# Patient Record
Sex: MALE | Race: WHITE | NOT HISPANIC OR LATINO | Employment: OTHER | URBAN - METROPOLITAN AREA
[De-identification: names, ages, dates, MRNs, and addresses within clinical notes are randomized per-mention and may not be internally consistent; named-entity substitution may affect disease eponyms.]

---

## 2019-07-02 ENCOUNTER — HOSPITAL ENCOUNTER (EMERGENCY)
Facility: CLINIC | Age: 79
Discharge: HOME | End: 2019-07-02
Attending: EMERGENCY MEDICINE | Admitting: EMERGENCY MEDICINE
Payer: COMMERCIAL

## 2019-07-02 ENCOUNTER — APPOINTMENT (OUTPATIENT)
Dept: ULTRASOUND IMAGING | Facility: CLINIC | Age: 79
End: 2019-07-02
Attending: EMERGENCY MEDICINE
Payer: COMMERCIAL

## 2019-07-02 VITALS
DIASTOLIC BLOOD PRESSURE: 116 MMHG | OXYGEN SATURATION: 93 % | TEMPERATURE: 98.1 F | SYSTOLIC BLOOD PRESSURE: 156 MMHG | WEIGHT: 223.11 LBS | RESPIRATION RATE: 16 BRPM | HEART RATE: 91 BPM

## 2019-07-02 DIAGNOSIS — L03.115 CELLULITIS OF RIGHT LOWER EXTREMITY: ICD-10-CM

## 2019-07-02 LAB
ANION GAP SERPL CALCULATED.3IONS-SCNC: 5 MMOL/L (ref 3–14)
BASOPHILS # BLD AUTO: 0 10E9/L (ref 0–0.2)
BASOPHILS NFR BLD AUTO: 0 %
BUN SERPL-MCNC: 21 MG/DL (ref 7–30)
CALCIUM SERPL-MCNC: 9 MG/DL (ref 8.5–10.1)
CHLORIDE SERPL-SCNC: 109 MMOL/L (ref 94–109)
CO2 SERPL-SCNC: 27 MMOL/L (ref 20–32)
CREAT SERPL-MCNC: 1.02 MG/DL (ref 0.66–1.25)
DIFFERENTIAL METHOD BLD: ABNORMAL
EOSINOPHIL # BLD AUTO: 0.4 10E9/L (ref 0–0.7)
EOSINOPHIL NFR BLD AUTO: 3 %
ERYTHROCYTE [DISTWIDTH] IN BLOOD BY AUTOMATED COUNT: 13.7 % (ref 10–15)
GFR SERPL CREATININE-BSD FRML MDRD: 70 ML/MIN/{1.73_M2}
GLUCOSE SERPL-MCNC: 83 MG/DL (ref 70–99)
HCT VFR BLD AUTO: 49.3 % (ref 40–53)
HGB BLD-MCNC: 15.9 G/DL (ref 13.3–17.7)
LYMPHOCYTES # BLD AUTO: 8.4 10E9/L (ref 0.8–5.3)
LYMPHOCYTES NFR BLD AUTO: 57 %
MCH RBC QN AUTO: 32.5 PG (ref 26.5–33)
MCHC RBC AUTO-ENTMCNC: 32.3 G/DL (ref 31.5–36.5)
MCV RBC AUTO: 101 FL (ref 78–100)
MONOCYTES # BLD AUTO: 0.7 10E9/L (ref 0–1.3)
MONOCYTES NFR BLD AUTO: 5 %
NEUTROPHILS # BLD AUTO: 5.1 10E9/L (ref 1.6–8.3)
NEUTROPHILS NFR BLD AUTO: 35 %
PLATELET # BLD AUTO: 183 10E9/L (ref 150–450)
PLATELET # BLD EST: ABNORMAL 10*3/UL
POTASSIUM SERPL-SCNC: 4.1 MMOL/L (ref 3.4–5.3)
RBC # BLD AUTO: 4.89 10E12/L (ref 4.4–5.9)
RBC MORPH BLD: ABNORMAL
SODIUM SERPL-SCNC: 141 MMOL/L (ref 133–144)
VARIANT LYMPHS BLD QL SMEAR: PRESENT
WBC # BLD AUTO: 14.7 10E9/L (ref 4–11)

## 2019-07-02 PROCEDURE — 80048 BASIC METABOLIC PNL TOTAL CA: CPT | Performed by: EMERGENCY MEDICINE

## 2019-07-02 PROCEDURE — 99284 EMERGENCY DEPT VISIT MOD MDM: CPT | Mod: 25

## 2019-07-02 PROCEDURE — 85025 COMPLETE CBC W/AUTO DIFF WBC: CPT | Performed by: EMERGENCY MEDICINE

## 2019-07-02 PROCEDURE — 25000132 ZZH RX MED GY IP 250 OP 250 PS 637: Performed by: EMERGENCY MEDICINE

## 2019-07-02 PROCEDURE — 93971 EXTREMITY STUDY: CPT | Mod: RT

## 2019-07-02 RX ORDER — CEPHALEXIN 500 MG/1
500 CAPSULE ORAL ONCE
Status: COMPLETED | OUTPATIENT
Start: 2019-07-02 | End: 2019-07-02

## 2019-07-02 RX ORDER — CEPHALEXIN 500 MG/1
500 CAPSULE ORAL 4 TIMES DAILY
Qty: 28 CAPSULE | Refills: 0 | Status: SHIPPED | OUTPATIENT
Start: 2019-07-02 | End: 2019-07-12

## 2019-07-02 RX ADMIN — CEPHALEXIN 500 MG: 500 CAPSULE ORAL at 13:36

## 2019-07-02 ASSESSMENT — ENCOUNTER SYMPTOMS
CHILLS: 0
WOUND: 1
FEVER: 0
COLOR CHANGE: 1
SHORTNESS OF BREATH: 0

## 2019-07-02 NOTE — ED PROVIDER NOTES
"  History     Chief Complaint:  Leg Swelling      HPI   Jaswant Sawyer is a 79 year old male with a history of hypertension, enlarged prostate, and tobacco use, who presents with right lower leg swelling for the past 2 weeks, with noticed redness of the area today, prompting him to visit the ED. The patient reports visiting Dr. Means, Dermatology, on 5/31/19, for a tender nodule on his right lower leg, which was found to be a 3 cm erythematous abscess with central purulent drainage. He then describes that about 2 weeks ago, he noticed swelling in his right lower leg that has continued to migrate up his leg and noticed redness and weeping around the area today. He endorses occasional difficulty walking on his right leg. He reports recent flying and boat travel to the [facility] region. He notes previous history of \"itchy\" spots on his skin in the past. He denies fever, chills, pain in the leg, previous leg swelling, chest pain, shortness of breath, or recently working outside with brush.     Allergies:  NDKA     Medications:    Bactrim     Past Medical History:    Enlarged prostate  Smokers cough  HTN  Bladder caliculi     Past Surgical History:    Appendectomy   Cataract removal     Family History:    No family history on file.    Social History:  Smoking status: yes  Alcohol use: yes  Drug use: not on file   PCP: Martínez Mathur     Review of Systems   Constitutional: Negative for chills and fever.   Respiratory: Negative for shortness of breath.    Cardiovascular: Positive for leg swelling. Negative for chest pain.   Skin: Positive for color change and wound.   All other systems reviewed and are negative.      Physical Exam     Patient Vitals for the past 24 hrs:   BP Temp Temp src Pulse Heart Rate Resp SpO2 Weight   07/02/19 1136 (!) 156/116 98.1  F (36.7  C) Temporal 91 91 16 93 % 101.2 kg (223 lb 1.7 oz)       Physical Exam  General: Patient is alert and interactive when I enter the room  Head:  The scalp, face, " and head appear normal  Eyes:  The pupils are equal, round, and reactive to light    Conjunctivae and sclerae are normal  ENT:    External acoustic canals are normal    The oropharynx is normal without erythema.     Uvula is in the midline  Neck:  Normal range of motion  CV:  Regular rate. S1/S2. No murmurs. Normal pulses.   Resp:  Lungs are clear without wheezes or rales. No distress  GI:  Abdomen is soft, no rigidity, guarding, or rebound    No distension. No tenderness to palpation in any quadrant.     MS:  Normal tone. Joints grossly normal without effusions.     No calf or thigh tenderness.  Right leg swelling and redness. Redness extends to the toes up through the mid tibia.     Normal motor assessment of all extremities.  Skin:  No rash or lesions noted. Normal capillary refill on right lower extremity. No abscess identified. Right leg warm to touch.   Neuro: Speech is normal and fluent. Face is symmetric.     Moving all extremities well.   Psych:  Awake. Alert.  Normal affect.  Appropriate interactions.  Lymph: No anterior cervical lymphadenopathy noted    Emergency Department Course     Imaging:  Radiographic findings were communicated with the patient who voiced understanding of the findings.    US Lower Extremity Venous Duplex Right  No evidence of lower extremity deep venous thrombosis.  As read by Radiology.    Laboratory:  BMP: WNL (Creatinine: 1.02)  CBC: WBC 14.7 (H) o/w WNL (HGB 15.9, )    Interventions:  1336: Keflex 500 mg PO    Emergency Department Course:  1217: Nursing notes and vitals reviewed. I performed an exam of the patient as documented above.     IV inserted. Medicine administered as documented above. Blood drawn. This was sent to the lab for further testing, results above.    The patient was sent for a lower extremity ultrasound while in the emergency department, findings above.     1330: I rechecked the patient and discussed the results of his workup thus far.     Findings and  plan explained to the Patient. Patient discharged home with instructions regarding supportive care, medications, and reasons to return. The importance of close follow-up was reviewed. The patient was prescribed Keflex.     I personally reviewed the laboratory results with the Patient and answered all related questions prior to discharge.     Impression & Plan      Medical Decision Making:  Jaswant Sawyer is a 79 year old male who presents for evaluation of skin redness.  The history, physical exam and supporting data are consistent with cellulitis.  There do not appear at this time to be any complication of cellulitis including necrotizing fascitis, lymphangitis, lymphadenitis, abscess, osteomyelitis, sepsis, or shock.  The patient is not immunosuppressed.  Supportive outpatient management is indicated with antibiotics.  Close follow-up of primary care physician to ensure no progression and rapid resolution.  Area of cellulitis was outlined.  Cellulitis precautions for home.    Would start with keflex as no abscess, pustule nor hx of MRSA. Recheck by doctor in 1-2 days.      Critical Care time:  none    Diagnosis:    ICD-10-CM    1. Cellulitis of right lower extremity L03.115 CBC with platelets differential       Disposition:  discharged to home    Discharge Medications:     Medication List      Started    cephALEXin 500 MG capsule  Commonly known as:  KEFLEX  500 mg, Oral, 4 TIMES DAILY          IBianka, am serving as a scribe at 12:17 PM on 7/2/2019 to document services personally performed by Kyrie Ness MD based on my observations and the provider's statements to me.       Bianka Riojas  7/2/2019   [facility] EMERGENCY DEPARTMENT       Kyrie Ness MD  07/02/19 7401

## 2019-07-02 NOTE — ED TRIAGE NOTES
"Patient reports right lower leg swelling, redness and itching for about 2 weeks, getting worse and \"creeping up the leg\". Denies fevers and chills.   "

## 2019-07-02 NOTE — ED AVS SNAPSHOT
[facility]  [address]  [facility] [address]  Phone:  [phone]  Fax:  [phone]                                    Jaswant KEAGAN Sawyer   MRN: [id number]    Department:  [facility]   Date of Visit:  7/2/2019           After Visit Summary Signature Page    I have received my discharge instructions, and my questions have been answered. I have discussed any challenges I see with this plan with the nurse or doctor.    ..........................................................................................................................................  Patient/Patient Representative Signature      ..........................................................................................................................................  Patient Representative Print Name and Relationship to Patient    ..................................................               ................................................  Date                                   Time    ..........................................................................................................................................  Reviewed by Signature/Title    ...................................................              ..............................................  Date                                               Time          22EPIC Rev 08/18

## 2019-07-12 ENCOUNTER — HOSPITAL ENCOUNTER (OUTPATIENT)
Facility: CLINIC | Age: 79
Setting detail: OBSERVATION
Discharge: HOME | End: 2019-07-14
Attending: EMERGENCY MEDICINE | Admitting: INTERNAL MEDICINE
Payer: COMMERCIAL

## 2019-07-12 DIAGNOSIS — I10 ESSENTIAL HYPERTENSION: Primary | ICD-10-CM

## 2019-07-12 DIAGNOSIS — J44.9 CHRONIC OBSTRUCTIVE PULMONARY DISEASE, UNSPECIFIED COPD TYPE (H): ICD-10-CM

## 2019-07-12 DIAGNOSIS — L03.90 CELLULITIS, UNSPECIFIED CELLULITIS SITE: ICD-10-CM

## 2019-07-12 LAB
ANION GAP SERPL CALCULATED.3IONS-SCNC: 7 MMOL/L (ref 3–14)
BUN SERPL-MCNC: 19 MG/DL (ref 7–30)
CALCIUM SERPL-MCNC: 8.7 MG/DL (ref 8.5–10.1)
CHLORIDE SERPL-SCNC: 107 MMOL/L (ref 94–109)
CO2 SERPL-SCNC: 26 MMOL/L (ref 20–32)
CREAT SERPL-MCNC: 0.95 MG/DL (ref 0.66–1.25)
CRP SERPL-MCNC: <2.9 MG/L (ref 0–8)
ERYTHROCYTE [SEDIMENTATION RATE] IN BLOOD BY WESTERGREN METHOD: 5 MM/H (ref 0–20)
GFR SERPL CREATININE-BSD FRML MDRD: 77 ML/MIN/{1.73_M2}
GLUCOSE SERPL-MCNC: 140 MG/DL (ref 70–99)
POTASSIUM SERPL-SCNC: 4 MMOL/L (ref 3.4–5.3)
PROCALCITONIN SERPL-MCNC: <0.05 NG/ML
SODIUM SERPL-SCNC: 140 MMOL/L (ref 133–144)

## 2019-07-12 PROCEDURE — 85025 COMPLETE CBC W/AUTO DIFF WBC: CPT | Performed by: EMERGENCY MEDICINE

## 2019-07-12 PROCEDURE — 84145 PROCALCITONIN (PCT): CPT | Performed by: EMERGENCY MEDICINE

## 2019-07-12 PROCEDURE — 80048 BASIC METABOLIC PNL TOTAL CA: CPT | Performed by: EMERGENCY MEDICINE

## 2019-07-12 PROCEDURE — 85652 RBC SED RATE AUTOMATED: CPT | Performed by: EMERGENCY MEDICINE

## 2019-07-12 PROCEDURE — G0378 HOSPITAL OBSERVATION PER HR: HCPCS

## 2019-07-12 PROCEDURE — 87040 BLOOD CULTURE FOR BACTERIA: CPT | Performed by: EMERGENCY MEDICINE

## 2019-07-12 PROCEDURE — 99285 EMERGENCY DEPT VISIT HI MDM: CPT | Mod: 25

## 2019-07-12 PROCEDURE — 86140 C-REACTIVE PROTEIN: CPT | Performed by: EMERGENCY MEDICINE

## 2019-07-12 PROCEDURE — 96365 THER/PROPH/DIAG IV INF INIT: CPT

## 2019-07-12 PROCEDURE — 99220 ZZC INITIAL OBSERVATION CARE,LEVL III: CPT | Performed by: INTERNAL MEDICINE

## 2019-07-12 PROCEDURE — 36415 COLL VENOUS BLD VENIPUNCTURE: CPT | Performed by: EMERGENCY MEDICINE

## 2019-07-12 PROCEDURE — 25000125 ZZHC RX 250: Performed by: EMERGENCY MEDICINE

## 2019-07-12 PROCEDURE — 25000132 ZZH RX MED GY IP 250 OP 250 PS 637: Performed by: INTERNAL MEDICINE

## 2019-07-12 RX ORDER — LANOLIN ALCOHOL/MO/W.PET/CERES
3 CREAM (GRAM) TOPICAL
Status: DISCONTINUED | OUTPATIENT
Start: 2019-07-12 | End: 2019-07-14 | Stop reason: HOSPADM

## 2019-07-12 RX ORDER — ACETAMINOPHEN 650 MG/1
650 SUPPOSITORY RECTAL EVERY 4 HOURS PRN
Status: DISCONTINUED | OUTPATIENT
Start: 2019-07-12 | End: 2019-07-14 | Stop reason: HOSPADM

## 2019-07-12 RX ORDER — DOXYCYCLINE 100 MG/1
100 CAPSULE ORAL 2 TIMES DAILY
Status: ON HOLD | COMMUNITY
Start: 2019-07-09 | End: 2019-07-14

## 2019-07-12 RX ORDER — NALOXONE HYDROCHLORIDE 0.4 MG/ML
.1-.4 INJECTION, SOLUTION INTRAMUSCULAR; INTRAVENOUS; SUBCUTANEOUS
Status: DISCONTINUED | OUTPATIENT
Start: 2019-07-12 | End: 2019-07-14 | Stop reason: HOSPADM

## 2019-07-12 RX ORDER — CLINDAMYCIN PHOSPHATE 900 MG/50ML
900 INJECTION, SOLUTION INTRAVENOUS ONCE
Status: COMPLETED | OUTPATIENT
Start: 2019-07-12 | End: 2019-07-12

## 2019-07-12 RX ORDER — BISACODYL 5 MG
10 TABLET, DELAYED RELEASE (ENTERIC COATED) ORAL DAILY PRN
Status: DISCONTINUED | OUTPATIENT
Start: 2019-07-12 | End: 2019-07-14 | Stop reason: HOSPADM

## 2019-07-12 RX ORDER — AMOXICILLIN 250 MG
1 CAPSULE ORAL 2 TIMES DAILY
Status: DISCONTINUED | OUTPATIENT
Start: 2019-07-12 | End: 2019-07-14 | Stop reason: HOSPADM

## 2019-07-12 RX ORDER — BISACODYL 10 MG
10 SUPPOSITORY, RECTAL RECTAL DAILY PRN
Status: DISCONTINUED | OUTPATIENT
Start: 2019-07-12 | End: 2019-07-14 | Stop reason: HOSPADM

## 2019-07-12 RX ORDER — BISACODYL 5 MG
5 TABLET, DELAYED RELEASE (ENTERIC COATED) ORAL DAILY PRN
Status: DISCONTINUED | OUTPATIENT
Start: 2019-07-12 | End: 2019-07-14 | Stop reason: HOSPADM

## 2019-07-12 RX ORDER — ONDANSETRON 4 MG/1
4 TABLET, ORALLY DISINTEGRATING ORAL EVERY 6 HOURS PRN
Status: DISCONTINUED | OUTPATIENT
Start: 2019-07-12 | End: 2019-07-14 | Stop reason: HOSPADM

## 2019-07-12 RX ORDER — CLINDAMYCIN PHOSPHATE 900 MG/50ML
900 INJECTION, SOLUTION INTRAVENOUS EVERY 8 HOURS
Status: DISCONTINUED | OUTPATIENT
Start: 2019-07-13 | End: 2019-07-13

## 2019-07-12 RX ORDER — BISACODYL 5 MG
15 TABLET, DELAYED RELEASE (ENTERIC COATED) ORAL DAILY PRN
Status: DISCONTINUED | OUTPATIENT
Start: 2019-07-12 | End: 2019-07-14 | Stop reason: HOSPADM

## 2019-07-12 RX ORDER — ALBUTEROL SULFATE 90 UG/1
2 AEROSOL, METERED RESPIRATORY (INHALATION) EVERY 6 HOURS PRN
Status: DISCONTINUED | OUTPATIENT
Start: 2019-07-12 | End: 2019-07-14 | Stop reason: HOSPADM

## 2019-07-12 RX ORDER — HYDROCHLOROTHIAZIDE 12.5 MG/1
25 CAPSULE ORAL DAILY
Status: DISCONTINUED | OUTPATIENT
Start: 2019-07-12 | End: 2019-07-14 | Stop reason: HOSPADM

## 2019-07-12 RX ORDER — ONDANSETRON 2 MG/ML
4 INJECTION INTRAMUSCULAR; INTRAVENOUS EVERY 6 HOURS PRN
Status: DISCONTINUED | OUTPATIENT
Start: 2019-07-12 | End: 2019-07-14 | Stop reason: HOSPADM

## 2019-07-12 RX ORDER — AMOXICILLIN 250 MG
2 CAPSULE ORAL 2 TIMES DAILY
Status: DISCONTINUED | OUTPATIENT
Start: 2019-07-12 | End: 2019-07-14 | Stop reason: HOSPADM

## 2019-07-12 RX ORDER — ACETAMINOPHEN 325 MG/1
650 TABLET ORAL EVERY 4 HOURS PRN
Status: DISCONTINUED | OUTPATIENT
Start: 2019-07-12 | End: 2019-07-14 | Stop reason: HOSPADM

## 2019-07-12 RX ORDER — NICOTINE 21 MG/24HR
1 PATCH, TRANSDERMAL 24 HOURS TRANSDERMAL DAILY
Status: DISCONTINUED | OUTPATIENT
Start: 2019-07-12 | End: 2019-07-14 | Stop reason: HOSPADM

## 2019-07-12 RX ADMIN — CLINDAMYCIN PHOSPHATE 900 MG: 900 INJECTION, SOLUTION INTRAVENOUS at 17:50

## 2019-07-12 RX ADMIN — SENNOSIDES AND DOCUSATE SODIUM 1 TABLET: 8.6; 5 TABLET ORAL at 19:45

## 2019-07-12 RX ADMIN — HYDROCHLOROTHIAZIDE 25 MG: 12.5 CAPSULE ORAL at 19:44

## 2019-07-12 RX ADMIN — NICOTINE 1 PATCH: 21 PATCH, EXTENDED RELEASE TRANSDERMAL at 19:45

## 2019-07-12 ASSESSMENT — ENCOUNTER SYMPTOMS
SHORTNESS OF BREATH: 0
FEVER: 0
NAUSEA: 0
VOMITING: 0
MYALGIAS: 1
CHILLS: 0

## 2019-07-12 ASSESSMENT — MIFFLIN-ST. JEOR
SCORE: 1799.75
SCORE: 1782.22

## 2019-07-12 NOTE — PHARMACY-ADMISSION MEDICATION HISTORY
Admission medication history interview status for this patient is complete. See [facility] admission navigator for allergy information, prior to admission medications and immunization status.     Medication history interview source(s):Patient  Medication history resources (including written lists, pill bottles, clinic record):care-everywhere    Changes made to PTA medication list:  Added: doxycycline  Deleted: none  Changed: none    Actions taken by pharmacist (provider contacted, etc):None     Additional medication history information:None    Medication reconciliation/reorder completed by provider prior to medication history? No    For patients on insulin therapy: no (Yes/No)   Lantus/levemir/NPH/Mix 70/30 dose: ___ in AM/PM or twice daily   Sliding scale Novolog Y/N   If Yes, do you have a baseline novolog pre-meal dose: ______units with meals   Patients eat three meals a day: Y/N ---  How many episodes of hypoglycemia (low blood glucose) do you have weekly: ---   How many missed doses do you have a week: ---  How many times do you check your blood glucose per day: ---  Any Barriers to therapy: cost of medications/comfortable with giving injections (if applicable)/ comfortable and confident with current diabetes regimen ---      Prior to Admission medications    Medication Sig Last Dose Taking? Auth Provider   doxycycline monohydrate (MONODOX) 100 MG capsule Take 100 mg by mouth 2 times daily For 10 days, started on 7-9-19 7/12/2019 at am Yes Reported, Patient

## 2019-07-12 NOTE — ED PROVIDER NOTES
History     Chief Complaint:  Leg Pain    HPI   Jaswant Sawyer is a 79 year old male, with a history of MRSA, who presents to the ED for evaluation of right lower leg pain. On review of patient s record, he was seen by dermatology, Dr. Means, on 5/31 for an I&D of erythematous abscess behind his right knee. He was then seen at [facility] ED on 7/2 for the initial leg pain/swelling. He was administered a dose of 500mg Keflex PO in the ED and prescribed Keflex 500mg. Upon evaluation, the patient reports he visited his PCP (on 7/8) after the ED visit and was switched to another antibiotic (Doxycycline 100mg). He has been taking the new antibiotic for the past 4-5 days. His symptoms have mildly improved. He was seen by his PCP again today and advised to visit the ED. The patient denies any fever, chills, shortness of breath, chest pain, nausea, or vomiting.       US Lower Extremity Venous Duplex Right, 7/2/2019  No evidence of lower extremity deep venous thrombosis. As read by Radiology.     Laboratory, 7/2/2019  BMP: o/w WNL (Creatinine 1.02)  CBC: WBC 14.7(H), o/w WNL (HGB 15.9, )    Laboratory, 7/8/2019  CBC: WBC 15.0(H), o/w WNL (HGB 16.9, )    Allergies:  No known drug allergies    Medications:    Doxycycline    Past Medical History:    Bladder stones  Arthritis  Asthma   Enlarged prostate  HTN  MRSA    Past Surgical History:    Appendectomy   Cataract removal    Family History:    History reviewed. No pertinent family history.     Social History:  Smoking status: Current every day smoker, 1.00ppd  Alcohol use: Yes  Presents to ED alone    Marital Status:  Single [1]     Review of Systems   Constitutional: Negative for chills and fever.   Respiratory: Negative for shortness of breath.    Cardiovascular: Positive for leg swelling. Negative for chest pain.   Gastrointestinal: Negative for nausea and vomiting.   Musculoskeletal: Positive for myalgias.   All other systems reviewed and are  "negative.    Physical Exam     Patient Vitals for the past 24 hrs:   BP Temp Temp src Pulse Heart Rate Resp SpO2 Height Weight   07/12/19 2249 -- -- -- -- -- 18 -- -- --   07/12/19 1943 152/88 97.6  F (36.4  C) Oral -- 74 18 93 % -- --   07/12/19 1922 -- -- -- -- -- -- -- -- 99.2 kg (218 lb 12.8 oz)   07/12/19 1846 (!) 170/104 96.4  F (35.8  C) Oral -- 79 18 96 % -- --   07/12/19 1830 -- -- -- -- -- -- 93 % -- --   07/12/19 1815 (!) 153/96 -- -- -- -- -- 96 % -- --   07/12/19 1800 (!) 171/108 -- -- 83 -- -- 100 % -- --   07/12/19 1745 (!) 160/94 -- -- -- -- -- -- -- --   07/12/19 1555 (!) 187/106 98.8  F (37.1  C) Temporal -- 92 18 96 % 1.88 m (6' 2\") 101 kg (222 lb 10.6 oz)     Physical Exam  General: Patient is alert and interactive when I enter the room  Head:  The scalp, face, and head appear normal  Eyes:  Conjunctivae are normal  ENT:    The nose is normal    Pinnae are normal    External acoustic canals are normal  Neck:  Trachea midline  CV:  Pulses are normal    Resp:  No respiratory distress   Abdomen:      Soft, non-tender, non-distended  Musc:  Normal muscular tone    No major joint effusions    Right-sided erythema of ankle extending up to knee, 1+ nonpitting edema of right lower leg, induration extending to popliteal fossa, no fluctuance, scar from previous drainage behind right knee  Skin:  No rash or lesions noted  Neuro:  Speech is normal and fluent. Face is symmetric.     Moving all extremities well.   Psych: Awake. Alert.  Normal affect.  Appropriate interactions.    Emergency Department Course     Laboratory:  CRP inflammation: <2.9  Erythrocyte sed rate auto: 5  Procalcitonin: <0.05    CBC: WBC 16.7(H), o/w WNL (HGB 16.0, )  BMP: Glucose 140(H), o/w WNL (Creatinine 0.95)     Blood cultures x2: In process     Interventions:  1750: Clindamycin 900mg IV    Emergency Department Course:  Past medical records, nursing notes, and vitals reviewed.  1642: I performed an exam of the patient and " obtained history, as documented above.    IV inserted and blood drawn.    1714: I rechecked the patient. Explained findings to patient.    1740: I spoke to Dr. Higgins of the hospitalist service who accepts the patient for admission.     1823: I spoke with Dr. Higgins after her evaluation of the patient in the ED.    Findings and plan explained to the Patient who consents to admission. Discussed the patient with Dr. Higgins, who will admit the patient to a med bed for further monitoring, evaluation, and treatment.     Impression & Plan      Medical Decision Making:  Jaswant Sawyer is a 79 year old male with history of MRSA who presents with left lower leg pain. He reports this has been going on for quite some time but was recently started on antibiotics and then started on a different antibiotic yesterday. He does have history of MRSA, so previously, he was not on appropriate antibiotics for MRSA. He certainly has pretty significant erythema and swelling of his right leg. He does have leukocytosis of 16, but his CRP and ESR are negative. He's afebrile. He already had an ultrasound of his leg a few weeks ago that revealed no DVT. I did do a beside ultrasound. There was certainly no abscess. He has significant amount of swelling and cobblestoning in his leg. I think at this point, we should start him on IV Clindamycin and have him be admitted to the hospital. It's unclear if this is infectious or another process. Dr. Higgins evaluated the patient and with his lymphocytosis, it's certainly possible there's another disease process such as malignancy going on. Therefore, patient will be admitted to obs.     Diagnosis:    ICD-10-CM   1. Cellulitis, unspecified cellulitis site L03.90     Disposition: Patient admitted to a med bed by Dr. Gisela Carson  7/12/2019   [facility] EMERGENCY DEPARTMENT    Scribe Disclosure:  I, Jewell Carson, am serving as a scribe at 4:42 PM on 7/12/2019 to document services personally  performed by Rebeca Fermin MD based on my observations and the provider's statements to me.        Rebeca Fermin MD  07/13/19 0055

## 2019-07-12 NOTE — ED NOTES
"[facility]  ED Nurse Handoff Report    Jaswant KEAGAN Sawyer is a 79 year old male   ED Chief complaint: Leg Pain  . ED Diagnosis:   Final diagnoses:   Cellulitis, unspecified cellulitis site     Allergies: No Known Allergies    Code Status: Full Code  Activity level - Baseline/Home:  Independent. Activity Level - Current:   Independent. Lift room needed: No. Bariatric: No   Needed: No   Isolation: No. Infection: Not Applicable.     Vital Signs:   Vitals:    07/12/19 1555 07/12/19 1800   BP: (!) 187/106 (!) 171/108   Pulse:  83   Resp: 18    Temp: 98.8  F (37.1  C)    TempSrc: Temporal    SpO2: 96% 100%   Weight: 101 kg (222 lb 10.6 oz)    Height: 1.88 m (6' 2\")        Cardiac Rhythm:  ,      Pain level: 0-10 Pain Scale: 1  Patient confused: No. Patient Falls Risk: Yes.   Elimination Status: Has voided   Patient Report - Initial Complaint:   Pt presents with R leg swelling and pain since May. Was here last week, prescribed Keflex, saw his MD who prescribed different abx. Seen MD today and was told to come here for worsening cellulitis. ABCs intact.         . Focused Assessment: erythema, warm to touch. Cms intact  Tests Performed: labs. Abnormal Results:   Labs Ordered and Resulted from Time of ED Arrival Up to the Time of Departure from the ED   BASIC METABOLIC PANEL - Abnormal; Notable for the following components:       Result Value    Glucose 140 (*)     All other components within normal limits   CBC WITH PLATELETS DIFFERENTIAL - Abnormal; Notable for the following components:    WBC 16.7 (*)     Absolute Lymphocytes 9.5 (*)     All other components within normal limits   CRP INFLAMMATION   BLOOD CULTURE   BLOOD CULTURE     .   Treatments provided: IV antibiotics  Family Comments: n/a  OBS brochure/video discussed/provided to patient:  N/A  ED Medications:   Medications   clindamycin (CLEOCIN) infusion 900 mg (900 mg Intravenous New Bag 7/12/19 1750)     Drips infusing:  Yes  For the majority of " the shift, the patient's behavior Green. Interventions performed were n/a.     Severe Sepsis OR Septic Shock Diagnosis Present: No      ED Nurse Name/Phone Number: Monroe Martin,   6:03 PM    RECEIVING UNIT ED HANDOFF REVIEW    Above ED Nurse Handoff Report was reviewed: Yes  Reviewed by: Sally Sutton on July 12, 2019 at 6:16 PM

## 2019-07-12 NOTE — ED TRIAGE NOTES
Pt presents with R leg swelling and pain since May. Was here last week, prescribed Keflex, saw his MD who prescribed different abx. Seen MD today and was told to come here for worsening cellulitis. ABCs intact.

## 2019-07-12 NOTE — PLAN OF CARE
Patient arrived on floor at 1845 from ED. Alert and oriented. HTN. Ambulated to bathroom with SBA. Voiding in adequate amounts. Denies pain. Oriented to room and call system, will await further MD orders.

## 2019-07-13 LAB
ERYTHROCYTE [DISTWIDTH] IN BLOOD BY AUTOMATED COUNT: 13.6 % (ref 10–15)
HCT VFR BLD AUTO: 47 % (ref 40–53)
HGB BLD-MCNC: 15.4 G/DL (ref 13.3–17.7)
MCH RBC QN AUTO: 32.6 PG (ref 26.5–33)
MCHC RBC AUTO-ENTMCNC: 32.8 G/DL (ref 31.5–36.5)
MCV RBC AUTO: 99 FL (ref 78–100)
PLATELET # BLD AUTO: 156 10E9/L (ref 150–450)
RBC # BLD AUTO: 4.73 10E12/L (ref 4.4–5.9)
WBC # BLD AUTO: 15.6 10E9/L (ref 4–11)

## 2019-07-13 PROCEDURE — G0378 HOSPITAL OBSERVATION PER HR: HCPCS

## 2019-07-13 PROCEDURE — 25000125 ZZHC RX 250: Performed by: INTERNAL MEDICINE

## 2019-07-13 PROCEDURE — 25000132 ZZH RX MED GY IP 250 OP 250 PS 637: Performed by: INTERNAL MEDICINE

## 2019-07-13 PROCEDURE — 99207 ZZC CDG-CUT & PASTE-POTENTIAL IMPACT ON LEVEL: CPT | Performed by: INTERNAL MEDICINE

## 2019-07-13 PROCEDURE — 36415 COLL VENOUS BLD VENIPUNCTURE: CPT | Performed by: INTERNAL MEDICINE

## 2019-07-13 PROCEDURE — 85027 COMPLETE CBC AUTOMATED: CPT | Performed by: INTERNAL MEDICINE

## 2019-07-13 PROCEDURE — 99225 ZZC SUBSEQUENT OBSERVATION CARE,LEVEL II: CPT | Performed by: INTERNAL MEDICINE

## 2019-07-13 PROCEDURE — 99207 ZZC MOONLIGHTING INDICATOR: CPT | Performed by: INTERNAL MEDICINE

## 2019-07-13 PROCEDURE — 25000128 H RX IP 250 OP 636: Performed by: INTERNAL MEDICINE

## 2019-07-13 PROCEDURE — 25800030 ZZH RX IP 258 OP 636: Performed by: INTERNAL MEDICINE

## 2019-07-13 PROCEDURE — 96375 TX/PRO/DX INJ NEW DRUG ADDON: CPT

## 2019-07-13 PROCEDURE — 96376 TX/PRO/DX INJ SAME DRUG ADON: CPT

## 2019-07-13 RX ADMIN — HYDROCHLOROTHIAZIDE 25 MG: 12.5 CAPSULE ORAL at 08:06

## 2019-07-13 RX ADMIN — SENNOSIDES AND DOCUSATE SODIUM 1 TABLET: 8.6; 5 TABLET ORAL at 08:06

## 2019-07-13 RX ADMIN — NICOTINE 1 PATCH: 21 PATCH, EXTENDED RELEASE TRANSDERMAL at 08:06

## 2019-07-13 RX ADMIN — SENNOSIDES AND DOCUSATE SODIUM 2 TABLET: 8.6; 5 TABLET ORAL at 19:31

## 2019-07-13 RX ADMIN — VANCOMYCIN HYDROCHLORIDE 1500 MG: 10 INJECTION, POWDER, LYOPHILIZED, FOR SOLUTION INTRAVENOUS at 17:25

## 2019-07-13 RX ADMIN — CLINDAMYCIN PHOSPHATE 900 MG: 900 INJECTION, SOLUTION INTRAVENOUS at 09:18

## 2019-07-13 RX ADMIN — CLINDAMYCIN PHOSPHATE 900 MG: 900 INJECTION, SOLUTION INTRAVENOUS at 01:02

## 2019-07-13 NOTE — PLAN OF CARE
PRIMARY DIAGNOSIS: SOFT TISSUE INFECTIONS  OUTPATIENT/OBSERVATION GOALS TO BE MET BEFORE DISCHARGE:  Vitals sign stable or return to baseline: Yes    Tolerating oral antibiotics or has home infusion set up if applicable:No. On IV antibiotic.     Pain status: Denies pain.    Return to near baseline physical activity: Yes. Independent.     Discharge Planner Nurse   Safe discharge environment identified: No  Barriers to discharge: Yes       Entered by: Angela White 07/13/2019 1:20 AM     Please review provider order for any additional goals.     Nurse to notify provider when observation goals have been met and patient is ready for discharge.

## 2019-07-13 NOTE — PLAN OF CARE
PRIMARY DIAGNOSIS: SOFT TISSUE INFECTIONS  OUTPATIENT/OBSERVATION GOALS TO BE MET BEFORE DISCHARGE:  1. Vitals sign stable or return to baseline: Yes     2. Tolerating oral antibiotics or has home infusion set up if applicable: No. On IV antibiotic.     3. Pain status: Denied pain.     4. Return to near baseline physical activity: Yes     Discharge Planner Nurse   Safe discharge environment identified: No  Barriers to discharge: Yes         A&Ox4, VSS, /90, Denies pain.  Up independently in room.  Tolerating regular diet.  Continues on IV Cleocin as ordered  WBC 15.6.  RLE CMS intact except some numbness, reddened area lightening per patient.  Edema improving, +1-2 RLE.  Will continue to monitor.  Please review provider order for any additional goals.      Nurse to notify provider when observation goals have been met and patient is ready for discharge.

## 2019-07-13 NOTE — PLAN OF CARE
PRIMARY DIAGNOSIS: SOFT TISSUE INFECTIONS  OUTPATIENT/OBSERVATION GOALS TO BE MET BEFORE DISCHARGE:  Vitals sign stable or return to baseline: Yes    Tolerating oral antibiotics or has home infusion set up if applicable: No. On IV antibiotic.    Pain status: Denied pain.    Return to near baseline physical activity: Yes    Discharge Planner Nurse   Safe discharge environment identified: No  Barriers to discharge: Yes       Entered by: Angela White 07/13/2019 4:54 AM     Please review provider order for any additional goals.     Nurse to notify provider when observation goals have been met and patient is ready for discharge.

## 2019-07-13 NOTE — H&P
History and Physical     Jaswant Sawyer MRN# [id number]   YOB: 1940 Age: 79 year old      Date of Admission:  7/12/2019    Primary care provider: Martínez Mathur          Assessment and Plan:   Summary of Stay: Jaswant Sawyer is a 79 year old male with a history of untreated hypertension admitted on 7/12/2019 with ongoing right lower extremity erythema and swelling refractory to outpatient antibiotic therapy.    Began few days after lancing of a growth behind his right knee that subsequently grew out MRSA on 6/1/2019-sensitivities are available in care everywhere and it looks pansensitive..  This was treated with topical therapy he did not seek treatment until 7/2/2019 at which time ultrasound was completed negative for DVT, and he was placed on cephalexin.  Symptoms failed to improve so he was seen on 7/8/2019 and placed on doxycycline.  Reevaluated 7/12/2019 still no improvement and so sent to the emergency room for further evaluation.  He has no systemic symptoms.    Problem List:   1. Right lower extremity swelling and erythema: Cellulitis versus other.  CRP is undetectable, ultrasound negative for DVT on 7/2/2019, interestingly has a lymphocytosis not a neutrophilia.  His wound swab from 6/1/2019 did show MRSA-pansensitive, copy to the bottom of this note.  Failed cephalexin, and then doxycycline therapy.  He was given clindamycin in the emergency room which I will continue.  I have asked for infectious disease consultation to help guide therapy specifically duration.  2. Hypertension: Untreated, given degree of hypertension he is high risk for stroke, MI, kidney injury.  He is reluctantly agreed to let me try him on some medication.  Is moderately wheezy on exam so avoid a beta-blocker for now, his heavy smoking history would make an ACE inhibitor potentially dangerous and increases risk of lung cancer, therefore will treat with hydrochlorothiazide starting at 25 mg a day.  Will require a BMP and  recheck of BP in 2 weeks  3. COPD by exam- have ordered an albuterol inhaler, his chronic cough is likely related to increase in the hospital while he is not smoking.  4. Tobacco use: Long-standing, high risk for complications including cardiovascular disease, diffuse types of cancers.  Discussed with patient he understands the risk of smoking and would really like to quit.  He is interested in nicotine patch which I have ordered  DVT Prophylaxis: Enoxaparin (Lovenox) SQ  Code Status: Full Code  Disposition: There is no evidence of sepsis, patient would like to leave the hospital as soon as possible.  We will give IV clindamycin for coverage for 24 hours, ask for ID input.  Will admit as observation as expected he will have a less than 2 midnight stay              Chief Complaint:   Ongoing right lower extremity erythema and edema       History of Present Illness:   Mr. Sawyer is a 79-year-old gentleman who has a history of untreated hypertension who comes in with 5 weeks of ongoing lower extremity erythema and swelling    He had lancing of a growth behind his right knee that subsequently grew out MRSA on 6/1/2019-sensitivities are available in care everywhere and it looks pansensitive.  This was treated with topical therapy-it looks like this was treated with topical therapy.     A few days later he noticed some erythema of his foot that he had a trip planned to [place] that was nonrefundable.  This was a fishing trip.  He noticed that his leg was starting to swell and become red and quite painful to the point he could not really put any weight on it.  He went on his fishing vacation anyway and returned around 31 June.  Called his doctor's office and spoke to a nurse who recommended that he come to the emergency room for evaluation.    Came into our ER on 7/2/2019 at which time ultrasound was completed negative for DVT, and he was placed on cephalexin for right lower extremity cellulitis.  Symptoms failed to improve  so he was seen on 7/8/2019 and placed on doxycycline.  Reevaluated 7/12/2019 still no improvement and so sent to the emergency room for further evaluation.    He notes that his symptoms are actually better than when it began, there is some improvement in swelling and erythema and pain.  He has had no systemic symptoms specifically no nausea vomiting fatigue malaise.    In the ER his vital signs are notable for hypertension with blood pressures in the 150s to 170s over 90s to 100s, he is afebrile at 98.8.  BMP is within normal limits, glucose is elevated at 140 on a nonfasting specimen, CBC shows an elevated white count of 16.7 with a lymphocytosis.  And CRP is undetectable.  He has been placed on IV clindamycin and I am asked to admit him for further cares      The history is obtained in discussion with the ER provider Dr. Rebeca Fermin, and the patient with excellent reliability        Past Medical History:     Past Medical History:   Diagnosis Date     Essential hypertension     untreated     Tobacco abuse              Past Surgical History:     Past Surgical History:   Procedure Laterality Date     APPENDECTOMY       BLADDER SURGERY      bladder stone removal      TURP               Social History:     Social History     Tobacco Use     Smoking status: Current Every Day Smoker     Packs/day: 1.00   Substance Use Topics     Alcohol use: Not Currently     Lives alone, independent in all activities.  CODE STATUS full       Family History:   Family history reviewed and not relevant to this hospitalization         Allergies:   No Known Allergies          Medications:     Prior to Admission medications    Medication Sig Last Dose Taking? Auth Provider   doxycycline monohydrate (MONODOX) 100 MG capsule Take 100 mg by mouth 2 times daily For 10 days, started on 7-9-19 7/12/2019 at am Yes Reported, Patient                 Review of Systems:   A Comprehensive greater than 10 system review of systems was carried out.   "Pertinent positives and negatives are noted above.  Otherwise negative for contributory information.           Physical Exam:   Blood pressure (!) 170/104, pulse 83, temperature 96.4  F (35.8  C), temperature source Oral, resp. rate 18, height 1.88 m (6' 2\"), weight 101 kg (222 lb 10.6 oz), SpO2 96 %.  Exam:    General:  Pleasant nad looks stated age  HEENT:  Head nc/at sclera clear PERRL O/P:  Moist mucus membranes no posterior pharyngeal erythema or exudate.  Neck is supple  Lungs: Diffuse rhonchorous changes with occasional wheeze, moderate air movement  CV:  RRR no m/r/g trace left le edema  Abd:  S/nt/nd no r/g  Neuro:  Cn 2-12 grossly intact and strength is intact in b ue and le  Alert and oriented affect appropriate   Skin:  W/d no c/c  Right lower extremity: Tensely swollen and only partially pitting edema at about 1+ with xerosis of the skin mild warmth mild tenderness.  He has remnants of that abscess that was drained on the back of that right knee               Data:          Lab Results   Component Value Date     07/12/2019    Lab Results   Component Value Date    CHLORIDE 107 07/12/2019    Lab Results   Component Value Date    BUN 19 07/12/2019      Lab Results   Component Value Date    POTASSIUM 4.0 07/12/2019    Lab Results   Component Value Date    CO2 26 07/12/2019    Lab Results   Component Value Date    CR 0.95 07/12/2019        Lab Results   Component Value Date    WBC 16.7 (H) 07/12/2019    HGB 16.0 07/12/2019    HCT 49.6 07/12/2019     07/12/2019     07/12/2019   CRP less than 2.9         Imaging:   Right lower extremity ultrasound negative for DVT on 7/2/2019        Aerobic Culture (05/31/2019 10:44 AM CDT)  Aerobic Culture (05/31/2019 10:44 AM CDT)   Component Value Ref Range Performed At Pathologist Signature   Aerobic Culture Growth (A)   [facility]     Aerobic Culture METHICILLIN RESISTANT STAPHYLOCOCCUS AUREUSComment: This is an edited result. Previous organism " was Staphylococcus aureus on 6/1/2019 at 7:09 PM CDT.   [facility]     Gram Smear Few PMN's Present   [facility]     Gram Smear No Organisms Seen   [facility]       Aerobic Culture (05/31/2019 10:44 AM CDT)   Specimen   Swab (Source Required) - Leg, right     Aerobic Culture (05/31/2019 10:44 AM CDT)   Organism Antibiotic Method Susceptibility   Methicillin Resistant Staphylococcus aureus (MRSA) Clindamycin SHOAIB SENSITIVITY 0.25 mcg/mL: SUSCEPTIBLE       Comment: No evidence of inducible clindamycin resistance.   Methicillin Resistant Staphylococcus aureus (MRSA) Erythromycin SHOAIB SENSITIVITY >=8 mcg/mL: Resistant     Methicillin Resistant Staphylococcus aureus (MRSA) Oxacillin SHOAIB SENSITIVITY >=4 mcg/mL: Resistant       Comment: Oxacillin-susceptible Staphylococci can be considered susceptible to Beta-lactam/Beta-lactamase inhibitor combinations, oral cephems, parenteral cephems including cephalosporins I-IV, and carbapenems.   Methicillin Resistant Staphylococcus aureus (MRSA) Trimethoprim/Sulfamethoxazole SHOAIB SENSITIVITY <=10 mcg/mL: SUSCEPTIBLE     Methicillin Resistant Staphylococcus aureus (MRSA) Tetracycline SHOAIB SENSITIVITY <=1 mcg/mL: SUSCEPTIBLE       Comment: Predicts susceptibility of doxycycline and minocycline   Methicillin Resistant Staphylococcus aureus (MRSA) Vancomycin SHOAIB SENSITIVITY 1 mcg/mL: SUSCEPTIBLE       Aerobic Culture (05/31/2019 10:44 AM CDT)   Performing Organization Address City/State/Zipcode Phone Number   [facility]   [address] [place] [address] [phone]

## 2019-07-13 NOTE — PROGRESS NOTES
[facility]    Hospitalist Progress Note    Assessment & Plan   Jaswant KEAGAN Sawyer is a 79 year old male with a history of untreated hypertension admitted on 7/12/2019 with ongoing right lower extremity erythema and swelling refractory to outpatient antibiotic therapy.     Began few days after lancing of a growth behind his right knee that subsequently grew out MRSA on 6/1/2019-sensitivities are available in care everywhere and it looks pansensitive..  This was treated with topical therapy he did not seek treatment until 7/2/2019 at which time ultrasound was completed negative for DVT, and he was placed on cephalexin.  Symptoms failed to improve so he was seen on 7/8/2019 and placed on doxycycline.  Reevaluated 7/12/2019 still no improvement and so sent to the emergency room for further evaluation.  He has no systemic symptoms.     Problem List:   1. Right lower extremity swelling and erythema: Cellulitis versus other.  CRP is undetectable, ultrasound negative for DVT on 7/2/2019, interestingly has a lymphocytosis not a neutrophilia.  His wound swab from 6/1/2019 did show MRSA-pansensitive, copy to the bottom of this note.  Failed cephalexin, and then doxycycline therapy.  He was given clindamycin in the emergency room which I will continue.  I have asked for infectious disease consultation to help guide therapy specifically duration.  2. Hypertension: Untreated, given degree of hypertension he is high risk for stroke, MI, kidney injury.  He is reluctantly agreed to let me try him on some medication.  Is moderately wheezy on exam so avoid a beta-blocker for now, his heavy smoking history would make an ACE inhibitor potentially dangerous and increases risk of lung cancer, therefore will treat with hydrochlorothiazide starting at 25 mg a day.  Will require a BMP and recheck of BP in 2 weeks  3. COPD by exam- have ordered an albuterol inhaler, his chronic cough is likely related to increase in the hospital while  he is not smoking.  4. Tobacco use: Long-standing, high risk for complications including cardiovascular disease, diffuse types of cancers.  Discussed with patient he understands the risk of smoking and would really like to quit.  He is interested in nicotine patch which I have ordered    DVT Prophylaxis: Enoxaparin (Lovenox) SQ  Code Status: Full Code  Disposition: 1 day likely    Ryan Solitario MD   Text Page (7am to 6pm)    Interval History   Improving, ID consult pending.     -Data reviewed today: I reviewed all new labs and imaging results over the last 24 hours. I personally reviewed no images or EKG's today.    Physical Exam   Temp: 96.2  F (35.7  C) Temp src: Oral BP: 136/90 Pulse: 83 Heart Rate: 69 Resp: 18 SpO2: 92 % O2 Device: None (Room air)    Vitals:    07/12/19 1555 07/12/19 1922   Weight: 101 kg (222 lb 10.6 oz) 99.2 kg (218 lb 12.8 oz)     Vital Signs with Ranges  Temp:  [96.1  F (35.6  C)-98.8  F (37.1  C)] 96.2  F (35.7  C)  Pulse:  [83] 83  Heart Rate:  [60-92] 69  Resp:  [16-18] 18  BP: (136-187)/() 136/90  SpO2:  [92 %-100 %] 92 %  No intake/output data recorded.    GEN: NAD, pleasant  HEENT: no icterus  CV: RRR, normal s1/s2, no murmurs/rubs/s3/s4, no heave. JVP normal.  CHEST: CTAB  ABD: soft, NT/ND, NABS  : no flank/suprapubic tenderness  NEURO: AA&Ox3, fluent/appropriate, motor grossly nonfocal  PSYCH: cooperative, affect appropriate    Medications       clindamycin  900 mg Intravenous Q8H     hydrochlorothiazide  25 mg Oral Daily     nicotine  1 patch Transdermal Daily     nicotine   Transdermal Q8H     nicotine   Transdermal Daily     senna-docusate  1 tablet Oral BID    Or     senna-docusate  2 tablet Oral BID       Data   Recent Labs   Lab 07/13/19  0648 07/12/19  1602   WBC 15.6* 16.7*   HGB 15.4 16.0   MCV 99 100    165   NA  --  140   POTASSIUM  --  4.0   CHLORIDE  --  107   CO2  --  26   BUN  --  19   CR  --  0.95   ANIONGAP  --  7   REMI  --  8.7   GLC  --  140*        Imaging:   No results found for this or any previous visit (from the past 24 hour(s)).

## 2019-07-13 NOTE — PLAN OF CARE
PRIMARY DIAGNOSIS: SOFT TISSUE INFECTIONS  OUTPATIENT/OBSERVATION GOALS TO BE MET BEFORE DISCHARGE:  Vitals sign stable or return to baseline: Yes     Tolerating oral antibiotics or has home infusion set up if applicable: No. On IV antibiotic.     Pain status: Denied pain.     Return to near baseline physical activity: Yes     Discharge Planner Nurse   Safe discharge environment identified: No  Barriers to discharge: Yes     A&Ox4, VSS, Denies pain.  Up independently in room.  Tolerating regular diet.  Continues on IV Cleocin as ordered  WBC 15.6.  RLE CMS intact except some numbness, reddened area lightening per patient.  Edema improving, +1-2 RLE.  Will continue to monitor.  Please review provider order for any additional goals.      Nurse to notify provider when observation goals have been met and patient is ready for discharge.

## 2019-07-13 NOTE — PLAN OF CARE
PRIMARY DIAGNOSIS: SOFT TISSUE INFECTIONS (Cellulitis)   OUTPATIENT/OBSERVATION GOALS TO BE MET BEFORE DISCHARGE:  Vitals sign stable or return to baseline: Yes    Tolerating oral antibiotics or has home infusion set up if applicable: No, pt initiated on IV vancomycin    Pain status: Pain free.    Return to near baseline physical activity: Yes    Discharge Planner Nurse   Safe discharge environment identified: Yes  Barriers to discharge: Yes - currently on IV antibiotics       Entered by: Esthela Bledsoe 07/13/2019 6:35 PM     Please review provider order for any additional goals.     Nurse to notify provider when observation goals have been met and patient is ready for discharge.    IV vancomycin initiated at 1725. Pt tolerating well.

## 2019-07-13 NOTE — CONSULTS
Infectious Diseases Consultation  July 13, 2019  Jaswant KEAGAN Sawyer MRN# [id number]   Age: 79 year old YOB: 1940   Date of Admission: 7/12/2019     Reason for consult: I was asked to see this patient for evaluation of R leg cellulitis.           Requesting physician Gisela              Past Medical History:  Past Medical History:   Diagnosis Date     Essential hypertension     untreated     Tobacco abuse        Past Surgical History:  Past Surgical History:   Procedure Laterality Date     APPENDECTOMY       BLADDER SURGERY      bladder stone removal      TURP         History of Present Illness:  79-year-old gentleman who has a history of untreated hypertension who comes in with 5 weeks of ongoing lower extremity erythema and swelling     He had lancing of an abscess behind his right knee that subsequently grew out MRSA on 2 occasions in May 2019. on 6/1/2019;   This was treated with topical therapy.      A few days later he noticed some erythema of his foot that he had a trip planned to [place] that was nonrefundable.  This was a fishing trip.  He noticed that his leg was starting to swell and become red and quite painful to the point he could not really put any weight on it.  He went on his fishing vacation anyway and returned around 31 June.  Called his doctor's office and spoke to a nurse who recommended that he come to the emergency room for evaluation.     Came into our ER on 7/2/2019 at which time ultrasound was completed negative for DVT, and he was placed on cephalexin for right lower extremity cellulitis.  Symptoms failed to improve so he was seen on 7/8/2019 and placed on doxycycline.  Reevaluated 7/12/2019 still no improvement and so sent to the emergency room for further evaluation.     He notes that his symptoms are actually better than when it began, there is some improvement in swelling and erythema and pain.  He has had no systemic symptoms specifically no nausea vomiting fatigue  "malaise.     In the ER his vital signs are notable for hypertension with blood pressures in the 150s to 170s over 90s to 100s, he is afebrile at 98.8.  BMP is within normal limits, glucose is elevated at 140 on a nonfasting specimen, CBC shows an elevated white count of 16.7 with a lymphocytosis.  And CRP is undetectable.    Has had cellulitis since early June.  Was on cephalexin x 4.5 days PTA and not any better.  Now already much better.   Had R popliteal fossa abscess-MRSA ini May 2019 and he thinks it may be related  Has erythema that spread proximal medial R calf into shin and ankle  No fever or chills  No diarrhea    Smokes 1 PPD    Antibiotics:  Clindamycin 900 mg iv q 8    afebrile    Review of Systems: as per HPI    Social History: single, lives in [place] alone, smokes 1 PPD  Social History     Tobacco Use     Smoking status: Current Every Day Smoker     Packs/day: 1.00   Substance Use Topics     Alcohol use: Not Currently        Family History:  No family history on file.     Allergies:  This patient is allergic to has No Known Allergies.     Physical Exam:  Vitals were reviewed  Blood pressure 136/90, pulse 83, temperature 96.2  F (35.7  C), temperature source Oral, resp. rate 18, height 1.88 m (6' 2\"), weight 99.2 kg (218 lb 12.8 oz), SpO2 92 %.  GEN: alert, comfortable, O x 3  HEENT: no stigmata IE  LN: no neck LA  LUNG: CTA  COR: RRR  ABDOMEN: soft, NT, ND  R popliteal fossa-area focal swelling/? Escar, NT, no gross infection  RLE-very mild erythema ankle, shin/calf with 2+ edema, some desquamation R foot/ankle, shin skin slightly tented, indentation mid shin  No rash    Data:  CBC  Recent Labs   Lab 07/13/19  0648 07/12/19  1602   WBC 15.6* 16.7*   HGB 15.4 16.0    165       BMP  Recent Labs   Lab 07/12/19  1602      POTASSIUM 4.0   CHLORIDE 107   REMI 8.7   CO2 26   BUN 19   CR 0.95   *       CULTURES  Recent Labs   Lab 07/12/19  1743 07/12/19  1739   CULT No growth after 9 " hours No growth after 9 hours   BC 7/12 x 2 NG  R knee- 5/1 MRSA  R knee 5/31 MRSA S clinda, R erythromycin, S septra, TCN     Attestation:  I have reviewed today's vital signs, notes, medications, labs and imaging.    ASSESSMENT:  1. CELLULITIS RLE-appearance potentially c/w Strep etiology but more likely related to MRSA abscess R popliteal fossa noted early and late May 2019. Cx with MRSA was R to erythromycin so usually like to avoid clindamycin as resistance to erythromycin suggests at risk inducible resistance to clindamycin; improving in part due to elevation, favor vancomycin then septra or doxycycline; he actually was Rx septra x 7 days on 5/4.  Not improving on cephalexin on admit.    2. ABSCESS R POPLITEAL FOSSA, ? INFECTED BAKERS CYST-MRSA, s/p I and D, suspect source of R lower leg cellulitis    3. SMOKING DEPENDENCE    REC  1. Stop clindamycin  2. Vancomycin until discharge then septra or doxy, favor septra 1 DS bid x 10 days with f/u PMD before stop  3. Elevate RLE, d/w RN  4. Potentially discharge in 1-2 days  Thanks, will f/u tomorrow if here;      JOHN JIANG M.D.  O:[phone]   B:[phone]

## 2019-07-14 VITALS
WEIGHT: 218.8 LBS | TEMPERATURE: 97.3 F | SYSTOLIC BLOOD PRESSURE: 133 MMHG | HEART RATE: 77 BPM | BODY MASS INDEX: 28.08 KG/M2 | OXYGEN SATURATION: 92 % | DIASTOLIC BLOOD PRESSURE: 89 MMHG | RESPIRATION RATE: 20 BRPM | HEIGHT: 74 IN

## 2019-07-14 LAB
ALBUMIN SERPL-MCNC: 3.6 G/DL (ref 3.4–5)
ALP SERPL-CCNC: 106 U/L (ref 40–150)
ALT SERPL W P-5'-P-CCNC: 19 U/L (ref 0–70)
ANION GAP SERPL CALCULATED.3IONS-SCNC: 6 MMOL/L (ref 3–14)
AST SERPL W P-5'-P-CCNC: 16 U/L (ref 0–45)
BILIRUB SERPL-MCNC: 0.9 MG/DL (ref 0.2–1.3)
BUN SERPL-MCNC: 16 MG/DL (ref 7–30)
CALCIUM SERPL-MCNC: 8.8 MG/DL (ref 8.5–10.1)
CHLORIDE SERPL-SCNC: 107 MMOL/L (ref 94–109)
CO2 SERPL-SCNC: 25 MMOL/L (ref 20–32)
CREAT SERPL-MCNC: 0.99 MG/DL (ref 0.66–1.25)
GFR SERPL CREATININE-BSD FRML MDRD: 72 ML/MIN/{1.73_M2}
GLUCOSE SERPL-MCNC: 109 MG/DL (ref 70–99)
POTASSIUM SERPL-SCNC: 4 MMOL/L (ref 3.4–5.3)
PROT SERPL-MCNC: 6.8 G/DL (ref 6.8–8.8)
SODIUM SERPL-SCNC: 138 MMOL/L (ref 133–144)

## 2019-07-14 PROCEDURE — 80053 COMPREHEN METABOLIC PANEL: CPT | Performed by: INTERNAL MEDICINE

## 2019-07-14 PROCEDURE — 99217 ZZC OBSERVATION CARE DISCHARGE: CPT | Performed by: INTERNAL MEDICINE

## 2019-07-14 PROCEDURE — 25800030 ZZH RX IP 258 OP 636: Performed by: INTERNAL MEDICINE

## 2019-07-14 PROCEDURE — 25000132 ZZH RX MED GY IP 250 OP 250 PS 637: Performed by: INTERNAL MEDICINE

## 2019-07-14 PROCEDURE — 96376 TX/PRO/DX INJ SAME DRUG ADON: CPT

## 2019-07-14 PROCEDURE — 36415 COLL VENOUS BLD VENIPUNCTURE: CPT | Performed by: INTERNAL MEDICINE

## 2019-07-14 PROCEDURE — 25000128 H RX IP 250 OP 636: Performed by: INTERNAL MEDICINE

## 2019-07-14 PROCEDURE — G0378 HOSPITAL OBSERVATION PER HR: HCPCS

## 2019-07-14 RX ORDER — ALBUTEROL SULFATE 90 UG/1
2 AEROSOL, METERED RESPIRATORY (INHALATION) EVERY 6 HOURS PRN
Qty: 1 INHALER | Refills: 0 | Status: SHIPPED | OUTPATIENT
Start: 2019-07-14 | End: 2020-02-23

## 2019-07-14 RX ORDER — SULFAMETHOXAZOLE/TRIMETHOPRIM 800-160 MG
1 TABLET ORAL 2 TIMES DAILY
Qty: 14 TABLET | Refills: 0 | Status: SHIPPED | OUTPATIENT
Start: 2019-07-14 | End: 2019-07-21

## 2019-07-14 RX ORDER — HYDROCHLOROTHIAZIDE 12.5 MG/1
25 CAPSULE ORAL DAILY
Qty: 30 CAPSULE | Refills: 0 | Status: SHIPPED | OUTPATIENT
Start: 2019-07-15 | End: 2020-02-23

## 2019-07-14 RX ADMIN — NICOTINE 1 PATCH: 21 PATCH, EXTENDED RELEASE TRANSDERMAL at 08:15

## 2019-07-14 RX ADMIN — HYDROCHLOROTHIAZIDE 25 MG: 12.5 CAPSULE ORAL at 08:15

## 2019-07-14 RX ADMIN — VANCOMYCIN HYDROCHLORIDE 1500 MG: 10 INJECTION, POWDER, LYOPHILIZED, FOR SOLUTION INTRAVENOUS at 05:24

## 2019-07-14 NOTE — DISCHARGE SUMMARY
[facility]  Hospitalist Discharge Summary       Date of Admission:  7/12/2019  Date of Discharge:  7/14/2019  Discharging Provider: Gretchen Del Toro MD      Discharge Diagnoses     1. RLE cellulitis.    2. COPD without acute exacerbation.    3. HTN.    Follow-ups Needed After Discharge   Follow-up Appointments     Follow-up and recommended labs and tests       Follow up with primary care provider, Martínez Mtahur, within 7 days for   hospital follow- up.  No follow up labs or test are needed.             Unresulted Labs Ordered in the Past 30 Days of this Admission     Date and Time Order Name Status Description    7/12/2019 1733 Blood culture Preliminary     7/12/2019 1731 Blood culture Preliminary       These results will be followed up by PCP.    Discharge Disposition   Discharged to home  Condition at discharge: Stable    Hospital Course      Jaswant Sawyer is a 79 year old male with a history of untreated hypertension admitted on 7/12/2019 with ongoing right lower extremity erythema and swelling refractory to outpatient antibiotic therapy.     Began few days after lancing of a growth behind his right knee that subsequently grew out MRSA on 6/1/2019-sensitivities are available in care everywhere and it looks pansensitive..  This was treated with topical therapy he did not seek treatment until 7/2/2019 at which time ultrasound was completed negative for DVT, and he was placed on cephalexin.  Symptoms failed to improve so he was seen on 7/8/2019 and placed on doxycycline.  Reevaluated 7/12/2019 still no improvement and so sent to the emergency room for further evaluation.  He has no systemic symptoms.     Problem List:   1. Right lower extremity swelling and erythema: Cellulitis versus other.  CRP is undetectable, ultrasound negative for DVT on 7/2/2019, interestingly has a lymphocytosis not a neutrophilia.  His wound swab from 6/1/2019 did show MRSA-pansensitive, copy to the bottom of this note.   Failed cephalexin, and then doxycycline therapy.   I have asked for infectious disease consultation to help guide therapy specifically duration.  2. Hypertension: Untreated, given degree of hypertension he is high risk for stroke, MI, kidney injury.  He is reluctantly agreed to let me try him on some medication. will treat with hydrochlorothiazide starting at 25 mg a day.  Will require a BMP and recheck of BP in 2 weeks  3. COPD by exam- have ordered an albuterol inhaler, his chronic cough is likely related to increase in the hospital while he is not smoking.  4. Tobacco use: Long-standing, high risk for complications including cardiovascular disease, diffuse types of cancers.  Discussed with patient he understands the risk of smoking.        Consultations This Hospital Stay   INFECTIOUS DISEASES IP CONSULT  INFECTIOUS DISEASES IP CONSULT  PHARMACY TO DOSE VANCO    Code Status   Full Code    Time Spent on this Encounter   I, Gretchen Del Toro, personally saw the patient today and spent less than or equal to 30 minutes discharging this patient.       Gretchen Del Toro MD  [facility]  ______________________________________________________________________    Physical Exam   Vital Signs: Temp: 97.3  F (36.3  C) Temp src: Oral BP: 133/89 Pulse: 77 Heart Rate: 66 Resp: 20 SpO2: 92 % O2 Device: None (Room air)    Weight: 218 lbs 12.8 oz    Gen - AAO x 3 in NAD.  Lungs - diminished BS.  Heart - RR,S1+S2 nml, no m/g/r.  Abd - soft, NT, ND, + BS.  Ext - + swelling and redness RLE which are receding. No ttp.       Primary Care Physician   Martínez Mathur    Discharge Orders      Follow-up and recommended labs and tests     Follow up with primary care provider, Martínez Mathur, within 7 days for hospital follow- up.  No follow up labs or test are needed.     Activity    Your activity upon discharge: activity as tolerated     Full Code     Diet    Follow this diet upon discharge: Orders Placed This Encounter       Regular Diet Adult       Significant Results and Procedures   Results for orders placed or performed during the hospital encounter of 07/02/19   US Lower Extremity Venous Duplex Right    Narrative    ULTRASOUND LOWER EXTREMITY VENOUS DUPLEX RIGHT  7/2/2019 1:45 PM    HISTORY:  Swelling, redness, evaluate for DVT.    TECHNIQUE:  Venous Doppler US.? Color flow and spectral Doppler with  waveform analysis performed.      Impression    IMPRESSION:  No evidence of lower extremity deep venous thrombosis.    JAYNA GARCIA MD       Discharge Medications   Current Discharge Medication List      START taking these medications    Details   albuterol (PROAIR HFA/PROVENTIL HFA/VENTOLIN HFA) 108 (90 Base) MCG/ACT inhaler Inhale 2 puffs into the lungs every 6 hours as needed for wheezing  Qty: 1 Inhaler, Refills: 0    Associated Diagnoses: Chronic obstructive pulmonary disease, unspecified COPD type (H)      hydrochlorothiazide (MICROZIDE) 12.5 MG capsule Take 2 capsules (25 mg) by mouth daily  Qty: 30 capsule, Refills: 0    Associated Diagnoses: Essential hypertension      sulfamethoxazole-trimethoprim (BACTRIM DS/SEPTRA DS) 800-160 MG tablet Take 1 tablet by mouth 2 times daily for 7 days  Qty: 14 tablet, Refills: 0    Associated Diagnoses: Cellulitis, unspecified cellulitis site         STOP taking these medications       doxycycline monohydrate (MONODOX) 100 MG capsule Comments:   Reason for Stopping:             Allergies   No Known Allergies

## 2019-07-14 NOTE — PLAN OF CARE
PRIMARY DIAGNOSIS: SOFT TISSUE INFECTIONS(Cellulitis)   OUTPATIENT/OBSERVATION GOALS TO BE MET BEFORE DISCHARGE:  Vitals sign stable or return to baseline: Yes    Tolerating oral antibiotics or has home infusion set up if applicable: No, IV vanco    Pain status: Pain free.    Return to near baseline physical activity: Yes    Discharge Planner Nurse   Safe discharge environment identified: Yes  Barriers to discharge: Yes (current IV antibiotic use)       Entered by: Esthela Bledsoe 07/13/2019 10:37 PM     Please review provider order for any additional goals.     Nurse to notify provider when observation goals have been met and patient is ready for discharge.    Pt A&O during shift although forgetful at times. VSS. Denies pain. CMS intact. RLE red w/ mild edema and elevated on pillows. Pt independent in room. Voiding adequate amounts. Tolerating regular diet. Plan is to go home on PO antibiotics.

## 2019-07-14 NOTE — PROGRESS NOTES
PRIMARY DIAGNOSIS: SOFT TISSUE INFECTIONS (Cellulitis)   OUTPATIENT/OBSERVATION GOALS TO BE MET BEFORE DISCHARGE:  1. Vitals sign stable or return to baseline: Yes     2. Tolerating oral antibiotics or has home infusion set up if applicable: No, pt  continues on abx     3. Pain status: Denies pain.     4. Return to near baseline physical activity: Yes     Discharge Planner Nurse   Safe discharge environment identified: Yes  Barriers to discharge: Yes - currently on IV antibiotics       Entered by: Vin Musa RN  Please review provider order for any additional goals.      Nurse to notify provider when observation goals have been met and patient is ready for discharge

## 2019-07-14 NOTE — PHARMACY-VANCOMYCIN DOSING SERVICE
Pharmacy Vancomycin Initial Note  Date of Service 2019  Patient's  1940  79 year old, male    Indication: Skin and Soft Tissue Infection    Current estimated CrCl = Estimated Creatinine Clearance: 80.7 mL/min (based on SCr of 0.95 mg/dL).    Creatinine for last 3 days  2019:  4:02 PM Creatinine 0.95 mg/dL    Recent Vancomycin Level(s) for last 3 days  No results found for requested labs within last 72 hours.      Vancomycin IV Administrations (past 72 hours)                   vancomycin 1500 mg in 0.9% NaCl 250 ml intermittent infusion 1,500 mg (mg) 1,500 mg Given 19 1725                Nephrotoxins and other renal medications (From now, onward)    Start     Dose/Rate Route Frequency Ordered Stop    19 1700  vancomycin 1500 mg in 0.9% NaCl 250 ml intermittent infusion 1,500 mg      1,500 mg  over 90 Minutes Intravenous EVERY 12 HOURS 19 1648            Contrast Orders - past 72 hours (72h ago, onward)    None                Plan:  1.  Start vancomycin  1500 mg IV q12h.   2.  Goal Trough Level: 10-14 per Dr. Marcum   3.  Pharmacy will check trough levels as appropriate in 1-3 Days.    4. Serum creatinine levels will be ordered a minimum of twice weekly.    5. [place] method utilized to dose vancomycin therapy: Method 1    Misael Wheeler

## 2019-07-14 NOTE — PLAN OF CARE
Pt verbalizes understanding of discharge plan, medication regimen and follow up care.     PRIMARY DIAGNOSIS: SOFT TISSUE INFECTIONS (Cellulitis)   OUTPATIENT/OBSERVATION GOALS TO BE MET BEFORE DISCHARGE:  Vitals sign stable or return to baseline: Yes     Tolerating oral antibiotics or has home infusion set up if applicable: Pt will discharge with oral antibiotics.      Pain status: Denies.     Return to near baseline physical activity: Yes     Discharge Planner Nurse   Safe discharge environment identified: Yes  Barriers to discharge:discharging today.        Entered by: Dia Riggins RN  Please review provider order for any additional goals.      Nurse to notify provider when observation goals have been met and patient is ready for discharge

## 2019-07-14 NOTE — PROGRESS NOTES
PRIMARY DIAGNOSIS: SOFT TISSUE INFECTIONS (Cellulitis)   OUTPATIENT/OBSERVATION GOALS TO BE MET BEFORE DISCHARGE:  1. Vitals sign stable or return to baseline: Yes     2. Tolerating oral antibiotics or has home infusion set up if applicable: No, pt continues  on IV vancomycin     3. Pain status: Denies.     4. Return to near baseline physical activity: Yes     Discharge Planner Nurse   Safe discharge environment identified: Yes  Barriers to discharge: Yes - currently on IV antibiotics       Entered by: Vin Musa RN  Please review provider order for any additional goals.      Nurse to notify provider when observation goals have been met and patient is ready for discharge

## 2019-07-15 LAB
BASOPHILS # BLD AUTO: 0.2 10E9/L (ref 0–0.2)
BASOPHILS NFR BLD AUTO: 1 %
DIFFERENTIAL METHOD BLD: ABNORMAL
EOSINOPHIL # BLD AUTO: 0.7 10E9/L (ref 0–0.7)
EOSINOPHIL NFR BLD AUTO: 4 %
ERYTHROCYTE [DISTWIDTH] IN BLOOD BY AUTOMATED COUNT: 13.6 % (ref 10–15)
HCT VFR BLD AUTO: 49.6 % (ref 40–53)
HGB BLD-MCNC: 16 G/DL (ref 13.3–17.7)
LYMPHOCYTES # BLD AUTO: 9.5 10E9/L (ref 0.8–5.3)
LYMPHOCYTES NFR BLD AUTO: 57 %
MCH RBC QN AUTO: 32.3 PG (ref 26.5–33)
MCHC RBC AUTO-ENTMCNC: 32.3 G/DL (ref 31.5–36.5)
MCV RBC AUTO: 100 FL (ref 78–100)
MONOCYTES # BLD AUTO: 1 10E9/L (ref 0–1.3)
MONOCYTES NFR BLD AUTO: 6 %
NEUTROPHILS # BLD AUTO: 5.3 10E9/L (ref 1.6–8.3)
NEUTROPHILS NFR BLD AUTO: 32 %
PLATELET # BLD AUTO: 165 10E9/L (ref 150–450)
PLATELET # BLD EST: ABNORMAL 10*3/UL
RBC # BLD AUTO: 4.96 10E12/L (ref 4.4–5.9)
RBC MORPH BLD: ABNORMAL
SMUDGE CELLS BLD QL SMEAR: PRESENT
WBC # BLD AUTO: 16.7 10E9/L (ref 4–11)

## 2019-07-18 LAB
BACTERIA SPEC CULT: NO GROWTH
BACTERIA SPEC CULT: NO GROWTH
Lab: NORMAL
Lab: NORMAL
SPECIMEN SOURCE: NORMAL
SPECIMEN SOURCE: NORMAL

## 2020-02-23 ENCOUNTER — APPOINTMENT (OUTPATIENT)
Dept: GENERAL RADIOLOGY | Facility: CLINIC | Age: 80
DRG: 190 | End: 2020-02-23
Attending: EMERGENCY MEDICINE
Payer: COMMERCIAL

## 2020-02-23 ENCOUNTER — HOSPITAL ENCOUNTER (INPATIENT)
Facility: CLINIC | Age: 80
LOS: 2 days | Discharge: HOME | DRG: 190 | End: 2020-02-25
Attending: EMERGENCY MEDICINE | Admitting: INTERNAL MEDICINE
Payer: COMMERCIAL

## 2020-02-23 ENCOUNTER — APPOINTMENT (OUTPATIENT)
Dept: CT IMAGING | Facility: CLINIC | Age: 80
DRG: 190 | End: 2020-02-23
Attending: EMERGENCY MEDICINE
Payer: COMMERCIAL

## 2020-02-23 DIAGNOSIS — J44.1 COPD EXACERBATION (H): Primary | ICD-10-CM

## 2020-02-23 DIAGNOSIS — J18.9 PNEUMONIA OF LEFT LOWER LOBE DUE TO INFECTIOUS ORGANISM: ICD-10-CM

## 2020-02-23 DIAGNOSIS — R06.03 RESPIRATORY DISTRESS: ICD-10-CM

## 2020-02-23 DIAGNOSIS — R10.9 LEFT FLANK PAIN: ICD-10-CM

## 2020-02-23 DIAGNOSIS — R09.02 HYPOXIA: ICD-10-CM

## 2020-02-23 DIAGNOSIS — R31.9 HEMATURIA, UNSPECIFIED TYPE: ICD-10-CM

## 2020-02-23 LAB
ALBUMIN UR-MCNC: 30 MG/DL
ANION GAP SERPL CALCULATED.3IONS-SCNC: 9 MMOL/L (ref 3–14)
APPEARANCE UR: CLEAR
BASE EXCESS BLDV CALC-SCNC: 1.5 MMOL/L
BASOPHILS # BLD AUTO: 0 10E9/L (ref 0–0.2)
BASOPHILS NFR BLD AUTO: 0 %
BILIRUB UR QL STRIP: NEGATIVE
BUN SERPL-MCNC: 22 MG/DL (ref 7–30)
CALCIUM SERPL-MCNC: 8.9 MG/DL (ref 8.5–10.1)
CHLORIDE SERPL-SCNC: 104 MMOL/L (ref 94–109)
CO2 SERPL-SCNC: 26 MMOL/L (ref 20–32)
COLOR UR AUTO: YELLOW
CREAT SERPL-MCNC: 1.02 MG/DL (ref 0.66–1.25)
D DIMER PPP FEU-MCNC: 0.5 UG/ML FEU (ref 0–0.5)
DIFFERENTIAL METHOD BLD: ABNORMAL
EOSINOPHIL # BLD AUTO: 0.2 10E9/L (ref 0–0.7)
EOSINOPHIL NFR BLD AUTO: 1 %
ERYTHROCYTE [DISTWIDTH] IN BLOOD BY AUTOMATED COUNT: 13.2 % (ref 10–15)
FLUAV+FLUBV AG SPEC QL: NEGATIVE
FLUAV+FLUBV AG SPEC QL: NEGATIVE
GFR SERPL CREATININE-BSD FRML MDRD: 70 ML/MIN/{1.73_M2}
GLUCOSE SERPL-MCNC: 151 MG/DL (ref 70–99)
GLUCOSE UR STRIP-MCNC: NEGATIVE MG/DL
HCO3 BLDV-SCNC: 26 MMOL/L (ref 21–28)
HCT VFR BLD AUTO: 49.2 % (ref 40–53)
HGB BLD-MCNC: 16.3 G/DL (ref 13.3–17.7)
HGB UR QL STRIP: ABNORMAL
KETONES UR STRIP-MCNC: NEGATIVE MG/DL
LACTATE BLD-SCNC: 1.8 MMOL/L (ref 0.7–2)
LACTATE BLD-SCNC: 3.1 MMOL/L (ref 0.7–2)
LEUKOCYTE ESTERASE UR QL STRIP: NEGATIVE
LYMPHOCYTES # BLD AUTO: 9 10E9/L (ref 0.8–5.3)
LYMPHOCYTES NFR BLD AUTO: 45 %
MCH RBC QN AUTO: 32.3 PG (ref 26.5–33)
MCHC RBC AUTO-ENTMCNC: 33.1 G/DL (ref 31.5–36.5)
MCV RBC AUTO: 97 FL (ref 78–100)
MONOCYTES # BLD AUTO: 1.6 10E9/L (ref 0–1.3)
MONOCYTES NFR BLD AUTO: 8 %
MUCOUS THREADS #/AREA URNS LPF: PRESENT /LPF
NEUTROPHILS # BLD AUTO: 9.2 10E9/L (ref 1.6–8.3)
NEUTROPHILS NFR BLD AUTO: 46 %
NITRATE UR QL: NEGATIVE
NT-PROBNP SERPL-MCNC: 196 PG/ML (ref 0–1800)
O2/TOTAL GAS SETTING VFR VENT: 2 %
PCO2 BLDV: 39 MM HG (ref 40–50)
PH BLDV: 7.43 PH (ref 7.32–7.43)
PH UR STRIP: 6 PH (ref 5–7)
PLATELET # BLD AUTO: 152 10E9/L (ref 150–450)
PLATELET # BLD EST: ABNORMAL 10*3/UL
PO2 BLDV: 36 MM HG (ref 25–47)
POTASSIUM SERPL-SCNC: 3.7 MMOL/L (ref 3.4–5.3)
RBC # BLD AUTO: 5.05 10E12/L (ref 4.4–5.9)
RBC #/AREA URNS AUTO: 52 /HPF (ref 0–2)
RBC MORPH BLD: ABNORMAL
SODIUM SERPL-SCNC: 139 MMOL/L (ref 133–144)
SOURCE: ABNORMAL
SP GR UR STRIP: 1.03 (ref 1–1.03)
SPECIMEN SOURCE: NORMAL
TROPONIN I SERPL-MCNC: <0.015 UG/L (ref 0–0.04)
UROBILINOGEN UR STRIP-MCNC: 2 MG/DL (ref 0–2)
WBC # BLD AUTO: 20 10E9/L (ref 4–11)
WBC #/AREA URNS AUTO: 12 /HPF (ref 0–5)

## 2020-02-23 PROCEDURE — 93005 ELECTROCARDIOGRAM TRACING: CPT

## 2020-02-23 PROCEDURE — 25000128 H RX IP 250 OP 636: Performed by: INTERNAL MEDICINE

## 2020-02-23 PROCEDURE — 83880 ASSAY OF NATRIURETIC PEPTIDE: CPT | Performed by: EMERGENCY MEDICINE

## 2020-02-23 PROCEDURE — 36415 COLL VENOUS BLD VENIPUNCTURE: CPT | Performed by: EMERGENCY MEDICINE

## 2020-02-23 PROCEDURE — 81001 URINALYSIS AUTO W/SCOPE: CPT | Performed by: EMERGENCY MEDICINE

## 2020-02-23 PROCEDURE — 96365 THER/PROPH/DIAG IV INF INIT: CPT

## 2020-02-23 PROCEDURE — 94640 AIRWAY INHALATION TREATMENT: CPT | Mod: 76

## 2020-02-23 PROCEDURE — 40000275 ZZH STATISTIC RCP TIME EA 10 MIN

## 2020-02-23 PROCEDURE — 82803 BLOOD GASES ANY COMBINATION: CPT | Performed by: EMERGENCY MEDICINE

## 2020-02-23 PROCEDURE — 25000128 H RX IP 250 OP 636: Performed by: EMERGENCY MEDICINE

## 2020-02-23 PROCEDURE — 96375 TX/PRO/DX INJ NEW DRUG ADDON: CPT

## 2020-02-23 PROCEDURE — 12000000 ZZH R&B MED SURG/OB

## 2020-02-23 PROCEDURE — 25000132 ZZH RX MED GY IP 250 OP 250 PS 637: Performed by: INTERNAL MEDICINE

## 2020-02-23 PROCEDURE — 94640 AIRWAY INHALATION TREATMENT: CPT

## 2020-02-23 PROCEDURE — 87086 URINE CULTURE/COLONY COUNT: CPT | Performed by: EMERGENCY MEDICINE

## 2020-02-23 PROCEDURE — 96368 THER/DIAG CONCURRENT INF: CPT

## 2020-02-23 PROCEDURE — 74176 CT ABD & PELVIS W/O CONTRAST: CPT

## 2020-02-23 PROCEDURE — 84484 ASSAY OF TROPONIN QUANT: CPT | Performed by: EMERGENCY MEDICINE

## 2020-02-23 PROCEDURE — 71045 X-RAY EXAM CHEST 1 VIEW: CPT

## 2020-02-23 PROCEDURE — 25000125 ZZHC RX 250: Performed by: INTERNAL MEDICINE

## 2020-02-23 PROCEDURE — 83605 ASSAY OF LACTIC ACID: CPT | Performed by: EMERGENCY MEDICINE

## 2020-02-23 PROCEDURE — 99222 1ST HOSP IP/OBS MODERATE 55: CPT | Mod: AI | Performed by: INTERNAL MEDICINE

## 2020-02-23 PROCEDURE — 87804 INFLUENZA ASSAY W/OPTIC: CPT | Performed by: INTERNAL MEDICINE

## 2020-02-23 PROCEDURE — 25800030 ZZH RX IP 258 OP 636: Performed by: EMERGENCY MEDICINE

## 2020-02-23 PROCEDURE — 25000132 ZZH RX MED GY IP 250 OP 250 PS 637: Performed by: EMERGENCY MEDICINE

## 2020-02-23 PROCEDURE — 85379 FIBRIN DEGRADATION QUANT: CPT | Performed by: EMERGENCY MEDICINE

## 2020-02-23 PROCEDURE — 25000125 ZZHC RX 250: Performed by: EMERGENCY MEDICINE

## 2020-02-23 PROCEDURE — 87040 BLOOD CULTURE FOR BACTERIA: CPT | Performed by: EMERGENCY MEDICINE

## 2020-02-23 PROCEDURE — 99291 CRITICAL CARE FIRST HOUR: CPT | Mod: 25

## 2020-02-23 PROCEDURE — 25000125 ZZHC RX 250

## 2020-02-23 PROCEDURE — 80048 BASIC METABOLIC PNL TOTAL CA: CPT | Performed by: EMERGENCY MEDICINE

## 2020-02-23 PROCEDURE — 83605 ASSAY OF LACTIC ACID: CPT | Performed by: INTERNAL MEDICINE

## 2020-02-23 PROCEDURE — 85025 COMPLETE CBC W/AUTO DIFF WBC: CPT | Performed by: EMERGENCY MEDICINE

## 2020-02-23 PROCEDURE — 25800030 ZZH RX IP 258 OP 636: Performed by: INTERNAL MEDICINE

## 2020-02-23 PROCEDURE — 36415 COLL VENOUS BLD VENIPUNCTURE: CPT | Performed by: INTERNAL MEDICINE

## 2020-02-23 RX ORDER — ONDANSETRON 4 MG/1
4 TABLET, ORALLY DISINTEGRATING ORAL EVERY 6 HOURS PRN
Status: DISCONTINUED | OUTPATIENT
Start: 2020-02-23 | End: 2020-02-25 | Stop reason: HOSPADM

## 2020-02-23 RX ORDER — LANOLIN ALCOHOL/MO/W.PET/CERES
3 CREAM (GRAM) TOPICAL
Status: DISCONTINUED | OUTPATIENT
Start: 2020-02-23 | End: 2020-02-25 | Stop reason: HOSPADM

## 2020-02-23 RX ORDER — POTASSIUM CHLORIDE 29.8 MG/ML
20 INJECTION INTRAVENOUS
Status: DISCONTINUED | OUTPATIENT
Start: 2020-02-23 | End: 2020-02-25 | Stop reason: HOSPADM

## 2020-02-23 RX ORDER — PROCHLORPERAZINE 25 MG
12.5 SUPPOSITORY, RECTAL RECTAL EVERY 12 HOURS PRN
Status: DISCONTINUED | OUTPATIENT
Start: 2020-02-23 | End: 2020-02-25 | Stop reason: HOSPADM

## 2020-02-23 RX ORDER — ONDANSETRON 2 MG/ML
4 INJECTION INTRAMUSCULAR; INTRAVENOUS EVERY 6 HOURS PRN
Status: DISCONTINUED | OUTPATIENT
Start: 2020-02-23 | End: 2020-02-25 | Stop reason: HOSPADM

## 2020-02-23 RX ORDER — ACETAMINOPHEN 325 MG/1
650 TABLET ORAL EVERY 4 HOURS PRN
Status: DISCONTINUED | OUTPATIENT
Start: 2020-02-23 | End: 2020-02-25 | Stop reason: HOSPADM

## 2020-02-23 RX ORDER — ASPIRIN 81 MG/1
324 TABLET, CHEWABLE ORAL ONCE
Status: COMPLETED | OUTPATIENT
Start: 2020-02-23 | End: 2020-02-23

## 2020-02-23 RX ORDER — BENZONATATE 100 MG/1
100 CAPSULE ORAL 3 TIMES DAILY PRN
Status: DISCONTINUED | OUTPATIENT
Start: 2020-02-23 | End: 2020-02-25 | Stop reason: HOSPADM

## 2020-02-23 RX ORDER — METHYLPREDNISOLONE SODIUM SUCCINATE 125 MG/2ML
60 INJECTION, POWDER, LYOPHILIZED, FOR SOLUTION INTRAMUSCULAR; INTRAVENOUS EVERY 12 HOURS
Status: DISCONTINUED | OUTPATIENT
Start: 2020-02-23 | End: 2020-02-25 | Stop reason: HOSPADM

## 2020-02-23 RX ORDER — METHYLPREDNISOLONE SODIUM SUCCINATE 125 MG/2ML
125 INJECTION, POWDER, LYOPHILIZED, FOR SOLUTION INTRAMUSCULAR; INTRAVENOUS ONCE
Status: COMPLETED | OUTPATIENT
Start: 2020-02-23 | End: 2020-02-23

## 2020-02-23 RX ORDER — POTASSIUM CHLORIDE 7.45 MG/ML
10 INJECTION INTRAVENOUS
Status: DISCONTINUED | OUTPATIENT
Start: 2020-02-23 | End: 2020-02-25 | Stop reason: HOSPADM

## 2020-02-23 RX ORDER — POTASSIUM CHLORIDE 1500 MG/1
20-40 TABLET, EXTENDED RELEASE ORAL
Status: DISCONTINUED | OUTPATIENT
Start: 2020-02-23 | End: 2020-02-25 | Stop reason: HOSPADM

## 2020-02-23 RX ORDER — LIDOCAINE 40 MG/G
CREAM TOPICAL
Status: DISCONTINUED | OUTPATIENT
Start: 2020-02-23 | End: 2020-02-25 | Stop reason: HOSPADM

## 2020-02-23 RX ORDER — NALOXONE HYDROCHLORIDE 0.4 MG/ML
.1-.4 INJECTION, SOLUTION INTRAMUSCULAR; INTRAVENOUS; SUBCUTANEOUS
Status: DISCONTINUED | OUTPATIENT
Start: 2020-02-23 | End: 2020-02-25 | Stop reason: HOSPADM

## 2020-02-23 RX ORDER — CEFTRIAXONE 2 G/1
2 INJECTION, POWDER, FOR SOLUTION INTRAMUSCULAR; INTRAVENOUS EVERY 24 HOURS
Status: DISCONTINUED | OUTPATIENT
Start: 2020-02-24 | End: 2020-02-25 | Stop reason: HOSPADM

## 2020-02-23 RX ORDER — SODIUM CHLORIDE 9 MG/ML
INJECTION, SOLUTION INTRAVENOUS CONTINUOUS
Status: ACTIVE | OUTPATIENT
Start: 2020-02-23 | End: 2020-02-24

## 2020-02-23 RX ORDER — BISACODYL 10 MG
10 SUPPOSITORY, RECTAL RECTAL DAILY PRN
Status: DISCONTINUED | OUTPATIENT
Start: 2020-02-23 | End: 2020-02-25 | Stop reason: HOSPADM

## 2020-02-23 RX ORDER — POTASSIUM CL/LIDO/0.9 % NACL 10MEQ/0.1L
10 INTRAVENOUS SOLUTION, PIGGYBACK (ML) INTRAVENOUS
Status: DISCONTINUED | OUTPATIENT
Start: 2020-02-23 | End: 2020-02-25 | Stop reason: HOSPADM

## 2020-02-23 RX ORDER — AZITHROMYCIN 250 MG/1
250 TABLET, FILM COATED ORAL
Status: DISCONTINUED | OUTPATIENT
Start: 2020-02-24 | End: 2020-02-25 | Stop reason: HOSPADM

## 2020-02-23 RX ORDER — IPRATROPIUM BROMIDE AND ALBUTEROL SULFATE 2.5; .5 MG/3ML; MG/3ML
3 SOLUTION RESPIRATORY (INHALATION)
Status: COMPLETED | OUTPATIENT
Start: 2020-02-23 | End: 2020-02-23

## 2020-02-23 RX ORDER — MAGNESIUM SULFATE HEPTAHYDRATE 40 MG/ML
2 INJECTION, SOLUTION INTRAVENOUS ONCE
Status: COMPLETED | OUTPATIENT
Start: 2020-02-23 | End: 2020-02-23

## 2020-02-23 RX ORDER — POTASSIUM CHLORIDE 1.5 G/1.58G
20-40 POWDER, FOR SOLUTION ORAL
Status: DISCONTINUED | OUTPATIENT
Start: 2020-02-23 | End: 2020-02-25 | Stop reason: HOSPADM

## 2020-02-23 RX ORDER — HYDROCHLOROTHIAZIDE 12.5 MG/1
12.5 CAPSULE ORAL DAILY
COMMUNITY

## 2020-02-23 RX ORDER — IPRATROPIUM BROMIDE AND ALBUTEROL SULFATE 2.5; .5 MG/3ML; MG/3ML
3 SOLUTION RESPIRATORY (INHALATION)
Status: DISCONTINUED | OUTPATIENT
Start: 2020-02-23 | End: 2020-02-25 | Stop reason: HOSPADM

## 2020-02-23 RX ORDER — IPRATROPIUM BROMIDE AND ALBUTEROL SULFATE 2.5; .5 MG/3ML; MG/3ML
SOLUTION RESPIRATORY (INHALATION)
Status: COMPLETED
Start: 2020-02-23 | End: 2020-02-23

## 2020-02-23 RX ORDER — PROCHLORPERAZINE MALEATE 5 MG
5 TABLET ORAL EVERY 6 HOURS PRN
Status: DISCONTINUED | OUTPATIENT
Start: 2020-02-23 | End: 2020-02-25 | Stop reason: HOSPADM

## 2020-02-23 RX ORDER — CEFTRIAXONE 2 G/1
2 INJECTION, POWDER, FOR SOLUTION INTRAMUSCULAR; INTRAVENOUS ONCE
Status: COMPLETED | OUTPATIENT
Start: 2020-02-23 | End: 2020-02-23

## 2020-02-23 RX ORDER — ACETAMINOPHEN 500 MG
1000 TABLET ORAL ONCE
Status: COMPLETED | OUTPATIENT
Start: 2020-02-23 | End: 2020-02-23

## 2020-02-23 RX ORDER — LIDOCAINE 4 G/G
1 PATCH TOPICAL ONCE
Status: COMPLETED | OUTPATIENT
Start: 2020-02-23 | End: 2020-02-24

## 2020-02-23 RX ORDER — POLYETHYLENE GLYCOL 3350 17 G/17G
17 POWDER, FOR SOLUTION ORAL DAILY PRN
Status: DISCONTINUED | OUTPATIENT
Start: 2020-02-23 | End: 2020-02-25 | Stop reason: HOSPADM

## 2020-02-23 RX ADMIN — IPRATROPIUM BROMIDE AND ALBUTEROL SULFATE 3 ML: .5; 3 SOLUTION RESPIRATORY (INHALATION) at 11:22

## 2020-02-23 RX ADMIN — IPRATROPIUM BROMIDE AND ALBUTEROL SULFATE 3 ML: .5; 3 SOLUTION RESPIRATORY (INHALATION) at 21:24

## 2020-02-23 RX ADMIN — METHYLPREDNISOLONE SODIUM SUCCINATE 125 MG: 125 INJECTION, POWDER, FOR SOLUTION INTRAMUSCULAR; INTRAVENOUS at 10:52

## 2020-02-23 RX ADMIN — MAGNESIUM SULFATE 2 G: 2 INJECTION INTRAVENOUS at 12:05

## 2020-02-23 RX ADMIN — SODIUM CHLORIDE: 9 INJECTION, SOLUTION INTRAVENOUS at 17:32

## 2020-02-23 RX ADMIN — IPRATROPIUM BROMIDE AND ALBUTEROL SULFATE 3 ML: .5; 3 SOLUTION RESPIRATORY (INHALATION) at 10:40

## 2020-02-23 RX ADMIN — ASPIRIN 81 MG 324 MG: 81 TABLET ORAL at 10:52

## 2020-02-23 RX ADMIN — IPRATROPIUM BROMIDE AND ALBUTEROL SULFATE 3 ML: .5; 3 SOLUTION RESPIRATORY (INHALATION) at 10:59

## 2020-02-23 RX ADMIN — CEFTRIAXONE 2 G: 2 INJECTION, POWDER, FOR SOLUTION INTRAMUSCULAR; INTRAVENOUS at 11:59

## 2020-02-23 RX ADMIN — LIDOCAINE 1 PATCH: 560 PATCH PERCUTANEOUS; TOPICAL; TRANSDERMAL at 12:11

## 2020-02-23 RX ADMIN — AZITHROMYCIN MONOHYDRATE 500 MG: 500 INJECTION, POWDER, LYOPHILIZED, FOR SOLUTION INTRAVENOUS at 12:34

## 2020-02-23 RX ADMIN — METHYLPREDNISOLONE SODIUM SUCCINATE 62.5 MG: 125 INJECTION, POWDER, FOR SOLUTION INTRAMUSCULAR; INTRAVENOUS at 20:09

## 2020-02-23 RX ADMIN — BENZONATATE 100 MG: 100 CAPSULE ORAL at 18:47

## 2020-02-23 RX ADMIN — IPRATROPIUM BROMIDE AND ALBUTEROL SULFATE 3 ML: .5; 3 SOLUTION RESPIRATORY (INHALATION) at 16:11

## 2020-02-23 RX ADMIN — ACETAMINOPHEN 1000 MG: 500 TABLET, FILM COATED ORAL at 12:11

## 2020-02-23 ASSESSMENT — ENCOUNTER SYMPTOMS
NAUSEA: 0
DYSURIA: 0
ABDOMINAL PAIN: 1
VOMITING: 0
FLANK PAIN: 1
SHORTNESS OF BREATH: 1

## 2020-02-23 ASSESSMENT — ACTIVITIES OF DAILY LIVING (ADL)
ADLS_ACUITY_SCORE: 12
ADLS_ACUITY_SCORE: 15

## 2020-02-23 NOTE — PHARMACY-ADMISSION MEDICATION HISTORY
Admission medication history interview status for this patient is complete. See [facility] admission navigator for allergy information, prior to admission medications and immunization status.     Medication history interview source(s):Patient  Medication history resources (including written lists, pill bottles, clinic record):None  Primary pharmacy: [facility]    Changes made to PTA medication list:  Added: None  Deleted: Albuterol  Changed: hydrochlorothiazide (50mg daily --> 25mg daily)    Actions taken by pharmacist (provider contacted, etc):None     Additional medication history information:Patient reported his hydrochlorothiazide was recently decreased by his primary provider due to his blood pressure being too low.     Medication reconciliation/reorder completed by provider prior to medication history?  No     Do you take OTC medications (eg tylenol, ibuprofen, fish oil, eye/ear drops, etc)? No     For patients on insulin therapy: No      Prior to Admission medications    Medication Sig Last Dose Taking? Auth Provider   hydrochlorothiazide (HYDRODIURIL) 12.5 MG tablet Take 12.5 mg by mouth daily 2/22/2020 at Unknown time Yes Unknown, Entered By History

## 2020-02-23 NOTE — H&P
[facility]    History and Physical  Hospitalist    Name: Jaswant Sawyer    MRN: [id number]  YOB: 1940    Age: 79 year old  Primary care provider: Martínez Mathur       Date of Admission:  2/23/2020  Date of Service (when I saw the patient): 02/23/20    Assessment & Plan   Jaswant Sawyer is a 79 year old male with a past medical history significant for hypertension, nicotine dependence who presents with cough,  shortness of breath and some left flank pain.    #Acute COPD exacerbation.  #Possible community-acquired pneumonia  Patient has a possible diagnosis of COPD in the past, although he is not on any maintenance inhalers.  He feels better now after receiving multiple nebulizers.  Chest x-ray suggestive of possible infection or pneumonia.  White count is elevated 20,000 even before the steroids.  Wearing oxygen supplementation.  He has a chronic cough but may be slightly worse now.  -Continue IV Solu-Medrol 60 mg twice daily  - IV ceftriaxone and azithromycin  - Scheduled nebulizers  -Supportive cares    #Left flank pain.  Appears to be due to the cough and breathing issues.  Troponin is negative despite the pain being present for more than 24 hours.  D-dimer is normal.  CT abdomen was obtained which did not show any obstructing stones.  Patient feels that the pain is already better  - Supportive cares, monitor for any worsening of pain    #Hypertension    #Nicotine dependence.  Discussed with the patient about importance of smoking cessation.  Counseled on that.  He declines nicotine replacement products.    #Bladder stone.  This has been noted previously as well.  He has prostate enlargement.  Should follow-up with urology as an outpatient.    DVT Prophylaxis: Pneumatic Compression Devices  Code Status: Full Code    Disposition: Expected discharge in 2-3 days once hypoxia resolves.    Plan of care was discussed with the patient in great detail. All of the questions were answered  extensively. Patient is comfortable with the plan and agrees with it.     Kei Blancas    Chief Complaint   SOB    History is obtained from the patient     Case discussed with ER provider Dr. Brewer     History of Present Illness   Jaswant Sawyer is a 79 year old male who presents with shortness of breath.  Patient reports that he has not been sleeping very well recently.  He woke up yesterday morning with a feeling of some pain in the left side of his flank area.  Nonradiating.  He also has been feeling more shortness of breath since yesterday.  Has been having some increased cough.  No chest pain per se.  No urinary issues.  No fevers or chills.  Does not remember any sick contacts.  No recent travel.  He feels much better after getting nebulizer and feel that the pain is relaxing in his chest feels more open.    Past Medical History    I have reviewed this patient's medical history and updated it with pertinent information if needed.   Past Medical History:   Diagnosis Date     Essential hypertension     untreated     Tobacco abuse        Past Surgical History   I have reviewed this patient's surgical history and updated it with pertinent information if needed.  Past Surgical History:   Procedure Laterality Date     APPENDECTOMY       BLADDER SURGERY      bladder stone removal      TURP         Prior to Admission Medications   Prior to Admission Medications   Prescriptions Last Dose Informant Patient Reported? Taking?   hydrochlorothiazide (HYDRODIURIL) 12.5 MG tablet 2/22/2020 at Unknown time  Yes Yes   Sig: Take 12.5 mg by mouth daily      Facility-Administered Medications: None     Allergies   No Known Allergies    Social History   I have reviewed this patient's social history and updated it with pertinent information if needed.   Social History     Socioeconomic History     Marital status: Single     Spouse name: Not on file     Number of children: Not on file     Years of education: Not on file      Highest education level: Not on file   Occupational History     Not on file   Social Needs     Financial resource strain: Not on file     Food insecurity:     Worry: Not on file     Inability: Not on file     Transportation needs:     Medical: Not on file     Non-medical: Not on file   Tobacco Use     Smoking status: Current Every Day Smoker     Packs/day: 1.00   Substance and Sexual Activity     Alcohol use: Not Currently     Drug use: Not on file     Sexual activity: Not on file   Lifestyle     Physical activity:     Days per week: Not on file     Minutes per session: Not on file     Stress: Not on file   Relationships     Social connections:     Talks on phone: Not on file     Gets together: Not on file     Attends Worship service: Not on file     Active member of club or organization: Not on file     Attends meetings of clubs or organizations: Not on file     Relationship status: Not on file     Intimate partner violence:     Fear of current or ex partner: Not on file     Emotionally abused: Not on file     Physically abused: Not on file     Forced sexual activity: Not on file   Other Topics Concern     Not on file   Social History Narrative     Not on file         Family History   Reviewed and noncontributory to the visit today.  Accompanied by his son at bedside.    Review of Systems   The 10 point Review of Systems is negative other than noted in the HPI or here.     Physical Exam   Temp: 99.1  F (37.3  C) Temp src: Temporal BP: 118/69 Pulse: 89 Heart Rate: 92 Resp: 23 SpO2: 92 % O2 Device: Nasal cannula Oxygen Delivery: 3 LPM  Vital Signs with Ranges  Temp:  [98.2  F (36.8  C)-99.9  F (37.7  C)] 99.1  F (37.3  C)  Pulse:  [] 89  Heart Rate:  [] 92  Resp:  [11-29] 23  BP: (105-164)/() 118/69  SpO2:  [92 %-95 %] 92 %  0 lbs 0 oz    GENERAL:  Awake and alert, Comfortable appearing, No acute distress.  PSYCH: Pleasant, Cooperative, normal affect, no hallucinations   EYES: EOMI, conjunctiva  clear  HEENT:  Head is normocephalic, atraumatic, Neck is Supple, trachea is midline   CARDIOVASCULAR: Regular rate and rhythm, Normal S1, S2, no loud murmurs, no rubs or gallops.   PULMONARY: Mild expiratory wheezing.  CHEST: Good inspiratory effort bilaterally   GI: Abdomen is soft, non tender, non-distended, no masses palpated, normal bowel sounds. No rebound or guarding   SKIN:  Dry, warm to touch. No obvious exanthems on exposed areas  EXTREMITIES:  Good capillary refill with signs of adequate peripheral perfusion. No peripheral edema   Neuro: Awake and oriented x 3. No focal weakness or numbness is noted. Moving all extremities with good strength, Grossly non-focal.   MSK: Good range of motion in all major joints of upper and lower extremity, No acute joint synovitis of the major joints is noted.     Data   Data reviewed today:  I personally reviewed .    All lab data and imaging results from today have been reviewed.     Recent Labs   Lab 02/23/20  1046   WBC 20.0*   HGB 16.3   MCV 97         POTASSIUM 3.7   CHLORIDE 104   CO2 26   BUN 22   CR 1.02   ANIONGAP 9   REMI 8.9   *   TROPI <0.015       Recent Results (from the past 24 hour(s))   XR Chest Port 1 View    Narrative    XR CHEST PORT 1 VW 2/23/2020 11:00 AM    HISTORY: shortness of breath, left sided pain    COMPARISON: None.      Impression    IMPRESSION: The cardiac silhouette is within normal limits. Bibasilar  opacities, left more than right, may be due to atelectasis or  pneumonitis. Clinical correlation. There may be a trace left pleural  effusion. The cardiac silhouette is within normal limits. No  pneumothorax.    KIMBERLI WANG MD   CT Abdomen Pelvis w/o Contrast    Narrative    CT ABDOMEN AND PELVIS WITHOUT CONTRAST 2/23/2020 1:51 PM     HISTORY: Left flank pain, hematuria.    COMPARISON: None.    TECHNIQUE: Axial CT images of the abdomen and pelvis from the  diaphragm to the symphysis pubis were acquired without IV  contrast.  Radiation dose for this scan was reduced using automated exposure  control, adjustment of the mA and/or kV according to patient size, or  iterative reconstruction technique.    FINDINGS: 9 mm bladder stone. No ureteral stones or hydronephrosis.  There is no perinephric fat stranding. Kidneys are normal in size and  configuration. Intrarenal stones measuring 4 mm seen in the mid to  inferior right kidney. Vascular calcification versus nonobstructing  stone in the mid kidney measuring 2 mm. Simple cyst seen in the upper  left kidney. Multiple bladder diverticula evident. Prostate is  enlarged measuring 6.5 x 5.4 x 5.4 cm. Cholelithiasis without  cholecystitis. Question some fatty infiltration of the liver, liver  otherwise unremarkable. There are moderate atherosclerotic changes of  the visualized aorta and its branches. There is no evidence of aortic  aneurysm. There are no dilated loops of small intestine or large bowel  to suggest ileus or obstruction. No splenomegaly. No definite adrenal  nodules. Pancreas grossly unremarkable. The remainder of the  visualized abdomen is unremarkable on this noncontrast scan. Survey of  the visualized bony structures demonstrates no destructive bony  lesions. Moderate fibrosis at the lung bases.      Impression    IMPRESSION:   1. Prostatomegaly, multiple bladder diverticula and a 9 mm bladder  stone.  2. No ureteral stones or hydronephrosis.  3. Tiny nonobstructing stones in the right kidney.    LO BRANNON MD

## 2020-02-23 NOTE — LETTER
Transition Communication Hand-off for Care Transitions to Next Level of Care Provider    Name: Jaswant Sawyer  : 1940  MRN #: [id number]  Primary Care Provider: Martínez Mathur  Primary Care MD Name: Martínez Mathur  Primary Clinic: [facility] [address]  [facility] [address]  Primary Care Clinic Name: [facility]  Reason for Hospitalization:  Hypoxia [R09.02]  Respiratory distress [R06.03]  Left flank pain [R10.9]  Pneumonia of left lower lobe due to infectious organism (H) [J18.1]  Hematuria, unspecified type [R31.9]  Admit Date/Time: 2020 10:26 AM  Discharge Date: 2020  Payor Source: Payor: MEDICA / Plan: [employer] / Product Type: HMO /     Reason for Communication Hand-off Referral: Admission diagnoses: COPD    Discharge Plan: home       Discharge Needs Assessment:  Needs      Most Recent Value   # of Referrals Placed by CTS  External Care Coordination, Scheduled Follow-up appointments, Communication hand-offs to next level of Care Providers   Other Resources  Other (see comment) [COPD & PNA Action Plans discussed with pt & son at bedside. ]                  Supplies     Future Labs/Procedures    Nebulizer and Supplies Order for DME - ONLY FOR DME     Process Instructions:    By signing this order, the Authorizing Provider is attesting that they have completed a face-to-face evaluation on the patient to determine their need for this equipment in the last 60 days. A new face-to-face evaluation is required each time  A new prescription for on eo f the specified items is ordered.   If an additional provider completed the evaluation, please indicate their name below.     **As of 2018, an order requisition and face sheet will print for all DME orders. Please give printed order and face sheet to patient if not obtaining product from [facility] DME closet.     Comments:    Nebulizer/Supplies Documentation:   The patient was seen 2020. After  assessment, the patient will need to be treated with ongoing nebulizer for treatment/management of COPD.    I, the undersigned, certify that the above prescribed supplies are medically necessary for this patient and is both reasonable and necessary in reference to accepted standards of medical and necessary in reference to accepted standards of medical practice in the treatment of this patient's condition and is not prescribed as a convenience.          Key Recommendations:  Key Recommendations:  Patient is still actively smoking (around 6 cigarettes/day). He was a 1 ppd smoker until recently. He expressed frustration with his medical care d/t 4 different perspectives and changes made in his daily medications. CM allowed him to vent. Patient apologized for speak out to CM. Reassured him no offense taken. Discussed his COPD, smoking habit and PNA. Reviewed action plans for both COPD & PNA. He verbalized understanding of action plans. He has no issues with filling his medications at [facility] in [place]. Offered to schedule his follow up appointment. His son, Jaswant, was present in room. He would like to go to appointment. Mondays or Fridays, after 2pm work best for him.     Yanet Feng RN, BSN, CPHN, CM      AVS/Discharge Summary is the source of truth; this is a helpful guide for improved communication of patient story

## 2020-02-23 NOTE — ED TRIAGE NOTES
Pt A&Ox4. ABCs intact. Pt reports left side pain with SOB that started yesterday. Pt reports no trauma

## 2020-02-23 NOTE — ED NOTES
[facility]  ED Nurse Handoff Report    Jaswant KEAGAN Sawyer is a 79 year old male   ED Chief complaint: Shortness of Breath  . ED Diagnosis:   Final diagnoses:   Respiratory distress - likely COPD   Pneumonia of left lower lobe due to infectious organism (H)   Hypoxia     Allergies: No Known Allergies    Code Status: Full Code  Activity level - Baseline/Home:  Independent. Activity Level - Current:   Assist X 2 Lift room needed: No. Bariatric: No   Needed: No   Isolation: No. Infection: Not Applicable.     Vital Signs:   Vitals:    02/23/20 1045 02/23/20 1055 02/23/20 1119 02/23/20 1200   BP: (!) 116/91   117/79   Pulse: 108   101   Resp: 19 29  24   Temp:   99.9  F (37.7  C)    SpO2: 92% 93%  92%       Cardiac Rhythm:  ,      Pain level:    Patient confused: No. Patient Falls Risk: Yes.   Elimination Status: Has voided   Patient Report - Initial Complaint: SOB and left side pain. Focused Assessment:   Before Duoneb  Respiratory - Breath Sounds: TAMMY; LLL; RUL; RML; RLL  TAMMY Breath Sounds: Posterior:; clear; equal bilaterally; wheezes, expiratory  LLL Breath Sounds: Posterior:; clear; equal bilaterally; diminished  RUL Breath Sounds: Posterior:; clear; equal bilaterally; wheezes, expiratory  RML Breath Sounds: Posterior:; clear; equal bilaterally; diminished  RLL Breath Sounds: Posterior:; clear; equal bilaterally; diminished   After Duoneb  Respiratory - Breath Sounds: TAMMY; LLL; RUL; RML; RLL  TAMMY Breath Sounds: Posterior:; clear; equal bilaterally; diminished  LLL Breath Sounds: Posterior:; diminished; clear; equal bilaterally  RUL Breath Sounds: Posterior:; clear; diminished; equal bilaterally  RML Breath Sounds: Posterior:; diminished; clear; equal bilaterally  RLL Breath Sounds: Posterior:; diminished; clear; equal bilaterally   Tests Performed:   Labs Ordered and Resulted from Time of ED Arrival Up to the Time of Departure from the ED   CBC WITH PLATELETS DIFFERENTIAL - Abnormal; Notable for  the following components:       Result Value    WBC 20.0 (*)     Absolute Neutrophil 9.2 (*)     Absolute Lymphocytes 9.0 (*)     Absolute Monocytes 1.6 (*)     All other components within normal limits   BASIC METABOLIC PANEL - Abnormal; Notable for the following components:    Glucose 151 (*)     All other components within normal limits   BLOOD GAS VENOUS - Abnormal; Notable for the following components:    PCO2 Venous 39 (*)     All other components within normal limits   ROUTINE UA WITH MICROSCOPIC - Abnormal; Notable for the following components:    Blood Urine Moderate (*)     Protein Albumin Urine 30 (*)     WBC Urine 12 (*)     RBC Urine 52 (*)     Mucous Urine Present (*)     All other components within normal limits   TROPONIN I   D DIMER QUANTITATIVE   LACTIC ACID WHOLE BLOOD   NT PROBNP INPATIENT   PULSE OXIMETRY NURSING   BLOOD CULTURE   BLOOD CULTURE        Treatments provided:   Medications   cefTRIAXone (ROCEPHIN) 2 g vial to attach to  ml bag for ADULTS or NS 50 ml bag for PEDS (2 g Intravenous New Bag 2/23/20 1159)   azithromycin (ZITHROMAX) 500 mg in sodium chloride 0.9 % 250 mL intermittent infusion (has no administration in time range)   magnesium sulfate 2 g in water intermittent infusion (2 g Intravenous New Bag 2/23/20 1205)   Lidocaine (LIDOCARE) 4 % Patch 1 patch (1 patch Transdermal Patch/Med Applied 2/23/20 1211)   ipratropium - albuterol 0.5 mg/2.5 mg/3 mL (DUONEB) 0.5-2.5 (3) MG/3ML neb solution (3 mLs  Given 2/23/20 1040)   ipratropium - albuterol 0.5 mg/2.5 mg/3 mL (DUONEB) neb solution 3 mL (3 mLs Nebulization Given 2/23/20 1122)   methylPREDNISolone sodium succinate (solu-MEDROL) injection 125 mg (125 mg Intravenous Given 2/23/20 1052)   aspirin (ASA) chewable tablet 324 mg (324 mg Oral Given 2/23/20 1052)   acetaminophen (TYLENOL) tablet 1,000 mg (1,000 mg Oral Given 2/23/20 1211)       Family Comments: With Andre CONDON brochure/video discussed/provided to patient:  N/A  ED  Medications:   Medications   cefTRIAXone (ROCEPHIN) 2 g vial to attach to  ml bag for ADULTS or NS 50 ml bag for PEDS (2 g Intravenous New Bag 2/23/20 1159)   azithromycin (ZITHROMAX) 500 mg in sodium chloride 0.9 % 250 mL intermittent infusion (has no administration in time range)   magnesium sulfate 2 g in water intermittent infusion (2 g Intravenous New Bag 2/23/20 1205)   Lidocaine (LIDOCARE) 4 % Patch 1 patch (1 patch Transdermal Patch/Med Applied 2/23/20 1211)   ipratropium - albuterol 0.5 mg/2.5 mg/3 mL (DUONEB) 0.5-2.5 (3) MG/3ML neb solution (3 mLs  Given 2/23/20 1040)   ipratropium - albuterol 0.5 mg/2.5 mg/3 mL (DUONEB) neb solution 3 mL (3 mLs Nebulization Given 2/23/20 1122)   methylPREDNISolone sodium succinate (solu-MEDROL) injection 125 mg (125 mg Intravenous Given 2/23/20 1052)   aspirin (ASA) chewable tablet 324 mg (324 mg Oral Given 2/23/20 1052)   acetaminophen (TYLENOL) tablet 1,000 mg (1,000 mg Oral Given 2/23/20 1211)     Drips infusing:  No  For the majority of the shift, the patient's behavior Green. Interventions performed were N/A.    Sepsis treatment initiated: No       ED Nurse Name/Phone Number: Ritchie Coyne RN,   12:18 PM    RECEIVING UNIT ED HANDOFF REVIEW    Above ED Nurse Handoff Report was reviewed: Yes  Reviewed by: Nuvia Pak RN on February 23, 2020 at 1:11 PM

## 2020-02-23 NOTE — PROGRESS NOTES
Patient arrived to unit. VS taken, WNL. PCS WNL. Ambulated from cart to bed SBA w/ GB. 3 LPM. Oriented to room and call system. Bed alarm on. Will continue to monitor.

## 2020-02-23 NOTE — ED PROVIDER NOTES
History     Chief Complaint:    Shortness of Breath      HPI   Jaswant Sawyer is a 79 year old male, with a history of hypertension and tobacco abuse, who presents with side pain and shortness of breath. Patient states he woke up yesterday morning with pain in his left side by his left flank and left upper quadrant. Later on in the day, he started having trouble breathing which worsened. He notes he could hardly sleep last night and decided today to come in. He also has had some cough and swelling in legs, although he states those symptoms are normal for the past year. He has not taken anything for his pain, but he is taking his regular medications as prescribed. He denies experiencing blood in his urine, change in urination, nausea, vomiting, or diarrhea. He denies history of allergies to medications. He doesn't believe he has emphysema, although he is a smoker.     Allergies:  The patient has no known drug allergies.    Medications:    Albuterol   Microzide  Hydrochlorothiazide    Past Medical History:    Hypertension  Tobacco abuse  Urinary retention  MRSA infection  Enlarged prostate  Smoker's cough    Past Surgical History:    Appendectomy  Bladder surgery  TURP  Cataract removal    Family History:    No past pertinent family history.    Social History:  Current every day smoker  Occasional alcohol use  Denies drug use  Marital Status:  Single [1]     Review of Systems   Respiratory: Positive for shortness of breath.    Gastrointestinal: Positive for abdominal pain. Negative for nausea and vomiting.   Genitourinary: Positive for flank pain. Negative for dysuria.   All other systems reviewed and are negative.      Physical Exam   First Vitals:  BP: (!) 164/117  Pulse: 113  Heart Rate: 105  Temp: 98.2  F (36.8  C)  Resp: 24  SpO2: 92 %      Physical Exam    Nursing note and vitals reviewed.    Constitutional: Pleasant and well groomed.          HENT:    Mouth/Throat: Oropharynx is without swelling or erythema.  Dry oral mucosa.   Eyes: Conjunctivae are normal. No scleral icterus.    Neck: Neck supple.   Cardiovascular: Tachycardic rate, regular rhythm and intact distal pulses.    Pulmonary/Chest: Has symmetric breath sounds and expiratory wheezing, increased work of breathing. Speaking in phrases.   Abdominal: Soft.  No distension. There is no tenderness. Left upper quadrant and left flank tenderness.  Musculoskeletal: No calf tenderness  Neurological:Alert and answering questions appropriately. Coordination normal.   Skin: Skin is warm and dry.   Psychiatric: Normal mood and affect.       Emergency Department Course   ECG:  Indication: shortness of breath   Time: 1032  Vent. Rate 113 bpm. OR interval 164. QRS duration 94. QT/QTc 332/455. P-R-T axis 32 -27 114.  Sinus tachycardia ST & T wave abnormality, consider lateral ischemia Abnormal ECG. Read time: 1046    Imaging:  XR Chest 1 view, portable:   IMPRESSION: The cardiac silhouette is within normal limits. Bibasilar  opacities, left more than right, may be due to atelectasis or  pneumonitis. Clinical correlation. There may be a trace left pleural  effusion. The cardiac silhouette is within normal limits. No  pneumothorax.    KIMBERLI WANG MD as per radiology.      Laboratory:  UA with Microscopic: Blood: Moderate, Albumin: 30, WBC/HPF: 12(H), RBC/HPF: 52(H), Mucous: Present, o/w WNL    Blood culture: Pending    1152 Lactic acid: 1.8    Blood culture: Pending    CBC: WBC: 20.0(H), HGB: 16.3, PLT: 152    BMP: Glucose 151(H), o/w WNL (Creatinine: 1.02)    Blood gas venous: pH: 7.43, PCOS: 39(L), PO2: 36, Bicarb: 26, Base Excess: 1.5, FlO2: 2 L/min    1129 Troponin: <0.015    D Dimer: 0.5    N-Terminal Pro BNP Inpatient: 196    Interventions:  1040 Duoneb 3 mL Nebulization  1052 Solumedrol 125 mg IV  1052 Aspirin 324 mg Oral  1059 Duoneb 3 mL Nebulization  1122 Duoneb 3 mL Nebulization  1159 Rocephin 2 g IV  1205 Manesium sulfate 2 g IV  1211 Tylenol 1000 mg  Oral  1211 Lidocaine 1 patch Transdermal  1234 Zithromax 500 mg IV    Emergency Department Course:  Nursing notes and vitals reviewed. (1034) I performed an exam of the patient as documented above.     IV inserted. Medicine administered as documented above. Blood drawn. This was sent to the lab for further testing, results above.     The patient was sent for a XR Chest 1 view portable while in the emergency department, findings above.     1110 I rechecked the patient and discussed the results of his workup thus far. No significant change in lung exam. He is not worsening.     1112 Reviewed the chest xray.     1226  I consulted with Dr. Blancas of the hospitalist services. They are in agreement to accept the patient for admission.  HR improved to 93 with treatment. BP improved 105/67, T 99.9.     Findings and plan explained to the Patient who consents to admission. Discussed the patient with Dr. Blancas, who will admit the patient to a Adult Med/Surg bed for further monitoring, evaluation, and treatment.    Impression & Plan      Medical Decision Making:  Jaswant Sawyer is a 79 year old male who presents with shortness of breath as described above.  Differential diagnosis included but was not limited to COPD exacerbation, pneumothorax, pneumonia, congestive heart failure, pulmonary embolism.  I did also consider musculoskeletal chest pain versus renal colic or pyelonephritis as etiology of the chest pain.  Lastly although atypical presentation did consider acute coronary syndrome as well.  ED evaluation is as noted above he was evaluated on arrival.  Nebs were initiated and he was placed on supplemental oxygen for his hypoxia.  IV was placed and labs were drawn and sent.  EKG revealed sinus tachycardia with some ST segment depression laterally.  Portable chest x-ray was obtained to rule out a pneumothorax.  This was negative for pneumothorax but did reveal possible pneumonia.  With 3 duo nebs the patient reports some  improvement in his symptoms.  He appears to be breathing much more comfortably and is now speaking in complete sentences.  D-dimer was negative for PE and therefore this diagnosis was not pursued further.  Initial troponin was negative.  Initial lactic acid was less than 2.  He did have an elevated white blood cell count and with the findings on chest x-ray I did initiate antibiotics for community-acquired pneumonia as well.  The urine returned as noted above concerning for hematuria.  This was sent for culture to rule out infection.  I did also order a CT scan to evaluate for kidney stone as his records show that he has had a bladder stone in the past.  As stated the patient is now resting much more comfortably.  Still requires supplemental oxygen to keep his sats above 90%.  Of spoken with Dr. Blancas who is excepted the patient for admission for ongoing evaluation monitoring and management.    Diagnosis:    ICD-10-CM    1. Respiratory distress R06.03 CBC with platelets differential     UA with Microscopic     Lactic acid whole blood     Nt probnp inpatient     Nt probnp inpatient     CANCELED: BNP    likely COPD   2. Pneumonia of left lower lobe due to infectious organism (H) J18.1    3. Hypoxia R09.02        Disposition:  Admitted to Dr. Blancas    Scribe Disclosure:  Almas COLLADO, am serving as a scribe on 2/23/2020 at 10:34 AM to personally document services performed by Comfort Brewer* based on my observations and the provider's statements to me.       Almas Thorne  2/23/2020   [facility] EMERGENCY DEPARTMENT       Comfort Brewer MD  02/23/20 [id number]

## 2020-02-23 NOTE — LETTER
Transition Communication Hand-off for Care Transitions to Next Level of Care Provider    Name: Jaswant Sawyer  : 1940  MRN #: [id number]    Key Recommendations:  Patient is still actively smoking (around 6 cigarettes/day). He was a 1 ppd smoker until recently. He expressed frustration with his medical care d/t 4 different perspectives and changes made in his daily medications. CM allowed him to vent. Patient apologized for speak out to CM. Reassured him no offense taken. Discussed his COPD, smoking habit and PNA. Reviewed action plans for both COPD & PNA. He verbalized understanding of action plans. He has no issues with filling his medications at [facility] in [place]. Offered to schedule his follow up appointment. His son, Jaswant, was present in room. He would like to go to appointment.  or , after 2pm work best for him.     Yanet Feng RN, BSN, CPHN, CM    AVS/Discharge Summary is the source of truth; this is a helpful guide for improved communication of patient story

## 2020-02-23 NOTE — PROGRESS NOTES
"[facility] RCAT     Date:2/23/2020  Admission Dx:SOB/?COPD/LLL [place]  Pulmonary History current smoker  Home Nebulizer/MDI Use: none noted  Home Oxygen:none notes  Acuity Level (RCAT flow sheet):4  Aerosol Therapy initiated: Hayley walters      Pulmonary Hygiene initiated: DB&C      Volume Expansion initiated: I.S. volumes      Current Oxygen Requirements: on R.A.  Current SpO2:95%    Re-evaluation date:2/26/2020    Patient Education: Deep breath and cough for lung expansion      See \"RT Assessments\" flow sheet for patient assessment scoring and Acuity Level Details.           "

## 2020-02-23 NOTE — PROVIDER NOTIFICATION
Provider paged at 1430: Patient arrived to unit. No orders. Should patient be on droplet isolation? Please advise. Thanks.    1442: All inpatient orders placed. No orders for isolation    1525: Droplet iso ordered

## 2020-02-24 LAB
ANION GAP SERPL CALCULATED.3IONS-SCNC: 5 MMOL/L (ref 3–14)
BACTERIA SPEC CULT: NO GROWTH
BASOPHILS # BLD AUTO: 0 10E9/L (ref 0–0.2)
BASOPHILS NFR BLD AUTO: 0 %
BUN SERPL-MCNC: 26 MG/DL (ref 7–30)
CALCIUM SERPL-MCNC: 8.8 MG/DL (ref 8.5–10.1)
CHLORIDE SERPL-SCNC: 107 MMOL/L (ref 94–109)
CO2 SERPL-SCNC: 25 MMOL/L (ref 20–32)
CREAT SERPL-MCNC: 0.94 MG/DL (ref 0.66–1.25)
DIFFERENTIAL METHOD BLD: ABNORMAL
EOSINOPHIL # BLD AUTO: 0 10E9/L (ref 0–0.7)
EOSINOPHIL NFR BLD AUTO: 0 %
ERYTHROCYTE [DISTWIDTH] IN BLOOD BY AUTOMATED COUNT: 13.1 % (ref 10–15)
GFR SERPL CREATININE-BSD FRML MDRD: 77 ML/MIN/{1.73_M2}
GLUCOSE SERPL-MCNC: 189 MG/DL (ref 70–99)
HCT VFR BLD AUTO: 43.3 % (ref 40–53)
HGB BLD-MCNC: 13.9 G/DL (ref 13.3–17.7)
INTERPRETATION ECG - MUSE: NORMAL
LYMPHOCYTES # BLD AUTO: 11.9 10E9/L (ref 0.8–5.3)
LYMPHOCYTES NFR BLD AUTO: 46 %
Lab: NORMAL
MAGNESIUM SERPL-MCNC: 2.3 MG/DL (ref 1.6–2.3)
MCH RBC QN AUTO: 31.7 PG (ref 26.5–33)
MCHC RBC AUTO-ENTMCNC: 32.1 G/DL (ref 31.5–36.5)
MCV RBC AUTO: 99 FL (ref 78–100)
MONOCYTES # BLD AUTO: 1.8 10E9/L (ref 0–1.3)
MONOCYTES NFR BLD AUTO: 7 %
NEUTROPHILS # BLD AUTO: 12.1 10E9/L (ref 1.6–8.3)
NEUTROPHILS NFR BLD AUTO: 47 %
PLATELET # BLD AUTO: 143 10E9/L (ref 150–450)
PLATELET # BLD EST: ABNORMAL 10*3/UL
POTASSIUM SERPL-SCNC: 3.6 MMOL/L (ref 3.4–5.3)
RBC # BLD AUTO: 4.39 10E12/L (ref 4.4–5.9)
RBC MORPH BLD: ABNORMAL
SODIUM SERPL-SCNC: 137 MMOL/L (ref 133–144)
SPECIMEN SOURCE: NORMAL
WBC # BLD AUTO: 25.8 10E9/L (ref 4–11)

## 2020-02-24 PROCEDURE — 94640 AIRWAY INHALATION TREATMENT: CPT

## 2020-02-24 PROCEDURE — 94640 AIRWAY INHALATION TREATMENT: CPT | Mod: 76

## 2020-02-24 PROCEDURE — 12000000 ZZH R&B MED SURG/OB

## 2020-02-24 PROCEDURE — 25000132 ZZH RX MED GY IP 250 OP 250 PS 637: Performed by: INTERNAL MEDICINE

## 2020-02-24 PROCEDURE — 83735 ASSAY OF MAGNESIUM: CPT | Performed by: INTERNAL MEDICINE

## 2020-02-24 PROCEDURE — 25000128 H RX IP 250 OP 636: Performed by: INTERNAL MEDICINE

## 2020-02-24 PROCEDURE — 25000125 ZZHC RX 250: Performed by: INTERNAL MEDICINE

## 2020-02-24 PROCEDURE — 85025 COMPLETE CBC W/AUTO DIFF WBC: CPT | Performed by: INTERNAL MEDICINE

## 2020-02-24 PROCEDURE — 99232 SBSQ HOSP IP/OBS MODERATE 35: CPT | Performed by: INTERNAL MEDICINE

## 2020-02-24 PROCEDURE — 40000275 ZZH STATISTIC RCP TIME EA 10 MIN

## 2020-02-24 PROCEDURE — 36415 COLL VENOUS BLD VENIPUNCTURE: CPT | Performed by: INTERNAL MEDICINE

## 2020-02-24 PROCEDURE — 80048 BASIC METABOLIC PNL TOTAL CA: CPT | Performed by: INTERNAL MEDICINE

## 2020-02-24 PROCEDURE — 25800030 ZZH RX IP 258 OP 636: Performed by: INTERNAL MEDICINE

## 2020-02-24 RX ADMIN — IPRATROPIUM BROMIDE AND ALBUTEROL SULFATE 3 ML: .5; 3 SOLUTION RESPIRATORY (INHALATION) at 20:17

## 2020-02-24 RX ADMIN — IPRATROPIUM BROMIDE AND ALBUTEROL SULFATE 3 ML: .5; 3 SOLUTION RESPIRATORY (INHALATION) at 12:04

## 2020-02-24 RX ADMIN — SODIUM CHLORIDE 1000 ML: 9 INJECTION, SOLUTION INTRAVENOUS at 13:32

## 2020-02-24 RX ADMIN — METHYLPREDNISOLONE SODIUM SUCCINATE 62.5 MG: 125 INJECTION, POWDER, FOR SOLUTION INTRAMUSCULAR; INTRAVENOUS at 08:09

## 2020-02-24 RX ADMIN — IPRATROPIUM BROMIDE AND ALBUTEROL SULFATE 3 ML: .5; 3 SOLUTION RESPIRATORY (INHALATION) at 15:35

## 2020-02-24 RX ADMIN — IPRATROPIUM BROMIDE AND ALBUTEROL SULFATE 3 ML: .5; 3 SOLUTION RESPIRATORY (INHALATION) at 07:53

## 2020-02-24 RX ADMIN — AZITHROMYCIN MONOHYDRATE 250 MG: 250 TABLET ORAL at 11:39

## 2020-02-24 RX ADMIN — ACETAMINOPHEN 650 MG: 325 TABLET, FILM COATED ORAL at 19:43

## 2020-02-24 RX ADMIN — CEFTRIAXONE 2 G: 2 INJECTION, POWDER, FOR SOLUTION INTRAMUSCULAR; INTRAVENOUS at 11:39

## 2020-02-24 RX ADMIN — METHYLPREDNISOLONE SODIUM SUCCINATE 62.5 MG: 125 INJECTION, POWDER, FOR SOLUTION INTRAMUSCULAR; INTRAVENOUS at 19:44

## 2020-02-24 ASSESSMENT — ACTIVITIES OF DAILY LIVING (ADL)
ADLS_ACUITY_SCORE: 14

## 2020-02-24 NOTE — PROGRESS NOTES
Sepsis Evaluation Progress Note    I was called to see Jaswant Sawyer due to a change in vital signs. He is known to have an infection.     Physical Exam   Vital Signs:  Temp: 98.9  F (37.2  C) Temp src: Temporal BP: 120/67 Pulse: 89 Heart Rate: 93 Resp: 28 SpO2: 92 % O2 Device: Nasal cannula Oxygen Delivery: 3 LPM    Lab:  Lactic Acid   Date Value Ref Range Status   02/23/2020 1.8 0.7 - 2.0 mmol/L Final     Lactate for Sepsis Protocol   Date Value Ref Range Status   02/23/2020 3.1 (H) 0.7 - 2.0 mmol/L Final     Comment:     Significant value called to and read back by  JESUS GARDNER([id number]) 2.23.20 2029 NANCY         The patient is at baseline mental status.     The rest of their physical exam is significant for patient is in no acute distress.  He has normal work of breathing.  He has very mild expiratory wheezing noted bilaterally.  Some crackles are noted the right base.  Extremities are warm and well-perfused.  Neurologically he is ANO x3.  He moves all extremities.    Assessment & Plan   Jaswant Sawyer meets SIRS criteria but does NOT have a lactate >2 or other evidence of acute organ damage.  These vital sign, lab and physical exam findings are consistent with SEPSIS.  Also suspect elevated lactic acid is secondary to nebulizer treatments administered for COPD.  Patient feels significantly improved relative to his admission.  We should continue current treatments.    Sepsis Time-Zero (time Sepsis diagnosis confirmed): On admission 02/23/20    Anti-infectives (From now, onward)    Start     Dose/Rate Route Frequency Ordered Stop    02/24/20 1200  cefTRIAXone (ROCEPHIN) 2 g vial to attach to  ml bag for ADULTS or NS 50 ml bag for PEDS      2 g  over 30 Minutes Intravenous EVERY 24 HOURS 02/23/20 1437      02/24/20 1130  azithromycin (ZITHROMAX) tablet 250 mg      250 mg Oral DAILY BEFORE LUNCH 02/23/20 1438 02/28/20 1129        Current antibiotic coverage is appropriate for source of infection.     Disposition: The  patient will remain on the current unit. We will continue to monitor this patient closely..  Kyrie Guido MD

## 2020-02-24 NOTE — PLAN OF CARE
AOx4, VSS, 3L O2, up SBA to bathroom but also voiding in urinal, calm and cooperative, [place], esteban reg diet, denies pain/nausea, Hx of nicotine dependence; declining nicotine alternatives, saline locked at midnight. Tessalon available as needed. WBC 20.0 and LA 3.1, droplet precautions. LS diminished but clear, infrequent good nonproductive cough. Lidocaine patch free period, gave pt relief at admission for L flank pain. Comes from independent living at home.

## 2020-02-24 NOTE — PLAN OF CARE
Pt A&O, vitals monitored on 3L O2.  Frequent, productive cough.  Influenza screen negative.  Triggered sepsis, lactic 3.1.  No new orders per hospitalist.  IV infusing.  Pt up with SBA.  Scheduled nebs, solu-medrol and zithromax/rocephin.  Lidocaine patch to L ribs.  Voiding adequate.  Will continue to monitor.

## 2020-02-24 NOTE — CONSULTS
Care Transition Initial Assessment - RN    Met with: Patient and son, Jaswant.  DATA   Active Problems:    COPD exacerbation (H)     PNA     COPD & PNA Action Plans discussed with patient & son at bedside.   Cognitive Status: awake, alert and oriented.  Primary Care Clinic Name: [facility] [place]  Primary Care MD Name: Martínez Mathur  Contact information and PCP information verified: Yes  Lives With: alone      Description of Support System: Supportive, Involved   Who is your support system?: Children       Insurance concerns: No Insurance issues identified  ASSESSMENT  Patient currently receives the following services:  None. His sons help as needed.         Identified issues/concerns regarding health management: Patient is still actively smoking (around 6 cigarettes/day). He was a 1 ppd smoker until recently. He expressed frustration with his medical care d/t 4 different perspectives and changes made in his daily medications. CM allowed him to vent. Patient apologized for speak out to CM. Reassured him no offense taken. Discussed his COPD, smoking habit and PNA. Reviewed action plans for both COPD & PNA. He verbalized understanding of action plans. He has no issues with filling his medications at [facility] in [place]. Offered to schedule his follow up appointment. His son, Jaswant, was present in room. He would like to go to appointment. Mondays or Fridays, after 2pm work best for him.     PLAN  Financial costs for the patient were not discussed.  Patient given options and choices for discharge.  Patient/family is agreeable to the plan?  Yes  Patient anticipates discharging to home.     Other Resources: Other (see comment)(COPD & PNA Action Plans discussed with pt & son at bedside. )  Patient anticipates needs for home equipment: No, he occassionally uses a cane.   Transportation/person available to transport on day of discharge is his son, Jaswant. He will be notified when patient is ready for discharge.    Plan/Disposition: Home   Appointments: Dr. Martínez Mathur on Friday, March 6, 2020 at 3:20pm. AVS updated.     CM will continue to follow patient until discharge for any additional needs.     Yanet Feng RN, BSN, CPHN, CM  Inpatient Care Coordination  [facility]  [phone]

## 2020-02-24 NOTE — DISCHARGE INSTRUCTIONS
Your hospital follow up appointment has been scheduled for you with Dr. Martínez Mathur at [facility] (new address - [address] [id number]) for Friday, March 6, 2020 at 3:20pm. Please bring your hospital discharge instructions, a photo ID, insurance information and any new medications with you to your appointment. Please call the clinic at [phone] if you need to reschedule.

## 2020-02-24 NOTE — PLAN OF CARE
Vitals: /69   Pulse 89   Temp 99.1  F (37.3  C) (Temporal)   Resp 23   SpO2 92%   Situation/Status: COPD excaerbation, possible CA-PNA, Testing for Inf A&B. On droplet.  Neuro: A/O x 4  Pain: Denies  Activity: Indep  Diet: Reg die  Lungs: LO-OEO-Apqmk, on 3 LPM, coughing-productive, on scheduled nebs, gave prn Tessalon.  GI/: No nausea/vomiting, + Flautus, BS x4.  Lines/drains:  ml/hr  Labs: Need to gather nasal swab for Inf A/B  Plan: Continue to monitor per POC.

## 2020-02-24 NOTE — PROGRESS NOTES
[facility]    Medicine Progress Note - Hospitalist Service       Date of Admission:  2/23/2020  Date of Service: 02/24/2020    Assessment & Plan     Jaswant KEAGAN Sawyer is a 79 year old male with a past medical history significant for hypertension, nicotine dependence who presents with cough,  shortness of breath and some left flank pain.     #Acute COPD exacerbation.  #Possible community-acquired pneumonia  Patient has a possible diagnosis of COPD in the past, although he is not on any maintenance inhalers.  (He used to be on one but someone told him that he can stop it.) He feels better after receiving multiple nebulizers.  Chest x-ray suggestive of possible infection or pneumonia.  White count is elevated 20,000 even before the steroids.  Wearing oxygen supplementation.  He has a chronic cough but may be slightly worse now.  -Continue IV Solu-Medrol 60 mg twice daily  - IV ceftriaxone and azithromycin  - Scheduled nebulizers  -Supportive cares  -WBC worse today although suspect due to steroids  -- Lactic acid elevation due to steroids and nebs, no evidence of sepsis      #Left flank pain.  Appears to be due to the cough and breathing issues.  Troponin is negative despite the pain being present for more than 24 hours.  D-dimer is normal.  CT abdomen was obtained which did not show any obstructing stones.  Patient feels that the pain is already better  - Supportive cares, pt reports that the pain has dramatically improved with treatment of PNA and COPD     #Hypertension     #Nicotine dependence.  Discussed with the patient about importance of smoking cessation.  Counseled on that.  He declines nicotine replacement products.     #Bladder stone.  This has been noted previously as well.  He has prostate enlargement.  He reports having surgery for bladder stone by urologist in [place]. Should follow-up with urology as an outpatient. Discussed with pt and son, and they verbalize understanding.     Diet:  Combination Diet Regular Diet Adult    DVT Prophylaxis: Pneumatic Compression Devices  Alfonso Catheter: not present  Code Status: Full Code      Disposition Plan   Expected discharge: 1-2 days, recommended to prior living arrangement once no hypoxia  Entered: Kei Blancas MD 02/24/2020, 2:33 PM       The patient's care was discussed with the Bedside Nurse, Patient and Patient's Family.    Kei Blancas MD  Hospitalist Service  [facility]    ______________________________________________________________________    Interval History   Chart reviewed and patient seen. Case discussed with nursing staff.     Patient feels better, flank pain has resolved, no urinary symptoms, Denies any chest pain. The shortness of breath is better. No reports of abdominal pain or vomiting. No new complaints voiced otherwise.       Data reviewed today: I reviewed all medications, new labs and imaging results over the last 24 hours. I personally reviewed    Physical Exam   Vital Signs: Temp: 98.5  F (36.9  C) Temp src: Temporal BP: 111/66 Pulse: 94 Heart Rate: 85 Resp: 20 SpO2: 92 % O2 Device: Nasal cannula Oxygen Delivery: 2 LPM  Weight: 224 lbs 3.2 oz    GENERAL:  Awake and alert, No acute distress.  PSYCH: appropriate affect, no acute agitation   HEENT:  Neck is Supple, trachea is midline, EOMI, conjunctiva clear  CARDIOVASCULAR: Regular rate and rhythm, Normal S1, S2, no loud murmurs, no rubs or gallops.   PULMONARY:  Improved air entry, some coarse sounds.   GI: Abdomen is soft, non tender, non-distended, bowel sounds present. No rebound or guarding   SKIN:  No cyanosis or clubbing, no obvious exanthems on exposed areas   MSK: Extremities are warm and well perfused. No pitting edema   Neuro: Awake and oriented x 3. Moving all extremities with good strength     Data   Recent Labs   Lab 02/24/20  0635 02/23/20  1046   WBC 25.8* 20.0*   HGB 13.9 16.3   MCV 99 97   * 152    139   POTASSIUM 3.6 3.7    CHLORIDE 107 104   CO2 25 26   BUN 26 22   CR 0.94 1.02   ANIONGAP 5 9   REMI 8.8 8.9   * 151*   TROPI  --  <0.015       No results found for this or any previous visit (from the past 24 hour(s)).  Medications       sodium chloride 0.9%  1,000 mL Intravenous Once     azithromycin  250 mg Oral Daily before lunch     cefTRIAXone  2 g Intravenous Q24H     influenza Vac Split High-Dose  0.5 mL Intramuscular Prior to discharge     ipratropium - albuterol 0.5 mg/2.5 mg/3 mL  3 mL Nebulization 4x daily     methylPREDNISolone  62.5 mg Intravenous Q12H     sodium chloride (PF)  3 mL Intracatheter Q8H

## 2020-02-25 VITALS
SYSTOLIC BLOOD PRESSURE: 120 MMHG | HEART RATE: 74 BPM | RESPIRATION RATE: 18 BRPM | OXYGEN SATURATION: 94 % | TEMPERATURE: 97.1 F | BODY MASS INDEX: 28.79 KG/M2 | DIASTOLIC BLOOD PRESSURE: 73 MMHG | WEIGHT: 224.2 LBS

## 2020-02-25 LAB
ERYTHROCYTE [DISTWIDTH] IN BLOOD BY AUTOMATED COUNT: 13.3 % (ref 10–15)
HCT VFR BLD AUTO: 43.4 % (ref 40–53)
HGB BLD-MCNC: 14.1 G/DL (ref 13.3–17.7)
MCH RBC QN AUTO: 31.9 PG (ref 26.5–33)
MCHC RBC AUTO-ENTMCNC: 32.5 G/DL (ref 31.5–36.5)
MCV RBC AUTO: 98 FL (ref 78–100)
PLATELET # BLD AUTO: 182 10E9/L (ref 150–450)
RBC # BLD AUTO: 4.42 10E12/L (ref 4.4–5.9)
WBC # BLD AUTO: 31.4 10E9/L (ref 4–11)

## 2020-02-25 PROCEDURE — 36415 COLL VENOUS BLD VENIPUNCTURE: CPT | Performed by: INTERNAL MEDICINE

## 2020-02-25 PROCEDURE — 25000128 H RX IP 250 OP 636: Performed by: INTERNAL MEDICINE

## 2020-02-25 PROCEDURE — 85027 COMPLETE CBC AUTOMATED: CPT | Performed by: INTERNAL MEDICINE

## 2020-02-25 PROCEDURE — 99239 HOSP IP/OBS DSCHRG MGMT >30: CPT | Performed by: INTERNAL MEDICINE

## 2020-02-25 PROCEDURE — 40000275 ZZH STATISTIC RCP TIME EA 10 MIN

## 2020-02-25 PROCEDURE — 94640 AIRWAY INHALATION TREATMENT: CPT

## 2020-02-25 PROCEDURE — 25000125 ZZHC RX 250: Performed by: INTERNAL MEDICINE

## 2020-02-25 PROCEDURE — 25000132 ZZH RX MED GY IP 250 OP 250 PS 637: Performed by: INTERNAL MEDICINE

## 2020-02-25 RX ORDER — BENZONATATE 100 MG/1
100 CAPSULE ORAL 3 TIMES DAILY PRN
Qty: 30 CAPSULE | Refills: 0 | Status: SHIPPED | OUTPATIENT
Start: 2020-02-25 | End: 2023-01-01

## 2020-02-25 RX ORDER — AZITHROMYCIN 250 MG/1
250 TABLET, FILM COATED ORAL
Qty: 2 TABLET | Refills: 0 | Status: SHIPPED | OUTPATIENT
Start: 2020-02-26 | End: 2020-02-28

## 2020-02-25 RX ORDER — CEFDINIR 300 MG/1
300 CAPSULE ORAL 2 TIMES DAILY
Qty: 10 CAPSULE | Refills: 0 | Status: SHIPPED | OUTPATIENT
Start: 2020-02-25 | End: 2020-03-01

## 2020-02-25 RX ORDER — IPRATROPIUM BROMIDE AND ALBUTEROL SULFATE 2.5; .5 MG/3ML; MG/3ML
3 SOLUTION RESPIRATORY (INHALATION) EVERY 4 HOURS PRN
Qty: 60 VIAL | Refills: 0 | Status: SHIPPED | OUTPATIENT
Start: 2020-02-25 | End: 2023-01-01

## 2020-02-25 RX ORDER — PREDNISONE 20 MG/1
TABLET ORAL
Qty: 20 TABLET | Refills: 0 | Status: SHIPPED | OUTPATIENT
Start: 2020-02-25 | End: 2023-01-01

## 2020-02-25 RX ADMIN — METHYLPREDNISOLONE SODIUM SUCCINATE 62.5 MG: 125 INJECTION, POWDER, FOR SOLUTION INTRAMUSCULAR; INTRAVENOUS at 08:25

## 2020-02-25 RX ADMIN — AZITHROMYCIN MONOHYDRATE 250 MG: 250 TABLET ORAL at 11:27

## 2020-02-25 RX ADMIN — IPRATROPIUM BROMIDE AND ALBUTEROL SULFATE 3 ML: .5; 3 SOLUTION RESPIRATORY (INHALATION) at 11:30

## 2020-02-25 RX ADMIN — ACETAMINOPHEN 650 MG: 325 TABLET, FILM COATED ORAL at 05:59

## 2020-02-25 ASSESSMENT — ACTIVITIES OF DAILY LIVING (ADL)
ADLS_ACUITY_SCORE: 14

## 2020-02-25 NOTE — PLAN OF CARE
A/O x 4.  VSS, afebrile.  Unable to wean off oxygen, 2L via nasal cannula.  Denies pain.  Independent in room and ramos.  Infrequent, loose, nonproductive cough.  LS diminished.   PO zithromax,  IV solumderol and IV rocephin.  Continue with plan of care.  Will continue to monitor.

## 2020-02-25 NOTE — PROGRESS NOTES
Pt is alert and oriented, lung diminished, up independently in the room/hallway.Tolerating regular diet passing flatus,+ve bowel sounds.On 2L of O2 intermittently.Infrequent cough, Continues on PO zithromax,  IV solumderol and IV rocephin. Droplet precautions continues.Plan to discharge home today

## 2020-02-25 NOTE — PLAN OF CARE
A/O x 4.  VSS, afebrile.  Saturations good with room air.  Denies pain.  Independent in room and ramos.  Tolerating regular diet.  Voiding.  LS diminished.  Infrequent nonproductive cough.  IV solumderol, IV rocephin and PO Zithromax.  Reviewed discharge instructions and medications with patient and son, answered questions.  Rx's sent to [facility] in [place].  Walked out independently.

## 2020-02-26 NOTE — PROGRESS NOTES
Transition Communication Hand-off for Care Transitions to Next Level of Care Provider    Name: Jaswant Sawyer  : 1940  MRN #: [id number]  Primary Care Provider: Martínez Mathur  Primary Care MD Name: Martínez Mathur  Primary Clinic: [facility] [address]  [facility] [address]  Primary Care Clinic Name: [facility]  Reason for Hospitalization:  Hypoxia [R09.02]  Respiratory distress [R06.03]  Left flank pain [R10.9]  Pneumonia of left lower lobe due to infectious organism (H) [J18.1]  Hematuria, unspecified type [R31.9]  Admit Date/Time: 2020 10:26 AM  Discharge Date: 2020  Payor Source: Payor: MEDICA / Plan: [employer] / Product Type: HMO /     Reason for Communication Hand-off Referral: Admission diagnoses: COPD    Discharge Plan: home       Discharge Needs Assessment:  Needs      Most Recent Value   # of Referrals Placed by CTS  External Care Coordination, Scheduled Follow-up appointments, Communication hand-offs to next level of Care Providers   Other Resources  Other (see comment) [COPD & PNA Action Plans discussed with pt & son at bedside. ]                  Supplies     Future Labs/Procedures    Nebulizer and Supplies Order for DME - ONLY FOR DME     Process Instructions:    By signing this order, the Authorizing Provider is attesting that they have completed a face-to-face evaluation on the patient to determine their need for this equipment in the last 60 days. A new face-to-face evaluation is required each time  A new prescription for on eo f the specified items is ordered.   If an additional provider completed the evaluation, please indicate their name below.     **As of 2018, an order requisition and face sheet will print for all DME orders. Please give printed order and face sheet to patient if not obtaining product from [facility] DME closet.     Comments:    Nebulizer/Supplies Documentation:   The patient was seen 2020. After  assessment, the patient will need to be treated with ongoing nebulizer for treatment/management of COPD.    I, the undersigned, certify that the above prescribed supplies are medically necessary for this patient and is both reasonable and necessary in reference to accepted standards of medical and necessary in reference to accepted standards of medical practice in the treatment of this patient's condition and is not prescribed as a convenience.          Key Recommendations:  Key Recommendations:  Patient is still actively smoking (around 6 cigarettes/day). He was a 1 ppd smoker until recently. He expressed frustration with his medical care d/t 4 different perspectives and changes made in his daily medications. CM allowed him to vent. Patient apologized for speak out to CM. Reassured him no offense taken. Discussed his COPD, smoking habit and PNA. Reviewed action plans for both COPD & PNA. He verbalized understanding of action plans. He has no issues with filling his medications at [facility] in [place]. Offered to schedule his follow up appointment. His son, Jaswant, was present in room. He would like to go to appointment. Mondays or Fridays, after 2pm work best for him.     Yanet Feng RN, BSN, CPHN, CM    Patient discharged home.     AVS/Discharge Summary is the source of truth; this is a helpful guide for improved communication of patient story          Hand off sent by Lucia Reid RN BSN

## 2020-02-28 NOTE — DISCHARGE SUMMARY
[facility]  Hospitalist Discharge Summary       Date of Admission:  2/23/2020  Date of Discharge:  2/25/2020  1:24 PM  Discharging Provider: Kei Blancas MD      Discharge Diagnoses   #Acute COPD exacerbation.  #Possible community-acquired pneumonia  #Hypertension  #Leukocytosis  #Nicotine dependence  #Bladder stone  #Enlarged prostate     Follow-ups Needed After Discharge   Follow-up Appointments     Follow-up and recommended labs and tests       Follow up with primary care provider, Martínez Mathur, within 7 days for   hospital follow- up.  The following labs/tests are recommended: CBC, BMP.        -- You are being prescribed two different inhalers and a nebulizer. The   albuterol inhaler and the nebulizer have similar medication which helps   with short term relief of breathing symptoms. The Breo-Ellipta inhaler is   more of a maintenance inhaler that your should take every day for long   term control     -- Check PFT's (Pulmonary Function Tests) once this acute illness is over   and you are back to baseline to assess your lung function and COPD     -- Found to have elevated white cell count. If this persists despite   treatment of pneumonia and weaning off steroids, then consider further   evaluation with your primary care doctor     -- Your CT scan shows presence of bladder stone and enlarged prostate.   Follow up with your urologist to discuss that             Additional follow up for PCP: Patient appears to have chronic leukocytosis with lymphocytosis. Currently WBC elevated due to steroids, please consider further evaluation helen for CLL if it remains elevated     Unresulted Labs Ordered in the Past 30 Days of this Admission     Date and Time Order Name Status Description    2/23/2020 1130 Blood culture Preliminary     2/23/2020 1128 Blood culture Preliminary           Hospital Course      Jaswant Sawyer is a 79 year old male with a past medical history significant for hypertension, nicotine  dependence who presented to the hospital with cough,  shortness of breath and some left flank pain.     #Acute COPD exacerbation.  #Possible community-acquired pneumonia  Patient has a possible diagnosis of COPD in the past, although he is not on any inhalers.  (He used to be on Symbicort but reports that someone told him that he does not need it, has  Albuterol at home).     His chest x-ray showed bibasilar opacities, possible pneumonia.  He was treated with IV steroids, antibiotics and nebulizers.  Overall, his condition is dramatically improved.  He has no further hypoxemia.  He is ambulating in the hallways.  His breathing appears a lot more comfortable, especially compared to the time of admission and appears stable for discharge home.    It is very likely that the patient has underlying COPD.  Advised him that he needs to have PFTs as an outpatient to confirm the diagnosis and for further evaluation.  It appears that he has chronic shortness of breath, cough, and will be started on maintenance inhaler.  He was also given prescription for as needed albuterol inhaler and duo-neb along with a nebulizer machine.    Extensive discussion and education of the patient and his family about COPD, management, recognition of flareups, use of maintenance versus rescue inhalers, etc. among other issues.  Also received education from the care coordinator and nursing staff.    Patient was noticed to have leukocytosis with a white count of 20,000 admission.  This has increased to 31,000 with IV steroids.  He has a significant complaint of lymphocytosis, 46%.  Neutrophils are only 47%.  This does not appear to be due to infection. Looking back at previous records his white count has chronically been elevated. He will need further evaluation for this as an outpatient.  Possibility of CLL.  Discussed with the patient and his family.    #Left flank pain.  It was 1 of the presenting symptoms.  Appears to be due to the cough  and breathing issues.  Troponin is negative despite the pain being present for more than 24 hours.  D-dimer is normal.  CT abdomen was obtained which did not show any obstructing stones.      This quickly resolved after admission with treatment of his pneumonia and COPD.     #Hypertension     #Nicotine dependence.  Discussed with the patient about importance of smoking cessation.  Counseled on that.  He declined nicotine replacement products.  Although, the time of discharge did agree with trying nicotine patches.     #Bladder stone.  This has been noted previously as well.  He has prostate enlargement.  He reports having surgery for bladder stone by urology. Should follow-up with urology as an outpatient. Discussed with pt and son, and they verbalize understanding.      #Chronic venous stasis, mild edema in the legs.  No evidence of CHF.  BNP is negative.  Advised follow-up with primary care provider.    Patient was seen and evaluated on the day of discharge.  He is overall feeling much better, he tells me that his breathing is dramatically improved compared to admission.  He strongly desires discharge home.  The left flank pain he had on admission is resolved.  Reasonable for discharge home with close outpatient follow-up.  Extensive education and teaching done.    Consultations This Hospital Stay   SOCIAL WORK IP CONSULT  CARE COORDINATOR IP CONSULT    Code Status   Prior    Time Spent on this Encounter   I, Kei Blancas MD, personally saw the patient today and spent greater than 30 minutes discharging this patient.       Kei Blancas MD  [facility]  ______________________________________________________________________    Physical Exam   Weight: 224 lbs 3.2 oz  Patient is awake and alert, no acute distress  Lungs are clear to auscultation, good air entry   Cardiac exam reveals regular rate and rhythm, normal S1, S2  Abdomen is soft, non-tender, no rebound or guarding  LE exam reveals  mild non-pitting edema, skin changes suggestive of chronic venous stasis        Primary Care Physician   Martínez Mathur    Discharge Disposition   Discharged to home  Condition at discharge: Stable        Discharge Orders      Reason for your hospital stay    COPD exacerbation   Pneumonia     Follow-up and recommended labs and tests     Follow up with primary care provider, Martínez Mathur, within 7 days for hospital follow- up.  The following labs/tests are recommended: CBC, BMP.      -- You are being prescribed two different inhalers and a nebulizer. The albuterol inhaler and the nebulizer have similar medication which helps with short term relief of breathing symptoms. The Breo-Ellipta inhaler is more of a maintenance inhaler that your should take every day for long term control     -- Check PFT's (Pulmonary Function Tests) once this acute illness is over and you are back to baseline to assess your lung function and COPD     -- Found to have elevated white cell count. If this persists despite treatment of pneumonia and weaning off steroids, then consider further evaluation with your primary care doctor     -- Your CT scan shows presence of bladder stone and enlarged prostate. Follow up with your urologist to discuss that     Activity    Your activity upon discharge: activity as tolerated     Nebulizer and Supplies Order for DME - ONLY FOR DME    Nebulizer/Supplies Documentation:   The patient was seen 02/25/2020. After assessment, the patient will need to be treated with ongoing nebulizer for treatment/management of COPD.    I, the undersigned, certify that the above prescribed supplies are medically necessary for this patient and is both reasonable and necessary in reference to accepted standards of medical and necessary in reference to accepted standards of medical practice in the treatment of this patient's condition and is not prescribed as a convenience.     Diet    Follow this diet upon discharge:  Regular  Diet Adult     Discharge Medications   Discharge Medication List as of 2/25/2020  1:03 PM      START taking these medications    Details   albuterol (PROAIR RESPICLICK) 108 (90 Base) MCG/ACT inhaler Inhale 2 puffs into the lungs every 6 hours as needed for shortness of breath / dyspnea or wheezing, Disp-1 Inhaler, R-0, E-Prescribe      azithromycin (ZITHROMAX) 250 MG tablet Take 1 tablet (250 mg) by mouth daily (before lunch) for 2 days, Disp-2 tablet, R-0, E-Prescribe      benzonatate (TESSALON) 100 MG capsule Take 1 capsule (100 mg) by mouth 3 times daily as needed for cough, Disp-30 capsule, R-0, E-PrescribeFuture refills by PCP Dr. Martínez Mathur with phone number [phone].      cefdinir (OMNICEF) 300 MG capsule Take 1 capsule (300 mg) by mouth 2 times daily for 5 days, Disp-10 capsule, R-0, E-Prescribe      fluticasone-vilanterol (BREO ELLIPTA) 100-25 MCG/INH inhaler Inhale 1 puff into the lungs daily, Disp-1 Inhaler, R-0, E-PrescribeFuture refills by PCP Dr. Martínez Mathur with phone number [phone].      ipratropium - albuterol 0.5 mg/2.5 mg/3 mL (DUONEB) 0.5-2.5 (3) MG/3ML neb solution Take 1 vial (3 mLs) by nebulization every 4 hours as needed for shortness of breath / dyspnea or wheezing, Disp-60 vial, R-0, E-PrescribeFuture refills by PCP Dr. Martínez Mathur with phone number [phone].      nicotine (NICODERM CQ) 7 MG/24HR 24 hr patch Place 1 patch onto the skin every 24 hours, Disp-14 patch, R-1, E-Prescribe      predniSONE (DELTASONE) 20 MG tablet Take 3 tabs by mouth daily x 3 days, then 2 tabs daily x 3 days, then 1 tab daily x 3 days, then 1/2 tab daily x 3 days., Disp-20 tablet, R-0, E-Prescribe         CONTINUE these medications which have NOT CHANGED    Details   hydrochlorothiazide (HYDRODIURIL) 12.5 MG tablet Take 12.5 mg by mouth daily, Historical           Allergies   No Known Allergies    Significant Results and Procedures   Results for orders placed or performed during the  hospital encounter of 02/23/20   XR Chest Port 1 View    Narrative    XR CHEST PORT 1 VW 2/23/2020 11:00 AM    HISTORY: shortness of breath, left sided pain    COMPARISON: None.      Impression    IMPRESSION: The cardiac silhouette is within normal limits. Bibasilar  opacities, left more than right, may be due to atelectasis or  pneumonitis. Clinical correlation. There may be a trace left pleural  effusion. The cardiac silhouette is within normal limits. No  pneumothorax.    KIMBERLI WANG MD   CT Abdomen Pelvis w/o Contrast    Narrative    CT ABDOMEN AND PELVIS WITHOUT CONTRAST 2/23/2020 1:51 PM     HISTORY: Left flank pain, hematuria.    COMPARISON: None.    TECHNIQUE: Axial CT images of the abdomen and pelvis from the  diaphragm to the symphysis pubis were acquired without IV contrast.  Radiation dose for this scan was reduced using automated exposure  control, adjustment of the mA and/or kV according to patient size, or  iterative reconstruction technique.    FINDINGS: 9 mm bladder stone. No ureteral stones or hydronephrosis.  There is no perinephric fat stranding. Kidneys are normal in size and  configuration. Intrarenal stones measuring 4 mm seen in the mid to  inferior right kidney. Vascular calcification versus nonobstructing  stone in the mid kidney measuring 2 mm. Simple cyst seen in the upper  left kidney. Multiple bladder diverticula evident. Prostate is  enlarged measuring 6.5 x 5.4 x 5.4 cm. Cholelithiasis without  cholecystitis. Question some fatty infiltration of the liver, liver  otherwise unremarkable. There are moderate atherosclerotic changes of  the visualized aorta and its branches. There is no evidence of aortic  aneurysm. There are no dilated loops of small intestine or large bowel  to suggest ileus or obstruction. No splenomegaly. No definite adrenal  nodules. Pancreas grossly unremarkable. The remainder of the  visualized abdomen is unremarkable on this noncontrast scan. Survey of  the  visualized bony structures demonstrates no destructive bony  lesions. Moderate fibrosis at the lung bases.      Impression    IMPRESSION:   1. Prostatomegaly, multiple bladder diverticula and a 9 mm bladder  stone.  2. No ureteral stones or hydronephrosis.  3. Tiny nonobstructing stones in the right kidney.    LO BRANNON MD

## 2020-02-29 LAB
BACTERIA SPEC CULT: NO GROWTH
BACTERIA SPEC CULT: NO GROWTH
SPECIMEN SOURCE: NORMAL
SPECIMEN SOURCE: NORMAL

## 2023-01-01 ENCOUNTER — APPOINTMENT (OUTPATIENT)
Dept: CARDIOLOGY | Facility: CLINIC | Age: 83
DRG: 871 | End: 2023-01-01
Attending: INTERNAL MEDICINE
Payer: COMMERCIAL

## 2023-01-01 ENCOUNTER — APPOINTMENT (OUTPATIENT)
Dept: CT IMAGING | Facility: CLINIC | Age: 83
DRG: 871 | End: 2023-01-01
Attending: INTERNAL MEDICINE
Payer: COMMERCIAL

## 2023-01-01 ENCOUNTER — ANESTHESIA (OUTPATIENT)
Dept: SURGERY | Facility: CLINIC | Age: 83
DRG: 871 | End: 2023-01-01
Payer: COMMERCIAL

## 2023-01-01 ENCOUNTER — APPOINTMENT (OUTPATIENT)
Dept: GENERAL RADIOLOGY | Facility: CLINIC | Age: 83
DRG: 871 | End: 2023-01-01
Attending: EMERGENCY MEDICINE
Payer: COMMERCIAL

## 2023-01-01 ENCOUNTER — ANESTHESIA EVENT (OUTPATIENT)
Dept: SURGERY | Facility: CLINIC | Age: 83
DRG: 871 | End: 2023-01-01
Payer: COMMERCIAL

## 2023-01-01 ENCOUNTER — APPOINTMENT (OUTPATIENT)
Dept: GENERAL RADIOLOGY | Facility: CLINIC | Age: 83
DRG: 871 | End: 2023-01-01
Attending: INTERNAL MEDICINE
Payer: COMMERCIAL

## 2023-01-01 ENCOUNTER — HOSPITAL ENCOUNTER (INPATIENT)
Facility: CLINIC | Age: 83
LOS: 5 days | DRG: 871 | End: 2023-02-15
Attending: EMERGENCY MEDICINE | Admitting: INTERNAL MEDICINE
Payer: COMMERCIAL

## 2023-01-01 VITALS
BODY MASS INDEX: 23.52 KG/M2 | WEIGHT: 183.3 LBS | OXYGEN SATURATION: 76 % | HEART RATE: 93 BPM | HEIGHT: 74 IN | SYSTOLIC BLOOD PRESSURE: 58 MMHG | TEMPERATURE: 97.1 F | DIASTOLIC BLOOD PRESSURE: 33 MMHG | RESPIRATION RATE: 47 BRPM

## 2023-01-01 DIAGNOSIS — J96.01 ACUTE RESPIRATORY FAILURE WITH HYPOXIA (H): ICD-10-CM

## 2023-01-01 DIAGNOSIS — J18.9 PNEUMONIA DUE TO INFECTIOUS ORGANISM, UNSPECIFIED LATERALITY, UNSPECIFIED PART OF LUNG: ICD-10-CM

## 2023-01-01 LAB
ALBUMIN SERPL BCG-MCNC: 3.3 G/DL (ref 3.5–5.2)
ALBUMIN SERPL ELPH-MCNC: 3.4 G/DL (ref 3.7–5.1)
ALP SERPL-CCNC: 206 U/L (ref 40–129)
ALPHA1 GLOB SERPL ELPH-MCNC: 0.4 G/DL (ref 0.2–0.4)
ALPHA2 GLOB SERPL ELPH-MCNC: 0.6 G/DL (ref 0.5–0.9)
ALT SERPL W P-5'-P-CCNC: 52 U/L (ref 10–50)
ANION GAP SERPL CALCULATED.3IONS-SCNC: 10 MMOL/L (ref 7–15)
ANION GAP SERPL CALCULATED.3IONS-SCNC: 11 MMOL/L (ref 7–15)
ANION GAP SERPL CALCULATED.3IONS-SCNC: 13 MMOL/L (ref 7–15)
ANION GAP SERPL CALCULATED.3IONS-SCNC: 14 MMOL/L (ref 7–15)
ANION GAP SERPL CALCULATED.3IONS-SCNC: 14 MMOL/L (ref 7–15)
ANION GAP SERPL CALCULATED.3IONS-SCNC: 16 MMOL/L (ref 7–15)
ANION GAP SERPL CALCULATED.3IONS-SCNC: 18 MMOL/L (ref 7–15)
APPEARANCE FLD: ABNORMAL
APTT PPP: 26 SECONDS (ref 22–38)
AST SERPL W P-5'-P-CCNC: ABNORMAL U/L
B-GLOBULIN SERPL ELPH-MCNC: 0.7 G/DL (ref 0.6–1)
BASE EXCESS BLDV CALC-SCNC: -1.4 MMOL/L (ref -7.7–1.9)
BASE EXCESS BLDV CALC-SCNC: 0.3 MMOL/L (ref -7.7–1.9)
BASOPHILS # BLD MANUAL: 0 10E3/UL (ref 0–0.2)
BASOPHILS NFR BLD MANUAL: 0 %
BILIRUB SERPL-MCNC: 4.5 MG/DL
BUN SERPL-MCNC: 11.1 MG/DL (ref 8–23)
BUN SERPL-MCNC: 15.3 MG/DL (ref 8–23)
BUN SERPL-MCNC: 23.4 MG/DL (ref 8–23)
BUN SERPL-MCNC: 23.8 MG/DL (ref 8–23)
BUN SERPL-MCNC: 24 MG/DL (ref 8–23)
BUN SERPL-MCNC: 24.9 MG/DL (ref 8–23)
BUN SERPL-MCNC: 28.8 MG/DL (ref 8–23)
CALCIUM SERPL-MCNC: 7.7 MG/DL (ref 8.8–10.2)
CALCIUM SERPL-MCNC: 8.4 MG/DL (ref 8.8–10.2)
CALCIUM SERPL-MCNC: 8.7 MG/DL (ref 8.8–10.2)
CALCIUM SERPL-MCNC: 8.8 MG/DL (ref 8.8–10.2)
CALCIUM SERPL-MCNC: 8.8 MG/DL (ref 8.8–10.2)
CALCIUM SERPL-MCNC: 8.9 MG/DL (ref 8.8–10.2)
CALCIUM SERPL-MCNC: 8.9 MG/DL (ref 8.8–10.2)
CELL COUNT BODY FLUID SOURCE: ABNORMAL
CHLORIDE SERPL-SCNC: 106 MMOL/L (ref 98–107)
CHLORIDE SERPL-SCNC: 107 MMOL/L (ref 98–107)
CHLORIDE SERPL-SCNC: 109 MMOL/L (ref 98–107)
CHLORIDE SERPL-SCNC: 109 MMOL/L (ref 98–107)
CHLORIDE SERPL-SCNC: 110 MMOL/L (ref 98–107)
CHLORIDE SERPL-SCNC: 112 MMOL/L (ref 98–107)
CHLORIDE SERPL-SCNC: 112 MMOL/L (ref 98–107)
COLOR FLD: ABNORMAL
CREAT SERPL-MCNC: 1.06 MG/DL (ref 0.67–1.17)
CREAT SERPL-MCNC: 1.09 MG/DL (ref 0.67–1.17)
CREAT SERPL-MCNC: 1.11 MG/DL (ref 0.67–1.17)
CREAT SERPL-MCNC: 1.11 MG/DL (ref 0.67–1.17)
CREAT SERPL-MCNC: 1.16 MG/DL (ref 0.67–1.17)
CREAT SERPL-MCNC: 1.18 MG/DL (ref 0.67–1.17)
CREAT SERPL-MCNC: 1.31 MG/DL (ref 0.67–1.17)
CREAT SERPL-MCNC: 1.39 MG/DL (ref 0.67–1.17)
CREAT SERPL-MCNC: 1.53 MG/DL (ref 0.67–1.17)
DEPRECATED HCO3 PLAS-SCNC: 16 MMOL/L (ref 22–29)
DEPRECATED HCO3 PLAS-SCNC: 21 MMOL/L (ref 22–29)
DEPRECATED HCO3 PLAS-SCNC: 21 MMOL/L (ref 22–29)
DEPRECATED HCO3 PLAS-SCNC: 22 MMOL/L (ref 22–29)
DEPRECATED HCO3 PLAS-SCNC: 24 MMOL/L (ref 22–29)
EOSINOPHIL # BLD MANUAL: 0 10E3/UL (ref 0–0.7)
EOSINOPHIL # BLD MANUAL: 0 10E3/UL (ref 0–0.7)
EOSINOPHIL # BLD MANUAL: 0.7 10E3/UL (ref 0–0.7)
EOSINOPHIL # BLD MANUAL: 1 10E3/UL (ref 0–0.7)
EOSINOPHIL NFR BLD MANUAL: 0 %
EOSINOPHIL NFR BLD MANUAL: 0 %
EOSINOPHIL NFR BLD MANUAL: 1 %
EOSINOPHIL NFR BLD MANUAL: 3 %
ERYTHROCYTE [DISTWIDTH] IN BLOOD BY AUTOMATED COUNT: 17.9 % (ref 10–15)
ERYTHROCYTE [DISTWIDTH] IN BLOOD BY AUTOMATED COUNT: 18 % (ref 10–15)
ERYTHROCYTE [DISTWIDTH] IN BLOOD BY AUTOMATED COUNT: 18.3 % (ref 10–15)
ERYTHROCYTE [DISTWIDTH] IN BLOOD BY AUTOMATED COUNT: 18.5 % (ref 10–15)
ERYTHROCYTE [DISTWIDTH] IN BLOOD BY AUTOMATED COUNT: 18.7 % (ref 10–15)
ERYTHROCYTE [DISTWIDTH] IN BLOOD BY AUTOMATED COUNT: 19.7 % (ref 10–15)
ERYTHROCYTE [DISTWIDTH] IN BLOOD BY AUTOMATED COUNT: 20.6 % (ref 10–15)
ERYTHROCYTE [DISTWIDTH] IN BLOOD BY AUTOMATED COUNT: 20.8 % (ref 10–15)
ERYTHROCYTE [DISTWIDTH] IN BLOOD BY AUTOMATED COUNT: 21.9 % (ref 10–15)
FLUAV RNA SPEC QL NAA+PROBE: NEGATIVE
FLUBV RNA RESP QL NAA+PROBE: NEGATIVE
GAMMA GLOB SERPL ELPH-MCNC: 0.7 G/DL (ref 0.7–1.6)
GFR SERPL CREATININE-BSD FRML MDRD: 45 ML/MIN/1.73M2
GFR SERPL CREATININE-BSD FRML MDRD: 51 ML/MIN/1.73M2
GFR SERPL CREATININE-BSD FRML MDRD: 55 ML/MIN/1.73M2
GFR SERPL CREATININE-BSD FRML MDRD: 62 ML/MIN/1.73M2
GFR SERPL CREATININE-BSD FRML MDRD: 63 ML/MIN/1.73M2
GFR SERPL CREATININE-BSD FRML MDRD: 67 ML/MIN/1.73M2
GFR SERPL CREATININE-BSD FRML MDRD: 67 ML/MIN/1.73M2
GFR SERPL CREATININE-BSD FRML MDRD: 68 ML/MIN/1.73M2
GFR SERPL CREATININE-BSD FRML MDRD: 71 ML/MIN/1.73M2
GLUCOSE BLDC GLUCOMTR-MCNC: 129 MG/DL (ref 70–99)
GLUCOSE SERPL-MCNC: 121 MG/DL (ref 70–99)
GLUCOSE SERPL-MCNC: 143 MG/DL (ref 70–99)
GLUCOSE SERPL-MCNC: 170 MG/DL (ref 70–99)
GLUCOSE SERPL-MCNC: 173 MG/DL (ref 70–99)
GLUCOSE SERPL-MCNC: 184 MG/DL (ref 70–99)
GLUCOSE SERPL-MCNC: 95 MG/DL (ref 70–99)
GLUCOSE SERPL-MCNC: 99 MG/DL (ref 70–99)
GRAM STAIN RESULT: NORMAL
GRAM STAIN RESULT: NORMAL
HCO3 BLDV-SCNC: 24 MMOL/L (ref 21–28)
HCO3 BLDV-SCNC: 24 MMOL/L (ref 21–28)
HCO3 BLDV-SCNC: 25 MMOL/L (ref 21–28)
HCT VFR BLD AUTO: 35.2 % (ref 40–53)
HCT VFR BLD AUTO: 36.8 % (ref 40–53)
HCT VFR BLD AUTO: 37.9 % (ref 40–53)
HCT VFR BLD AUTO: 37.9 % (ref 40–53)
HCT VFR BLD AUTO: 38.4 % (ref 40–53)
HCT VFR BLD AUTO: 39.5 % (ref 40–53)
HCT VFR BLD AUTO: 40.2 % (ref 40–53)
HCT VFR BLD AUTO: 41 % (ref 40–53)
HCT VFR BLD AUTO: 46.2 % (ref 40–53)
HGB BLD-MCNC: 11 G/DL (ref 13.3–17.7)
HGB BLD-MCNC: 11.2 G/DL (ref 13.3–17.7)
HGB BLD-MCNC: 11.5 G/DL (ref 13.3–17.7)
HGB BLD-MCNC: 11.8 G/DL (ref 13.3–17.7)
HGB BLD-MCNC: 11.8 G/DL (ref 13.3–17.7)
HGB BLD-MCNC: 12.6 G/DL (ref 13.3–17.7)
HGB BLD-MCNC: 12.8 G/DL (ref 13.3–17.7)
HGB BLD-MCNC: 13.2 G/DL (ref 13.3–17.7)
HGB BLD-MCNC: 14.8 G/DL (ref 13.3–17.7)
HOLD SPECIMEN: NORMAL
IGA SERPL-MCNC: 211 MG/DL (ref 84–499)
IGG SERPL-MCNC: 762 MG/DL (ref 610–1616)
IGM SERPL-MCNC: 16 MG/DL (ref 35–242)
INR PPP: 1.19 (ref 0.85–1.15)
KOH PREPARATION: NORMAL
KOH PREPARATION: NORMAL
LACTATE BLD-SCNC: 5.5 MMOL/L
LACTATE SERPL-SCNC: 1.6 MMOL/L (ref 0.7–2)
LACTATE SERPL-SCNC: 1.8 MMOL/L (ref 0.7–2)
LACTATE SERPL-SCNC: 1.8 MMOL/L (ref 0.7–2)
LACTATE SERPL-SCNC: 1.9 MMOL/L (ref 0.7–2)
LACTATE SERPL-SCNC: 2.5 MMOL/L (ref 0.7–2)
LACTATE SERPL-SCNC: 2.5 MMOL/L (ref 0.7–2)
LACTATE SERPL-SCNC: 2.7 MMOL/L (ref 0.7–2)
LACTATE SERPL-SCNC: 2.7 MMOL/L (ref 0.7–2)
LACTATE SERPL-SCNC: 3 MMOL/L (ref 0.7–2)
LACTATE SERPL-SCNC: 3.1 MMOL/L (ref 0.7–2)
LACTATE SERPL-SCNC: 3.1 MMOL/L (ref 0.7–2)
LACTATE SERPL-SCNC: 3.2 MMOL/L (ref 0.7–2)
LACTATE SERPL-SCNC: 3.2 MMOL/L (ref 0.7–2)
LACTATE SERPL-SCNC: 3.3 MMOL/L (ref 0.7–2)
LACTATE SERPL-SCNC: 3.3 MMOL/L (ref 0.7–2)
LACTATE SERPL-SCNC: 3.4 MMOL/L (ref 0.7–2)
LACTATE SERPL-SCNC: 3.8 MMOL/L (ref 0.7–2)
LACTATE SERPL-SCNC: 4 MMOL/L (ref 0.7–2)
LACTATE SERPL-SCNC: 4.6 MMOL/L (ref 0.7–2)
LVEF ECHO: NORMAL
LYMPHOCYTES # BLD MANUAL: 19.4 10E3/UL (ref 0.8–5.3)
LYMPHOCYTES # BLD MANUAL: 29.7 10E3/UL (ref 0.8–5.3)
LYMPHOCYTES # BLD MANUAL: 30.9 10E3/UL (ref 0.8–5.3)
LYMPHOCYTES # BLD MANUAL: 54 10E3/UL (ref 0.8–5.3)
LYMPHOCYTES NFR BLD MANUAL: 61 %
LYMPHOCYTES NFR BLD MANUAL: 67 %
LYMPHOCYTES NFR BLD MANUAL: 71 %
LYMPHOCYTES NFR BLD MANUAL: 75 %
M PROTEIN SERPL ELPH-MCNC: 0 G/DL
MAGNESIUM SERPL-MCNC: 2 MG/DL (ref 1.7–2.3)
MAGNESIUM SERPL-MCNC: 2.2 MG/DL (ref 1.7–2.3)
MAGNESIUM SERPL-MCNC: 2.4 MG/DL (ref 1.7–2.3)
MCH RBC QN AUTO: 32.9 PG (ref 26.5–33)
MCH RBC QN AUTO: 33 PG (ref 26.5–33)
MCH RBC QN AUTO: 33.1 PG (ref 26.5–33)
MCH RBC QN AUTO: 33.1 PG (ref 26.5–33)
MCH RBC QN AUTO: 33.2 PG (ref 26.5–33)
MCH RBC QN AUTO: 33.2 PG (ref 26.5–33)
MCH RBC QN AUTO: 33.5 PG (ref 26.5–33)
MCH RBC QN AUTO: 33.5 PG (ref 26.5–33)
MCH RBC QN AUTO: 34 PG (ref 26.5–33)
MCHC RBC AUTO-ENTMCNC: 29.9 G/DL (ref 31.5–36.5)
MCHC RBC AUTO-ENTMCNC: 30.4 G/DL (ref 31.5–36.5)
MCHC RBC AUTO-ENTMCNC: 31.1 G/DL (ref 31.5–36.5)
MCHC RBC AUTO-ENTMCNC: 31.1 G/DL (ref 31.5–36.5)
MCHC RBC AUTO-ENTMCNC: 31.3 G/DL (ref 31.5–36.5)
MCHC RBC AUTO-ENTMCNC: 31.8 G/DL (ref 31.5–36.5)
MCHC RBC AUTO-ENTMCNC: 31.9 G/DL (ref 31.5–36.5)
MCHC RBC AUTO-ENTMCNC: 32 G/DL (ref 31.5–36.5)
MCHC RBC AUTO-ENTMCNC: 32.2 G/DL (ref 31.5–36.5)
MCV RBC AUTO: 103 FL (ref 78–100)
MCV RBC AUTO: 103 FL (ref 78–100)
MCV RBC AUTO: 104 FL (ref 78–100)
MCV RBC AUTO: 106 FL (ref 78–100)
MCV RBC AUTO: 107 FL (ref 78–100)
MCV RBC AUTO: 110 FL (ref 78–100)
MCV RBC AUTO: 111 FL (ref 78–100)
MONOCYTES # BLD MANUAL: 1.3 10E3/UL (ref 0–1.3)
MONOCYTES # BLD MANUAL: 2.2 10E3/UL (ref 0–1.3)
MONOCYTES # BLD MANUAL: 3.7 10E3/UL (ref 0–1.3)
MONOCYTES # BLD MANUAL: 5.8 10E3/UL (ref 0–1.3)
MONOCYTES NFR BLD MANUAL: 3 %
MONOCYTES NFR BLD MANUAL: 7 %
MONOCYTES NFR BLD MANUAL: 8 %
MONOCYTES NFR BLD MANUAL: 8 %
MRSA DNA SPEC QL NAA+PROBE: NEGATIVE
NEUTROPHILS # BLD MANUAL: 10.9 10E3/UL (ref 1.6–8.3)
NEUTROPHILS # BLD MANUAL: 11.5 10E3/UL (ref 1.6–8.3)
NEUTROPHILS # BLD MANUAL: 11.5 10E3/UL (ref 1.6–8.3)
NEUTROPHILS # BLD MANUAL: 9.2 10E3/UL (ref 1.6–8.3)
NEUTROPHILS NFR BLD MANUAL: 16 %
NEUTROPHILS NFR BLD MANUAL: 25 %
NEUTROPHILS NFR BLD MANUAL: 26 %
NEUTROPHILS NFR BLD MANUAL: 29 %
NRBC # BLD AUTO: 0.5 10E3/UL
NRBC # BLD AUTO: 0.7 10E3/UL
NRBC BLD AUTO-RTO: 1 /100
NRBC BLD MANUAL-RTO: 1 %
NT-PROBNP SERPL-MCNC: 2640 PG/ML (ref 0–1800)
NT-PROBNP SERPL-MCNC: 3230 PG/ML (ref 0–1800)
NT-PROBNP SERPL-MCNC: 3239 PG/ML (ref 0–1800)
O2/TOTAL GAS SETTING VFR VENT: 4 %
O2/TOTAL GAS SETTING VFR VENT: 5 %
PATH REPORT.COMMENTS IMP SPEC: ABNORMAL
PATH REPORT.COMMENTS IMP SPEC: ABNORMAL
PATH REPORT.COMMENTS IMP SPEC: YES
PATH REPORT.FINAL DX SPEC: ABNORMAL
PATH REPORT.MICROSCOPIC SPEC OTHER STN: ABNORMAL
PATH REPORT.MICROSCOPIC SPEC OTHER STN: ABNORMAL
PATH REPORT.RELEVANT HX SPEC: ABNORMAL
PCO2 BLDV: 38 MM HG (ref 40–50)
PCO2 BLDV: 42 MM HG (ref 40–50)
PCO2 BLDV: 73 MM HG (ref 40–50)
PH BLDV: 7.12 [PH] (ref 7.32–7.43)
PH BLDV: 7.37 [PH] (ref 7.32–7.43)
PH BLDV: 7.42 [PH] (ref 7.32–7.43)
PLAT MORPH BLD: ABNORMAL
PLAT MORPH BLD: NORMAL
PLATELET # BLD AUTO: 103 10E3/UL (ref 150–450)
PLATELET # BLD AUTO: 56 10E3/UL (ref 150–450)
PLATELET # BLD AUTO: 72 10E3/UL (ref 150–450)
PLATELET # BLD AUTO: 77 10E3/UL (ref 150–450)
PLATELET # BLD AUTO: 78 10E3/UL (ref 150–450)
PLATELET # BLD AUTO: 79 10E3/UL (ref 150–450)
PLATELET # BLD AUTO: 79 10E3/UL (ref 150–450)
PLATELET # BLD AUTO: 85 10E3/UL (ref 150–450)
PLATELET # BLD AUTO: 92 10E3/UL (ref 150–450)
PLATELET # BLD AUTO: 94 10E3/UL (ref 150–450)
PO2 BLDV: 13 MM HG (ref 25–47)
PO2 BLDV: 25 MM HG (ref 25–47)
PO2 BLDV: 30 MM HG (ref 25–47)
POTASSIUM SERPL-SCNC: 3.8 MMOL/L (ref 3.4–5.3)
POTASSIUM SERPL-SCNC: 3.9 MMOL/L (ref 3.4–5.3)
POTASSIUM SERPL-SCNC: 4.1 MMOL/L (ref 3.4–5.3)
POTASSIUM SERPL-SCNC: 4.1 MMOL/L (ref 3.4–5.3)
POTASSIUM SERPL-SCNC: 4.3 MMOL/L (ref 3.4–5.3)
POTASSIUM SERPL-SCNC: 4.9 MMOL/L (ref 3.4–5.3)
POTASSIUM SERPL-SCNC: 5.2 MMOL/L (ref 3.4–5.3)
PROCALCITONIN SERPL IA-MCNC: 0.18 NG/ML
PROCALCITONIN SERPL IA-MCNC: 0.23 NG/ML
PROT PATTERN SERPL ELPH-IMP: ABNORMAL
PROT SERPL-MCNC: 5.1 G/DL (ref 6.4–8.3)
PSA SERPL-MCNC: 53.1 NG/ML
RBC # BLD AUTO: 3.31 10E6/UL (ref 4.4–5.9)
RBC # BLD AUTO: 3.34 10E6/UL (ref 4.4–5.9)
RBC # BLD AUTO: 3.46 10E6/UL (ref 4.4–5.9)
RBC # BLD AUTO: 3.57 10E6/UL (ref 4.4–5.9)
RBC # BLD AUTO: 3.58 10E6/UL (ref 4.4–5.9)
RBC # BLD AUTO: 3.77 10E6/UL (ref 4.4–5.9)
RBC # BLD AUTO: 3.83 10E6/UL (ref 4.4–5.9)
RBC # BLD AUTO: 3.94 10E6/UL (ref 4.4–5.9)
RBC # BLD AUTO: 4.47 10E6/UL (ref 4.4–5.9)
RBC # FLD: 8875 /UL
RBC MORPH BLD: ABNORMAL
RBC MORPH BLD: NORMAL
RETICS # AUTO: 0.22 10E6/UL (ref 0.03–0.1)
RETICS/RBC NFR AUTO: 5.6 % (ref 0.5–2)
RSV RNA SPEC NAA+PROBE: NEGATIVE
SA TARGET DNA: NEGATIVE
SAO2 % BLDV: 37 % (ref 94–100)
SARS-COV-2 RNA RESP QL NAA+PROBE: NEGATIVE
SICKLE CELLS BLD QL SMEAR: ABNORMAL
SMUDGE CELLS BLD QL SMEAR: PRESENT
SMUDGE CELLS BLD QL SMEAR: PRESENT
SODIUM SERPL-SCNC: 140 MMOL/L (ref 136–145)
SODIUM SERPL-SCNC: 141 MMOL/L (ref 136–145)
SODIUM SERPL-SCNC: 143 MMOL/L (ref 136–145)
SODIUM SERPL-SCNC: 143 MMOL/L (ref 136–145)
SODIUM SERPL-SCNC: 146 MMOL/L (ref 136–145)
SODIUM SERPL-SCNC: 147 MMOL/L (ref 136–145)
SODIUM SERPL-SCNC: 149 MMOL/L (ref 136–145)
TOTAL PROTEIN SERUM FOR ELP: 5.8 G/DL (ref 6.4–8.3)
TROPONIN T SERPL HS-MCNC: 34 NG/L
WBC # BLD AUTO: 105 10E3/UL (ref 4–11)
WBC # BLD AUTO: 31.7 10E3/UL (ref 4–11)
WBC # BLD AUTO: 31.8 10E3/UL (ref 4–11)
WBC # BLD AUTO: 41.8 10E3/UL (ref 4–11)
WBC # BLD AUTO: 43 10E3/UL (ref 4–11)
WBC # BLD AUTO: 46.1 10E3/UL (ref 4–11)
WBC # BLD AUTO: 47.8 10E3/UL (ref 4–11)
WBC # BLD AUTO: 72 10E3/UL (ref 4–11)
WBC # BLD AUTO: 73.4 10E3/UL (ref 4–11)
WBC # FLD AUTO: 508 /UL

## 2023-01-01 PROCEDURE — 250N000009 HC RX 250: Performed by: INTERNAL MEDICINE

## 2023-01-01 PROCEDURE — 999N000215 HC STATISTIC HFNC ADULT NON-CPAP

## 2023-01-01 PROCEDURE — 71045 X-RAY EXAM CHEST 1 VIEW: CPT

## 2023-01-01 PROCEDURE — 258N000003 HC RX IP 258 OP 636: Performed by: HOSPITALIST

## 2023-01-01 PROCEDURE — 85027 COMPLETE CBC AUTOMATED: CPT | Performed by: HOSPITALIST

## 2023-01-01 PROCEDURE — 83605 ASSAY OF LACTIC ACID: CPT | Performed by: HOSPITALIST

## 2023-01-01 PROCEDURE — 255N000002 HC RX 255 OP 636: Performed by: INTERNAL MEDICINE

## 2023-01-01 PROCEDURE — 71250 CT THORAX DX C-: CPT

## 2023-01-01 PROCEDURE — 36415 COLL VENOUS BLD VENIPUNCTURE: CPT | Performed by: INTERNAL MEDICINE

## 2023-01-01 PROCEDURE — 999N000157 HC STATISTIC RCP TIME EA 10 MIN

## 2023-01-01 PROCEDURE — 99233 SBSQ HOSP IP/OBS HIGH 50: CPT | Performed by: INTERNAL MEDICINE

## 2023-01-01 PROCEDURE — 87106 FUNGI IDENTIFICATION YEAST: CPT | Performed by: INTERNAL MEDICINE

## 2023-01-01 PROCEDURE — 999N000065 XR CHEST PORT 1 VIEW

## 2023-01-01 PROCEDURE — 99223 1ST HOSP IP/OBS HIGH 75: CPT | Mod: AI | Performed by: INTERNAL MEDICINE

## 2023-01-01 PROCEDURE — 250N000011 HC RX IP 250 OP 636: Performed by: INTERNAL MEDICINE

## 2023-01-01 PROCEDURE — 87210 SMEAR WET MOUNT SALINE/INK: CPT | Performed by: INTERNAL MEDICINE

## 2023-01-01 PROCEDURE — 83880 ASSAY OF NATRIURETIC PEPTIDE: CPT | Performed by: NURSE PRACTITIONER

## 2023-01-01 PROCEDURE — 85049 AUTOMATED PLATELET COUNT: CPT | Performed by: INTERNAL MEDICINE

## 2023-01-01 PROCEDURE — 99232 SBSQ HOSP IP/OBS MODERATE 35: CPT | Performed by: HOSPITALIST

## 2023-01-01 PROCEDURE — 89051 BODY FLUID CELL COUNT: CPT | Performed by: INTERNAL MEDICINE

## 2023-01-01 PROCEDURE — 250N000025 HC SEVOFLURANE, PER MIN: Performed by: INTERNAL MEDICINE

## 2023-01-01 PROCEDURE — 84153 ASSAY OF PSA TOTAL: CPT | Performed by: INTERNAL MEDICINE

## 2023-01-01 PROCEDURE — 80048 BASIC METABOLIC PNL TOTAL CA: CPT | Performed by: HOSPITALIST

## 2023-01-01 PROCEDURE — 85027 COMPLETE CBC AUTOMATED: CPT | Performed by: INTERNAL MEDICINE

## 2023-01-01 PROCEDURE — 87081 CULTURE SCREEN ONLY: CPT | Performed by: INTERNAL MEDICINE

## 2023-01-01 PROCEDURE — 96365 THER/PROPH/DIAG IV INF INIT: CPT

## 2023-01-01 PROCEDURE — 84484 ASSAY OF TROPONIN QUANT: CPT | Performed by: INTERNAL MEDICINE

## 2023-01-01 PROCEDURE — 94640 AIRWAY INHALATION TREATMENT: CPT | Mod: 76

## 2023-01-01 PROCEDURE — 36415 COLL VENOUS BLD VENIPUNCTURE: CPT | Performed by: HOSPITALIST

## 2023-01-01 PROCEDURE — 250N000011 HC RX IP 250 OP 636: Performed by: HOSPITALIST

## 2023-01-01 PROCEDURE — 93306 TTE W/DOPPLER COMPLETE: CPT | Mod: 26 | Performed by: INTERNAL MEDICINE

## 2023-01-01 PROCEDURE — 84145 PROCALCITONIN (PCT): CPT | Performed by: HOSPITALIST

## 2023-01-01 PROCEDURE — 87581 M.PNEUMON DNA AMP PROBE: CPT | Performed by: INTERNAL MEDICINE

## 2023-01-01 PROCEDURE — 85007 BL SMEAR W/DIFF WBC COUNT: CPT | Performed by: HOSPITALIST

## 2023-01-01 PROCEDURE — 250N000011 HC RX IP 250 OP 636: Performed by: NURSE PRACTITIONER

## 2023-01-01 PROCEDURE — 87305 ASPERGILLUS AG IA: CPT | Performed by: INTERNAL MEDICINE

## 2023-01-01 PROCEDURE — 3E033XZ INTRODUCTION OF VASOPRESSOR INTO PERIPHERAL VEIN, PERCUTANEOUS APPROACH: ICD-10-PCS | Performed by: INTERNAL MEDICINE

## 2023-01-01 PROCEDURE — 85007 BL SMEAR W/DIFF WBC COUNT: CPT | Performed by: INTERNAL MEDICINE

## 2023-01-01 PROCEDURE — 82784 ASSAY IGA/IGD/IGG/IGM EACH: CPT | Performed by: INTERNAL MEDICINE

## 2023-01-01 PROCEDURE — 82565 ASSAY OF CREATININE: CPT | Performed by: HOSPITALIST

## 2023-01-01 PROCEDURE — 84145 PROCALCITONIN (PCT): CPT | Performed by: INTERNAL MEDICINE

## 2023-01-01 PROCEDURE — 87040 BLOOD CULTURE FOR BACTERIA: CPT | Performed by: NURSE PRACTITIONER

## 2023-01-01 PROCEDURE — 83605 ASSAY OF LACTIC ACID: CPT | Performed by: INTERNAL MEDICINE

## 2023-01-01 PROCEDURE — 83605 ASSAY OF LACTIC ACID: CPT | Performed by: NURSE PRACTITIONER

## 2023-01-01 PROCEDURE — 250N000013 HC RX MED GY IP 250 OP 250 PS 637: Performed by: HOSPITALIST

## 2023-01-01 PROCEDURE — 250N000011 HC RX IP 250 OP 636

## 2023-01-01 PROCEDURE — 36415 COLL VENOUS BLD VENIPUNCTURE: CPT | Performed by: EMERGENCY MEDICINE

## 2023-01-01 PROCEDURE — 258N000003 HC RX IP 258 OP 636: Performed by: INTERNAL MEDICINE

## 2023-01-01 PROCEDURE — 250N000009 HC RX 250

## 2023-01-01 PROCEDURE — 120N000001 HC R&B MED SURG/OB

## 2023-01-01 PROCEDURE — 258N000003 HC RX IP 258 OP 636: Performed by: NURSE PRACTITIONER

## 2023-01-01 PROCEDURE — 87205 SMEAR GRAM STAIN: CPT | Performed by: INTERNAL MEDICINE

## 2023-01-01 PROCEDURE — 94640 AIRWAY INHALATION TREATMENT: CPT

## 2023-01-01 PROCEDURE — 0B9J8ZX DRAINAGE OF LEFT LOWER LUNG LOBE, VIA NATURAL OR ARTIFICIAL OPENING ENDOSCOPIC, DIAGNOSTIC: ICD-10-PCS | Performed by: INTERNAL MEDICINE

## 2023-01-01 PROCEDURE — 999N000208 ECHOCARDIOGRAM COMPLETE

## 2023-01-01 PROCEDURE — 36415 COLL VENOUS BLD VENIPUNCTURE: CPT | Performed by: NURSE PRACTITIONER

## 2023-01-01 PROCEDURE — 82565 ASSAY OF CREATININE: CPT | Performed by: INTERNAL MEDICINE

## 2023-01-01 PROCEDURE — 83735 ASSAY OF MAGNESIUM: CPT | Performed by: HOSPITALIST

## 2023-01-01 PROCEDURE — 84155 ASSAY OF PROTEIN SERUM: CPT | Performed by: INTERNAL MEDICINE

## 2023-01-01 PROCEDURE — G0463 HOSPITAL OUTPT CLINIC VISIT: HCPCS

## 2023-01-01 PROCEDURE — 5A1935Z RESPIRATORY VENTILATION, LESS THAN 24 CONSECUTIVE HOURS: ICD-10-PCS | Performed by: INTERNAL MEDICINE

## 2023-01-01 PROCEDURE — 85045 AUTOMATED RETICULOCYTE COUNT: CPT | Performed by: INTERNAL MEDICINE

## 2023-01-01 PROCEDURE — 999N000156 HC STATISTIC RCP CONSULT EA 30 MIN

## 2023-01-01 PROCEDURE — 80048 BASIC METABOLIC PNL TOTAL CA: CPT | Performed by: INTERNAL MEDICINE

## 2023-01-01 PROCEDURE — 250N000011 HC RX IP 250 OP 636: Performed by: EMERGENCY MEDICINE

## 2023-01-01 PROCEDURE — 2894A VOIDCORRECT: CPT | Performed by: INTERNAL MEDICINE

## 2023-01-01 PROCEDURE — 87102 FUNGUS ISOLATION CULTURE: CPT | Performed by: INTERNAL MEDICINE

## 2023-01-01 PROCEDURE — 83735 ASSAY OF MAGNESIUM: CPT | Performed by: INTERNAL MEDICINE

## 2023-01-01 PROCEDURE — 85007 BL SMEAR W/DIFF WBC COUNT: CPT | Performed by: EMERGENCY MEDICINE

## 2023-01-01 PROCEDURE — 85060 BLOOD SMEAR INTERPRETATION: CPT | Performed by: PATHOLOGY

## 2023-01-01 PROCEDURE — 82310 ASSAY OF CALCIUM: CPT | Performed by: HOSPITALIST

## 2023-01-01 PROCEDURE — 93010 ELECTROCARDIOGRAM REPORT: CPT | Performed by: INTERNAL MEDICINE

## 2023-01-01 PROCEDURE — 85610 PROTHROMBIN TIME: CPT | Performed by: INTERNAL MEDICINE

## 2023-01-01 PROCEDURE — 74177 CT ABD & PELVIS W/CONTRAST: CPT

## 2023-01-01 PROCEDURE — 85027 COMPLETE CBC AUTOMATED: CPT | Performed by: NURSE PRACTITIONER

## 2023-01-01 PROCEDURE — 85007 BL SMEAR W/DIFF WBC COUNT: CPT | Performed by: NURSE PRACTITIONER

## 2023-01-01 PROCEDURE — 87206 SMEAR FLUORESCENT/ACID STAI: CPT | Performed by: INTERNAL MEDICINE

## 2023-01-01 PROCEDURE — 99285 EMERGENCY DEPT VISIT HI MDM: CPT | Mod: CS,25

## 2023-01-01 PROCEDURE — 99233 SBSQ HOSP IP/OBS HIGH 50: CPT | Performed by: PHYSICIAN ASSISTANT

## 2023-01-01 PROCEDURE — 258N000003 HC RX IP 258 OP 636: Performed by: NURSE ANESTHETIST, CERTIFIED REGISTERED

## 2023-01-01 PROCEDURE — 258N000002 HC RX IP 258 OP 250: Performed by: HOSPITALIST

## 2023-01-01 PROCEDURE — 360N000076 HC SURGERY LEVEL 3, PER MIN: Performed by: INTERNAL MEDICINE

## 2023-01-01 PROCEDURE — 250N000011 HC RX IP 250 OP 636: Performed by: NURSE ANESTHETIST, CERTIFIED REGISTERED

## 2023-01-01 PROCEDURE — 87116 MYCOBACTERIA CULTURE: CPT | Performed by: INTERNAL MEDICINE

## 2023-01-01 PROCEDURE — 5A0935A ASSISTANCE WITH RESPIRATORY VENTILATION, LESS THAN 24 CONSECUTIVE HOURS, HIGH NASAL FLOW/VELOCITY: ICD-10-PCS | Performed by: INTERNAL MEDICINE

## 2023-01-01 PROCEDURE — 99233 SBSQ HOSP IP/OBS HIGH 50: CPT | Performed by: HOSPITALIST

## 2023-01-01 PROCEDURE — 83605 ASSAY OF LACTIC ACID: CPT

## 2023-01-01 PROCEDURE — 94002 VENT MGMT INPAT INIT DAY: CPT

## 2023-01-01 PROCEDURE — 94660 CPAP INITIATION&MGMT: CPT

## 2023-01-01 PROCEDURE — 85027 COMPLETE CBC AUTOMATED: CPT | Performed by: EMERGENCY MEDICINE

## 2023-01-01 PROCEDURE — 87637 SARSCOV2&INF A&B&RSV AMP PRB: CPT | Performed by: EMERGENCY MEDICINE

## 2023-01-01 PROCEDURE — 99223 1ST HOSP IP/OBS HIGH 75: CPT | Performed by: INTERNAL MEDICINE

## 2023-01-01 PROCEDURE — 272N000054 HC CANNULA HIGH FLOW, ADULT

## 2023-01-01 PROCEDURE — 99207 PR NO CHARGE LOS: CPT | Performed by: INTERNAL MEDICINE

## 2023-01-01 PROCEDURE — 83880 ASSAY OF NATRIURETIC PEPTIDE: CPT | Performed by: INTERNAL MEDICINE

## 2023-01-01 PROCEDURE — 85730 THROMBOPLASTIN TIME PARTIAL: CPT | Performed by: INTERNAL MEDICINE

## 2023-01-01 PROCEDURE — 87641 MR-STAPH DNA AMP PROBE: CPT | Performed by: INTERNAL MEDICINE

## 2023-01-01 PROCEDURE — 82803 BLOOD GASES ANY COMBINATION: CPT | Performed by: EMERGENCY MEDICINE

## 2023-01-01 PROCEDURE — 250N000012 HC RX MED GY IP 250 OP 636 PS 637: Performed by: HOSPITALIST

## 2023-01-01 PROCEDURE — 84165 PROTEIN E-PHORESIS SERUM: CPT | Mod: TC | Performed by: PATHOLOGY

## 2023-01-01 PROCEDURE — 370N000017 HC ANESTHESIA TECHNICAL FEE, PER MIN: Performed by: INTERNAL MEDICINE

## 2023-01-01 PROCEDURE — 999N000141 HC STATISTIC PRE-PROCEDURE NURSING ASSESSMENT: Performed by: INTERNAL MEDICINE

## 2023-01-01 PROCEDURE — 31624 DX BRONCHOSCOPE/LAVAGE: CPT | Performed by: INTERNAL MEDICINE

## 2023-01-01 PROCEDURE — 250N000009 HC RX 250: Performed by: NURSE ANESTHETIST, CERTIFIED REGISTERED

## 2023-01-01 PROCEDURE — 82803 BLOOD GASES ANY COMBINATION: CPT

## 2023-01-01 PROCEDURE — 250N000009 HC RX 250: Performed by: EMERGENCY MEDICINE

## 2023-01-01 PROCEDURE — 85049 AUTOMATED PLATELET COUNT: CPT | Performed by: HOSPITALIST

## 2023-01-01 PROCEDURE — 99222 1ST HOSP IP/OBS MODERATE 55: CPT | Performed by: INTERNAL MEDICINE

## 2023-01-01 PROCEDURE — 82803 BLOOD GASES ANY COMBINATION: CPT | Performed by: HOSPITALIST

## 2023-01-01 PROCEDURE — 96375 TX/PRO/DX INJ NEW DRUG ADDON: CPT

## 2023-01-01 PROCEDURE — C9803 HOPD COVID-19 SPEC COLLECT: HCPCS

## 2023-01-01 PROCEDURE — 84165 PROTEIN E-PHORESIS SERUM: CPT | Mod: 26

## 2023-01-01 PROCEDURE — 80048 BASIC METABOLIC PNL TOTAL CA: CPT | Performed by: EMERGENCY MEDICINE

## 2023-01-01 RX ORDER — IPRATROPIUM BROMIDE AND ALBUTEROL SULFATE 2.5; .5 MG/3ML; MG/3ML
3 SOLUTION RESPIRATORY (INHALATION)
Status: DISCONTINUED | OUTPATIENT
Start: 2023-01-01 | End: 2023-01-01

## 2023-01-01 RX ORDER — METHYLPREDNISOLONE SODIUM SUCCINATE 40 MG/ML
40 INJECTION, POWDER, LYOPHILIZED, FOR SOLUTION INTRAMUSCULAR; INTRAVENOUS EVERY 6 HOURS
Status: DISCONTINUED | OUTPATIENT
Start: 2023-01-01 | End: 2023-01-01

## 2023-01-01 RX ORDER — FUROSEMIDE 10 MG/ML
40 INJECTION INTRAMUSCULAR; INTRAVENOUS ONCE
Status: DISCONTINUED | OUTPATIENT
Start: 2023-01-01 | End: 2023-01-01

## 2023-01-01 RX ORDER — METOPROLOL TARTRATE 1 MG/ML
1-2 INJECTION, SOLUTION INTRAVENOUS EVERY 5 MIN PRN
Status: DISCONTINUED | OUTPATIENT
Start: 2023-01-01 | End: 2023-01-01 | Stop reason: HOSPADM

## 2023-01-01 RX ORDER — NALOXONE HYDROCHLORIDE 0.4 MG/ML
0.4 INJECTION, SOLUTION INTRAMUSCULAR; INTRAVENOUS; SUBCUTANEOUS
Status: DISCONTINUED | OUTPATIENT
Start: 2023-01-01 | End: 2023-01-01 | Stop reason: HOSPADM

## 2023-01-01 RX ORDER — MAGNESIUM HYDROXIDE 1200 MG/15ML
LIQUID ORAL PRN
Status: DISCONTINUED | OUTPATIENT
Start: 2023-01-01 | End: 2023-01-01 | Stop reason: HOSPADM

## 2023-01-01 RX ORDER — FUROSEMIDE 10 MG/ML
20 INJECTION INTRAMUSCULAR; INTRAVENOUS EVERY 12 HOURS
Status: DISCONTINUED | OUTPATIENT
Start: 2023-01-01 | End: 2023-01-01

## 2023-01-01 RX ORDER — METHYLPREDNISOLONE SODIUM SUCCINATE 40 MG/ML
40 INJECTION, POWDER, LYOPHILIZED, FOR SOLUTION INTRAMUSCULAR; INTRAVENOUS EVERY 12 HOURS
Status: DISCONTINUED | OUTPATIENT
Start: 2023-01-01 | End: 2023-01-01

## 2023-01-01 RX ORDER — AZITHROMYCIN 500 MG/5ML
500 INJECTION, POWDER, LYOPHILIZED, FOR SOLUTION INTRAVENOUS ONCE
Status: COMPLETED | OUTPATIENT
Start: 2023-01-01 | End: 2023-01-01

## 2023-01-01 RX ORDER — CEFTRIAXONE 2 G/1
2 INJECTION, POWDER, FOR SOLUTION INTRAMUSCULAR; INTRAVENOUS EVERY 24 HOURS
Status: DISCONTINUED | OUTPATIENT
Start: 2023-01-01 | End: 2023-01-01

## 2023-01-01 RX ORDER — METHYLPREDNISOLONE SODIUM SUCCINATE 125 MG/2ML
100 INJECTION, POWDER, LYOPHILIZED, FOR SOLUTION INTRAMUSCULAR; INTRAVENOUS ONCE
Status: COMPLETED | OUTPATIENT
Start: 2023-01-01 | End: 2023-01-01

## 2023-01-01 RX ORDER — HYDRALAZINE HYDROCHLORIDE 20 MG/ML
2.5-5 INJECTION INTRAMUSCULAR; INTRAVENOUS EVERY 10 MIN PRN
Status: DISCONTINUED | OUTPATIENT
Start: 2023-01-01 | End: 2023-01-01 | Stop reason: HOSPADM

## 2023-01-01 RX ORDER — ONDANSETRON 2 MG/ML
4 INJECTION INTRAMUSCULAR; INTRAVENOUS EVERY 30 MIN PRN
Status: DISCONTINUED | OUTPATIENT
Start: 2023-01-01 | End: 2023-01-01 | Stop reason: HOSPADM

## 2023-01-01 RX ORDER — SODIUM CHLORIDE, SODIUM LACTATE, POTASSIUM CHLORIDE, CALCIUM CHLORIDE 600; 310; 30; 20 MG/100ML; MG/100ML; MG/100ML; MG/100ML
INJECTION, SOLUTION INTRAVENOUS CONTINUOUS
Status: ACTIVE | OUTPATIENT
Start: 2023-01-01 | End: 2023-01-01

## 2023-01-01 RX ORDER — ROPIVACAINE IN 0.9% SOD CHL/PF 0.1 %
.03-.125 PLASTIC BAG, INJECTION (ML) EPIDURAL CONTINUOUS
Status: DISCONTINUED | OUTPATIENT
Start: 2023-01-01 | End: 2023-01-01 | Stop reason: HOSPADM

## 2023-01-01 RX ORDER — SODIUM CHLORIDE, SODIUM LACTATE, POTASSIUM CHLORIDE, CALCIUM CHLORIDE 600; 310; 30; 20 MG/100ML; MG/100ML; MG/100ML; MG/100ML
INJECTION, SOLUTION INTRAVENOUS CONTINUOUS
Status: DISCONTINUED | OUTPATIENT
Start: 2023-01-01 | End: 2023-01-01 | Stop reason: HOSPADM

## 2023-01-01 RX ORDER — IPRATROPIUM BROMIDE AND ALBUTEROL SULFATE 2.5; .5 MG/3ML; MG/3ML
3 SOLUTION RESPIRATORY (INHALATION) EVERY 4 HOURS PRN
Status: DISCONTINUED | OUTPATIENT
Start: 2023-01-01 | End: 2023-01-01 | Stop reason: HOSPADM

## 2023-01-01 RX ORDER — MIDODRINE HYDROCHLORIDE 5 MG/1
5 TABLET ORAL
Status: DISCONTINUED | OUTPATIENT
Start: 2023-01-01 | End: 2023-01-01

## 2023-01-01 RX ORDER — ONDANSETRON 4 MG/1
4 TABLET, ORALLY DISINTEGRATING ORAL EVERY 30 MIN PRN
Status: DISCONTINUED | OUTPATIENT
Start: 2023-01-01 | End: 2023-01-01 | Stop reason: HOSPADM

## 2023-01-01 RX ORDER — EPHEDRINE SULFATE 50 MG/ML
INJECTION, SOLUTION INTRAMUSCULAR; INTRAVENOUS; SUBCUTANEOUS PRN
Status: DISCONTINUED | OUTPATIENT
Start: 2023-01-01 | End: 2023-01-01

## 2023-01-01 RX ORDER — PREDNISONE 20 MG/1
40 TABLET ORAL DAILY
Status: DISCONTINUED | OUTPATIENT
Start: 2023-01-01 | End: 2023-01-01

## 2023-01-01 RX ORDER — SODIUM CHLORIDE 450 MG/100ML
INJECTION, SOLUTION INTRAVENOUS CONTINUOUS
Status: ACTIVE | OUTPATIENT
Start: 2023-01-01 | End: 2023-01-01

## 2023-01-01 RX ORDER — EPINEPHRINE IN 0.9 % SOD CHLOR 5 MG/250ML
.01-.3 PLASTIC BAG, INJECTION (ML) INTRAVENOUS CONTINUOUS
Status: DISCONTINUED | OUTPATIENT
Start: 2023-01-01 | End: 2023-01-01 | Stop reason: HOSPADM

## 2023-01-01 RX ORDER — DEXAMETHASONE SODIUM PHOSPHATE 4 MG/ML
INJECTION, SOLUTION INTRA-ARTICULAR; INTRALESIONAL; INTRAMUSCULAR; INTRAVENOUS; SOFT TISSUE PRN
Status: DISCONTINUED | OUTPATIENT
Start: 2023-01-01 | End: 2023-01-01

## 2023-01-01 RX ORDER — LIDOCAINE 40 MG/G
CREAM TOPICAL
Status: DISCONTINUED | OUTPATIENT
Start: 2023-01-01 | End: 2023-01-01 | Stop reason: HOSPADM

## 2023-01-01 RX ORDER — CEFTRIAXONE 1 G/1
1 INJECTION, POWDER, FOR SOLUTION INTRAMUSCULAR; INTRAVENOUS ONCE
Status: COMPLETED | OUTPATIENT
Start: 2023-01-01 | End: 2023-01-01

## 2023-01-01 RX ORDER — MIDODRINE HYDROCHLORIDE 5 MG/1
10 TABLET ORAL
Status: DISCONTINUED | OUTPATIENT
Start: 2023-01-01 | End: 2023-01-01

## 2023-01-01 RX ORDER — ONDANSETRON 4 MG/1
4 TABLET, ORALLY DISINTEGRATING ORAL EVERY 6 HOURS PRN
Status: DISCONTINUED | OUTPATIENT
Start: 2023-01-01 | End: 2023-01-01 | Stop reason: HOSPADM

## 2023-01-01 RX ORDER — AZITHROMYCIN 500 MG/5ML
500 INJECTION, POWDER, LYOPHILIZED, FOR SOLUTION INTRAVENOUS EVERY 24 HOURS
Status: DISCONTINUED | OUTPATIENT
Start: 2023-01-01 | End: 2023-01-01

## 2023-01-01 RX ORDER — FENTANYL CITRATE 50 UG/ML
INJECTION, SOLUTION INTRAMUSCULAR; INTRAVENOUS PRN
Status: DISCONTINUED | OUTPATIENT
Start: 2023-01-01 | End: 2023-01-01

## 2023-01-01 RX ORDER — ONDANSETRON 2 MG/ML
4 INJECTION INTRAMUSCULAR; INTRAVENOUS EVERY 6 HOURS PRN
Status: DISCONTINUED | OUTPATIENT
Start: 2023-01-01 | End: 2023-01-01 | Stop reason: HOSPADM

## 2023-01-01 RX ORDER — SODIUM CHLORIDE, SODIUM LACTATE, POTASSIUM CHLORIDE, CALCIUM CHLORIDE 600; 310; 30; 20 MG/100ML; MG/100ML; MG/100ML; MG/100ML
INJECTION, SOLUTION INTRAVENOUS CONTINUOUS
Status: DISCONTINUED | OUTPATIENT
Start: 2023-01-01 | End: 2023-01-01

## 2023-01-01 RX ORDER — VASOPRESSIN IN 0.9 % NACL 2 UNIT/2ML
SYRINGE (ML) INTRAVENOUS PRN
Status: DISCONTINUED | OUTPATIENT
Start: 2023-01-01 | End: 2023-01-01

## 2023-01-01 RX ORDER — FENTANYL CITRATE 50 UG/ML
50 INJECTION, SOLUTION INTRAMUSCULAR; INTRAVENOUS
Status: DISCONTINUED | OUTPATIENT
Start: 2023-01-01 | End: 2023-01-01 | Stop reason: HOSPADM

## 2023-01-01 RX ORDER — TAMSULOSIN HYDROCHLORIDE 0.4 MG/1
0.4 CAPSULE ORAL DAILY
Status: DISCONTINUED | OUTPATIENT
Start: 2023-01-01 | End: 2023-01-01 | Stop reason: HOSPADM

## 2023-01-01 RX ORDER — ALBUTEROL SULFATE 0.83 MG/ML
2.5 SOLUTION RESPIRATORY (INHALATION)
Status: DISCONTINUED | OUTPATIENT
Start: 2023-01-01 | End: 2023-01-01 | Stop reason: HOSPADM

## 2023-01-01 RX ORDER — OXYCODONE HYDROCHLORIDE 5 MG/1
5 TABLET ORAL EVERY 4 HOURS PRN
Status: DISCONTINUED | OUTPATIENT
Start: 2023-01-01 | End: 2023-01-01 | Stop reason: HOSPADM

## 2023-01-01 RX ORDER — ENOXAPARIN SODIUM 100 MG/ML
40 INJECTION SUBCUTANEOUS EVERY 24 HOURS
Status: DISCONTINUED | OUTPATIENT
Start: 2023-01-01 | End: 2023-01-01

## 2023-01-01 RX ORDER — ACETAMINOPHEN 650 MG/1
650 SUPPOSITORY RECTAL EVERY 6 HOURS PRN
Status: DISCONTINUED | OUTPATIENT
Start: 2023-01-01 | End: 2023-01-01 | Stop reason: HOSPADM

## 2023-01-01 RX ORDER — PROPOFOL 10 MG/ML
INJECTION, EMULSION INTRAVENOUS PRN
Status: DISCONTINUED | OUTPATIENT
Start: 2023-01-01 | End: 2023-01-01

## 2023-01-01 RX ORDER — ACETAMINOPHEN 325 MG/1
650 TABLET ORAL EVERY 6 HOURS PRN
Status: DISCONTINUED | OUTPATIENT
Start: 2023-01-01 | End: 2023-01-01 | Stop reason: HOSPADM

## 2023-01-01 RX ORDER — NOREPINEPHRINE BITARTRATE 0.02 MG/ML
INJECTION, SOLUTION INTRAVENOUS
Status: COMPLETED
Start: 2023-01-01 | End: 2023-01-01

## 2023-01-01 RX ORDER — IPRATROPIUM BROMIDE AND ALBUTEROL SULFATE 2.5; .5 MG/3ML; MG/3ML
3 SOLUTION RESPIRATORY (INHALATION) ONCE
Status: COMPLETED | OUTPATIENT
Start: 2023-01-01 | End: 2023-01-01

## 2023-01-01 RX ORDER — DEXMEDETOMIDINE HYDROCHLORIDE 4 UG/ML
.1-1.2 INJECTION, SOLUTION INTRAVENOUS CONTINUOUS
Status: DISCONTINUED | OUTPATIENT
Start: 2023-01-01 | End: 2023-01-01 | Stop reason: HOSPADM

## 2023-01-01 RX ORDER — VANCOMYCIN HYDROCHLORIDE 1 G/200ML
1000 INJECTION, SOLUTION INTRAVENOUS EVERY 24 HOURS
Status: DISCONTINUED | OUTPATIENT
Start: 2023-01-01 | End: 2023-01-01

## 2023-01-01 RX ORDER — ALBUTEROL SULFATE 90 UG/1
6 AEROSOL, METERED RESPIRATORY (INHALATION) EVERY 4 HOURS
Status: DISCONTINUED | OUTPATIENT
Start: 2023-01-01 | End: 2023-01-01 | Stop reason: HOSPADM

## 2023-01-01 RX ORDER — NALOXONE HYDROCHLORIDE 0.4 MG/ML
0.2 INJECTION, SOLUTION INTRAMUSCULAR; INTRAVENOUS; SUBCUTANEOUS
Status: DISCONTINUED | OUTPATIENT
Start: 2023-01-01 | End: 2023-01-01 | Stop reason: HOSPADM

## 2023-01-01 RX ORDER — HYDROCHLOROTHIAZIDE 12.5 MG/1
12.5 CAPSULE ORAL DAILY
Status: DISCONTINUED | OUTPATIENT
Start: 2023-01-01 | End: 2023-01-01

## 2023-01-01 RX ORDER — LIDOCAINE HYDROCHLORIDE 10 MG/ML
INJECTION, SOLUTION INFILTRATION; PERINEURAL PRN
Status: DISCONTINUED | OUTPATIENT
Start: 2023-01-01 | End: 2023-01-01

## 2023-01-01 RX ORDER — OXYCODONE HYDROCHLORIDE 5 MG/1
10 TABLET ORAL EVERY 4 HOURS PRN
Status: DISCONTINUED | OUTPATIENT
Start: 2023-01-01 | End: 2023-01-01 | Stop reason: HOSPADM

## 2023-01-01 RX ORDER — FENTANYL CITRATE 50 UG/ML
25 INJECTION, SOLUTION INTRAMUSCULAR; INTRAVENOUS EVERY 5 MIN PRN
Status: DISCONTINUED | OUTPATIENT
Start: 2023-01-01 | End: 2023-01-01 | Stop reason: HOSPADM

## 2023-01-01 RX ORDER — CHLORHEXIDINE GLUCONATE ORAL RINSE 1.2 MG/ML
15 SOLUTION DENTAL EVERY 12 HOURS
Status: DISCONTINUED | OUTPATIENT
Start: 2023-01-01 | End: 2023-01-01 | Stop reason: HOSPADM

## 2023-01-01 RX ORDER — HYDROMORPHONE HCL IN WATER/PF 6 MG/30 ML
0.2 PATIENT CONTROLLED ANALGESIA SYRINGE INTRAVENOUS EVERY 5 MIN PRN
Status: DISCONTINUED | OUTPATIENT
Start: 2023-01-01 | End: 2023-01-01 | Stop reason: HOSPADM

## 2023-01-01 RX ORDER — TAMSULOSIN HYDROCHLORIDE 0.4 MG/1
0.4 CAPSULE ORAL DAILY
COMMUNITY
Start: 2022-01-01

## 2023-01-01 RX ORDER — ACETAMINOPHEN 325 MG/1
975 TABLET ORAL ONCE
Status: DISCONTINUED | OUTPATIENT
Start: 2023-01-01 | End: 2023-01-01 | Stop reason: HOSPADM

## 2023-01-01 RX ORDER — IOPAMIDOL 755 MG/ML
500 INJECTION, SOLUTION INTRAVASCULAR ONCE
Status: COMPLETED | OUTPATIENT
Start: 2023-01-01 | End: 2023-01-01

## 2023-01-01 RX ORDER — HYDROMORPHONE HCL IN WATER/PF 6 MG/30 ML
0.4 PATIENT CONTROLLED ANALGESIA SYRINGE INTRAVENOUS EVERY 5 MIN PRN
Status: DISCONTINUED | OUTPATIENT
Start: 2023-01-01 | End: 2023-01-01 | Stop reason: HOSPADM

## 2023-01-01 RX ORDER — FENTANYL CITRATE 50 UG/ML
50 INJECTION, SOLUTION INTRAMUSCULAR; INTRAVENOUS EVERY 5 MIN PRN
Status: DISCONTINUED | OUTPATIENT
Start: 2023-01-01 | End: 2023-01-01 | Stop reason: HOSPADM

## 2023-01-01 RX ORDER — SODIUM CHLORIDE 9 MG/ML
INJECTION, SOLUTION INTRAVENOUS CONTINUOUS
Status: DISCONTINUED | OUTPATIENT
Start: 2023-01-01 | End: 2023-01-01

## 2023-01-01 RX ADMIN — PHENYLEPHRINE HYDROCHLORIDE 200 MCG: 10 INJECTION INTRAVENOUS at 13:59

## 2023-01-01 RX ADMIN — PHENYLEPHRINE HYDROCHLORIDE 300 MCG: 10 INJECTION INTRAVENOUS at 13:54

## 2023-01-01 RX ADMIN — METHYLPREDNISOLONE SODIUM SUCCINATE 100 MG: 125 INJECTION, POWDER, FOR SOLUTION INTRAMUSCULAR; INTRAVENOUS at 13:19

## 2023-01-01 RX ADMIN — METHYLPREDNISOLONE SODIUM SUCCINATE 40 MG: 40 INJECTION, POWDER, FOR SOLUTION INTRAMUSCULAR; INTRAVENOUS at 18:05

## 2023-01-01 RX ADMIN — SODIUM CHLORIDE 500 ML: 9 INJECTION, SOLUTION INTRAVENOUS at 00:57

## 2023-01-01 RX ADMIN — IPRATROPIUM BROMIDE AND ALBUTEROL SULFATE 3 ML: 2.5; .5 SOLUTION RESPIRATORY (INHALATION) at 15:36

## 2023-01-01 RX ADMIN — IPRATROPIUM BROMIDE AND ALBUTEROL SULFATE 3 ML: 2.5; .5 SOLUTION RESPIRATORY (INHALATION) at 07:39

## 2023-01-01 RX ADMIN — VANCOMYCIN HYDROCHLORIDE 1500 MG: 10 INJECTION, POWDER, LYOPHILIZED, FOR SOLUTION INTRAVENOUS at 12:14

## 2023-01-01 RX ADMIN — TAZOBACTAM SODIUM AND PIPERACILLIN SODIUM 4.5 G: 500; 4 INJECTION, SOLUTION INTRAVENOUS at 08:49

## 2023-01-01 RX ADMIN — TAZOBACTAM SODIUM AND PIPERACILLIN SODIUM 4.5 G: 500; 4 INJECTION, SOLUTION INTRAVENOUS at 21:15

## 2023-01-01 RX ADMIN — PROPOFOL 150 MG: 10 INJECTION, EMULSION INTRAVENOUS at 13:40

## 2023-01-01 RX ADMIN — TAZOBACTAM SODIUM AND PIPERACILLIN SODIUM 4.5 G: 500; 4 INJECTION, SOLUTION INTRAVENOUS at 13:58

## 2023-01-01 RX ADMIN — PHENYLEPHRINE HYDROCHLORIDE 100 MCG: 10 INJECTION INTRAVENOUS at 10:38

## 2023-01-01 RX ADMIN — CEFTRIAXONE 1 G: 1 INJECTION, POWDER, FOR SOLUTION INTRAMUSCULAR; INTRAVENOUS at 16:01

## 2023-01-01 RX ADMIN — MIDODRINE HYDROCHLORIDE 10 MG: 5 TABLET ORAL at 18:21

## 2023-01-01 RX ADMIN — TAMSULOSIN HYDROCHLORIDE 0.4 MG: 0.4 CAPSULE ORAL at 08:03

## 2023-01-01 RX ADMIN — IPRATROPIUM BROMIDE AND ALBUTEROL SULFATE 3 ML: 2.5; .5 SOLUTION RESPIRATORY (INHALATION) at 19:33

## 2023-01-01 RX ADMIN — METHYLPREDNISOLONE SODIUM SUCCINATE 40 MG: 40 INJECTION, POWDER, FOR SOLUTION INTRAMUSCULAR; INTRAVENOUS at 20:37

## 2023-01-01 RX ADMIN — IPRATROPIUM BROMIDE AND ALBUTEROL SULFATE 3 ML: 2.5; .5 SOLUTION RESPIRATORY (INHALATION) at 19:25

## 2023-01-01 RX ADMIN — IPRATROPIUM BROMIDE AND ALBUTEROL SULFATE 3 ML: 2.5; .5 SOLUTION RESPIRATORY (INHALATION) at 16:24

## 2023-01-01 RX ADMIN — METHYLPREDNISOLONE SODIUM SUCCINATE 40 MG: 40 INJECTION, POWDER, FOR SOLUTION INTRAMUSCULAR; INTRAVENOUS at 00:35

## 2023-01-01 RX ADMIN — PREDNISONE 40 MG: 20 TABLET ORAL at 08:37

## 2023-01-01 RX ADMIN — PHENYLEPHRINE HYDROCHLORIDE 200 MCG: 10 INJECTION INTRAVENOUS at 14:06

## 2023-01-01 RX ADMIN — MIDODRINE HYDROCHLORIDE 10 MG: 5 TABLET ORAL at 08:03

## 2023-01-01 RX ADMIN — METHYLPREDNISOLONE SODIUM SUCCINATE 40 MG: 40 INJECTION, POWDER, FOR SOLUTION INTRAMUSCULAR; INTRAVENOUS at 06:37

## 2023-01-01 RX ADMIN — SODIUM CHLORIDE, POTASSIUM CHLORIDE, SODIUM LACTATE AND CALCIUM CHLORIDE: 600; 310; 30; 20 INJECTION, SOLUTION INTRAVENOUS at 19:09

## 2023-01-01 RX ADMIN — AZITHROMYCIN MONOHYDRATE 500 MG: 500 INJECTION, POWDER, LYOPHILIZED, FOR SOLUTION INTRAVENOUS at 16:39

## 2023-01-01 RX ADMIN — IPRATROPIUM BROMIDE AND ALBUTEROL SULFATE 3 ML: 2.5; .5 SOLUTION RESPIRATORY (INHALATION) at 20:12

## 2023-01-01 RX ADMIN — MIDODRINE HYDROCHLORIDE 10 MG: 5 TABLET ORAL at 08:37

## 2023-01-01 RX ADMIN — TAZOBACTAM SODIUM AND PIPERACILLIN SODIUM 4.5 G: 500; 4 INJECTION, SOLUTION INTRAVENOUS at 09:23

## 2023-01-01 RX ADMIN — IPRATROPIUM BROMIDE AND ALBUTEROL SULFATE 3 ML: 2.5; .5 SOLUTION RESPIRATORY (INHALATION) at 11:39

## 2023-01-01 RX ADMIN — SODIUM CHLORIDE 63 ML: 9 INJECTION, SOLUTION INTRAVENOUS at 18:08

## 2023-01-01 RX ADMIN — Medication 0.4 MCG/KG/MIN: at 14:48

## 2023-01-01 RX ADMIN — TAZOBACTAM SODIUM AND PIPERACILLIN SODIUM 4.5 G: 500; 4 INJECTION, SOLUTION INTRAVENOUS at 20:41

## 2023-01-01 RX ADMIN — ENOXAPARIN SODIUM 40 MG: 40 INJECTION SUBCUTANEOUS at 17:11

## 2023-01-01 RX ADMIN — IPRATROPIUM BROMIDE AND ALBUTEROL SULFATE 3 ML: 2.5; .5 SOLUTION RESPIRATORY (INHALATION) at 19:35

## 2023-01-01 RX ADMIN — SODIUM CHLORIDE: 4.5 INJECTION, SOLUTION INTRAVENOUS at 10:55

## 2023-01-01 RX ADMIN — TAMSULOSIN HYDROCHLORIDE 0.4 MG: 0.4 CAPSULE ORAL at 09:48

## 2023-01-01 RX ADMIN — DEXAMETHASONE SODIUM PHOSPHATE 4 MG: 4 INJECTION, SOLUTION INTRA-ARTICULAR; INTRALESIONAL; INTRAMUSCULAR; INTRAVENOUS; SOFT TISSUE at 13:40

## 2023-01-01 RX ADMIN — Medication 1 UNITS: at 13:58

## 2023-01-01 RX ADMIN — MIDAZOLAM HYDROCHLORIDE 2 MG: 1 INJECTION, SOLUTION INTRAMUSCULAR; INTRAVENOUS at 15:02

## 2023-01-01 RX ADMIN — METHYLPREDNISOLONE SODIUM SUCCINATE 40 MG: 40 INJECTION, POWDER, FOR SOLUTION INTRAMUSCULAR; INTRAVENOUS at 18:37

## 2023-01-01 RX ADMIN — IPRATROPIUM BROMIDE AND ALBUTEROL SULFATE 3 ML: 2.5; .5 SOLUTION RESPIRATORY (INHALATION) at 07:33

## 2023-01-01 RX ADMIN — PHENYLEPHRINE HYDROCHLORIDE 200 MCG: 10 INJECTION INTRAVENOUS at 13:51

## 2023-01-01 RX ADMIN — Medication 2 UNITS: at 14:07

## 2023-01-01 RX ADMIN — TAMSULOSIN HYDROCHLORIDE 0.4 MG: 0.4 CAPSULE ORAL at 08:49

## 2023-01-01 RX ADMIN — TAZOBACTAM SODIUM AND PIPERACILLIN SODIUM 4.5 G: 500; 4 INJECTION, SOLUTION INTRAVENOUS at 08:43

## 2023-01-01 RX ADMIN — MIDODRINE HYDROCHLORIDE 10 MG: 5 TABLET ORAL at 13:38

## 2023-01-01 RX ADMIN — IPRATROPIUM BROMIDE AND ALBUTEROL SULFATE 3 ML: 2.5; .5 SOLUTION RESPIRATORY (INHALATION) at 13:18

## 2023-01-01 RX ADMIN — PHENYLEPHRINE HYDROCHLORIDE 200 MCG: 10 INJECTION INTRAVENOUS at 14:02

## 2023-01-01 RX ADMIN — TAZOBACTAM SODIUM AND PIPERACILLIN SODIUM 4.5 G: 500; 4 INJECTION, SOLUTION INTRAVENOUS at 20:36

## 2023-01-01 RX ADMIN — TAZOBACTAM SODIUM AND PIPERACILLIN SODIUM 4.5 G: 500; 4 INJECTION, SOLUTION INTRAVENOUS at 21:25

## 2023-01-01 RX ADMIN — SODIUM CHLORIDE: 9 INJECTION, SOLUTION INTRAVENOUS at 08:49

## 2023-01-01 RX ADMIN — CEFTRIAXONE 2 G: 2 INJECTION, POWDER, FOR SOLUTION INTRAMUSCULAR; INTRAVENOUS at 15:03

## 2023-01-01 RX ADMIN — SODIUM CHLORIDE, POTASSIUM CHLORIDE, SODIUM LACTATE AND CALCIUM CHLORIDE: 600; 310; 30; 20 INJECTION, SOLUTION INTRAVENOUS at 07:42

## 2023-01-01 RX ADMIN — PHENYLEPHRINE HYDROCHLORIDE 100 MCG: 10 INJECTION INTRAVENOUS at 14:31

## 2023-01-01 RX ADMIN — IPRATROPIUM BROMIDE AND ALBUTEROL SULFATE 3 ML: 2.5; .5 SOLUTION RESPIRATORY (INHALATION) at 16:10

## 2023-01-01 RX ADMIN — DEXMEDETOMIDINE HYDROCHLORIDE 0.2 MCG/KG/HR: 400 INJECTION INTRAVENOUS at 14:59

## 2023-01-01 RX ADMIN — TAZOBACTAM SODIUM AND PIPERACILLIN SODIUM 4.5 G: 500; 4 INJECTION, SOLUTION INTRAVENOUS at 14:44

## 2023-01-01 RX ADMIN — IPRATROPIUM BROMIDE AND ALBUTEROL SULFATE 3 ML: 2.5; .5 SOLUTION RESPIRATORY (INHALATION) at 11:38

## 2023-01-01 RX ADMIN — CEFTRIAXONE 1 G: 1 INJECTION, POWDER, FOR SOLUTION INTRAMUSCULAR; INTRAVENOUS at 14:32

## 2023-01-01 RX ADMIN — TAZOBACTAM SODIUM AND PIPERACILLIN SODIUM 4.5 G: 500; 4 INJECTION, SOLUTION INTRAVENOUS at 02:23

## 2023-01-01 RX ADMIN — MIDAZOLAM HYDROCHLORIDE 2 MG: 1 INJECTION, SOLUTION INTRAMUSCULAR; INTRAVENOUS at 14:50

## 2023-01-01 RX ADMIN — MIDODRINE HYDROCHLORIDE 10 MG: 5 TABLET ORAL at 18:05

## 2023-01-01 RX ADMIN — IPRATROPIUM BROMIDE AND ALBUTEROL SULFATE 3 ML: 2.5; .5 SOLUTION RESPIRATORY (INHALATION) at 15:18

## 2023-01-01 RX ADMIN — IPRATROPIUM BROMIDE AND ALBUTEROL SULFATE 3 ML: 2.5; .5 SOLUTION RESPIRATORY (INHALATION) at 11:22

## 2023-01-01 RX ADMIN — LIDOCAINE HYDROCHLORIDE 50 MG: 10 INJECTION, SOLUTION INFILTRATION; PERINEURAL at 13:40

## 2023-01-01 RX ADMIN — SODIUM CHLORIDE, POTASSIUM CHLORIDE, SODIUM LACTATE AND CALCIUM CHLORIDE 500 ML: 600; 310; 30; 20 INJECTION, SOLUTION INTRAVENOUS at 20:20

## 2023-01-01 RX ADMIN — MIDODRINE HYDROCHLORIDE 10 MG: 5 TABLET ORAL at 12:01

## 2023-01-01 RX ADMIN — HYDROCHLOROTHIAZIDE 12.5 MG: 12.5 CAPSULE ORAL at 09:48

## 2023-01-01 RX ADMIN — MIDODRINE HYDROCHLORIDE 5 MG: 5 TABLET ORAL at 07:42

## 2023-01-01 RX ADMIN — SODIUM CHLORIDE 500 ML: 9 INJECTION, SOLUTION INTRAVENOUS at 15:59

## 2023-01-01 RX ADMIN — TAZOBACTAM SODIUM AND PIPERACILLIN SODIUM 4.5 G: 500; 4 INJECTION, SOLUTION INTRAVENOUS at 03:49

## 2023-01-01 RX ADMIN — MIDODRINE HYDROCHLORIDE 5 MG: 5 TABLET ORAL at 17:09

## 2023-01-01 RX ADMIN — IPRATROPIUM BROMIDE AND ALBUTEROL SULFATE 3 ML: 2.5; .5 SOLUTION RESPIRATORY (INHALATION) at 07:23

## 2023-01-01 RX ADMIN — IPRATROPIUM BROMIDE AND ALBUTEROL SULFATE 3 ML: 2.5; .5 SOLUTION RESPIRATORY (INHALATION) at 20:08

## 2023-01-01 RX ADMIN — IOPAMIDOL 92 ML: 755 INJECTION, SOLUTION INTRAVENOUS at 18:08

## 2023-01-01 RX ADMIN — ENOXAPARIN SODIUM 40 MG: 40 INJECTION SUBCUTANEOUS at 17:09

## 2023-01-01 RX ADMIN — TAMSULOSIN HYDROCHLORIDE 0.4 MG: 0.4 CAPSULE ORAL at 07:42

## 2023-01-01 RX ADMIN — SODIUM CHLORIDE, POTASSIUM CHLORIDE, SODIUM LACTATE AND CALCIUM CHLORIDE: 600; 310; 30; 20 INJECTION, SOLUTION INTRAVENOUS at 22:44

## 2023-01-01 RX ADMIN — Medication 10 MG: at 14:04

## 2023-01-01 RX ADMIN — IPRATROPIUM BROMIDE AND ALBUTEROL SULFATE 3 ML: 2.5; .5 SOLUTION RESPIRATORY (INHALATION) at 16:01

## 2023-01-01 RX ADMIN — Medication 15 MG: at 14:12

## 2023-01-01 RX ADMIN — FENTANYL CITRATE 100 MCG: 50 INJECTION, SOLUTION INTRAMUSCULAR; INTRAVENOUS at 13:40

## 2023-01-01 RX ADMIN — HUMAN ALBUMIN MICROSPHERES AND PERFLUTREN 3 ML: 10; .22 INJECTION, SOLUTION INTRAVENOUS at 08:35

## 2023-01-01 RX ADMIN — METHYLPREDNISOLONE SODIUM SUCCINATE 40 MG: 40 INJECTION, POWDER, FOR SOLUTION INTRAMUSCULAR; INTRAVENOUS at 08:03

## 2023-01-01 RX ADMIN — Medication 1 UNITS: at 10:38

## 2023-01-01 RX ADMIN — EPINEPHRINE 0.1 MCG/KG/MIN: 1 INJECTION INTRAMUSCULAR; INTRAVENOUS; SUBCUTANEOUS at 15:28

## 2023-01-01 RX ADMIN — Medication 100 MG: at 13:40

## 2023-01-01 RX ADMIN — METHYLPREDNISOLONE SODIUM SUCCINATE 40 MG: 40 INJECTION, POWDER, FOR SOLUTION INTRAMUSCULAR; INTRAVENOUS at 07:02

## 2023-01-01 RX ADMIN — IPRATROPIUM BROMIDE AND ALBUTEROL SULFATE 3 ML: 2.5; .5 SOLUTION RESPIRATORY (INHALATION) at 07:36

## 2023-01-01 RX ADMIN — TAZOBACTAM SODIUM AND PIPERACILLIN SODIUM 4.5 G: 500; 4 INJECTION, SOLUTION INTRAVENOUS at 03:13

## 2023-01-01 RX ADMIN — PHENYLEPHRINE HYDROCHLORIDE 200 MCG: 10 INJECTION INTRAVENOUS at 13:47

## 2023-01-01 RX ADMIN — TAZOBACTAM SODIUM AND PIPERACILLIN SODIUM 4.5 G: 500; 4 INJECTION, SOLUTION INTRAVENOUS at 03:03

## 2023-01-01 RX ADMIN — TAMSULOSIN HYDROCHLORIDE 0.4 MG: 0.4 CAPSULE ORAL at 08:37

## 2023-01-01 RX ADMIN — PROPOFOL 50 MG: 10 INJECTION, EMULSION INTRAVENOUS at 14:31

## 2023-01-01 RX ADMIN — FUROSEMIDE 20 MG: 10 INJECTION, SOLUTION INTRAMUSCULAR; INTRAVENOUS at 01:20

## 2023-01-01 RX ADMIN — AZITHROMYCIN MONOHYDRATE 500 MG: 500 INJECTION, POWDER, LYOPHILIZED, FOR SOLUTION INTRAVENOUS at 17:09

## 2023-01-01 RX ADMIN — FUROSEMIDE 20 MG: 10 INJECTION, SOLUTION INTRAMUSCULAR; INTRAVENOUS at 13:43

## 2023-01-01 RX ADMIN — TAZOBACTAM SODIUM AND PIPERACILLIN SODIUM 4.5 G: 500; 4 INJECTION, SOLUTION INTRAVENOUS at 14:07

## 2023-01-01 RX ADMIN — TAZOBACTAM SODIUM AND PIPERACILLIN SODIUM 4.5 G: 500; 4 INJECTION, SOLUTION INTRAVENOUS at 08:30

## 2023-01-01 ASSESSMENT — ACTIVITIES OF DAILY LIVING (ADL)
ADLS_ACUITY_SCORE: 35
ADLS_ACUITY_SCORE: 29
ADLS_ACUITY_SCORE: 25
ADLS_ACUITY_SCORE: 29
ADLS_ACUITY_SCORE: 29
ADLS_ACUITY_SCORE: 27
ADLS_ACUITY_SCORE: 29
ADLS_ACUITY_SCORE: 25
ADLS_ACUITY_SCORE: 25
ADLS_ACUITY_SCORE: 29
ADLS_ACUITY_SCORE: 29
ADLS_ACUITY_SCORE: 35
ADLS_ACUITY_SCORE: 25
ADLS_ACUITY_SCORE: 29
ADLS_ACUITY_SCORE: 29
ADLS_ACUITY_SCORE: 25
ADLS_ACUITY_SCORE: 29
ADLS_ACUITY_SCORE: 25
ADLS_ACUITY_SCORE: 29
ADLS_ACUITY_SCORE: 25
ADLS_ACUITY_SCORE: 25
ADLS_ACUITY_SCORE: 29
ADLS_ACUITY_SCORE: 25
ADLS_ACUITY_SCORE: 29
ADLS_ACUITY_SCORE: 25
ADLS_ACUITY_SCORE: 29
ADLS_ACUITY_SCORE: 25
ADLS_ACUITY_SCORE: 29
ADLS_ACUITY_SCORE: 25
ADLS_ACUITY_SCORE: 29
ADLS_ACUITY_SCORE: 25
ADLS_ACUITY_SCORE: 29
ADLS_ACUITY_SCORE: 25
ADLS_ACUITY_SCORE: 27
ADLS_ACUITY_SCORE: 25
ADLS_ACUITY_SCORE: 29
ADLS_ACUITY_SCORE: 25
ADLS_ACUITY_SCORE: 25
ADLS_ACUITY_SCORE: 29
ADLS_ACUITY_SCORE: 29
ADLS_ACUITY_SCORE: 35
ADLS_ACUITY_SCORE: 29
ADLS_ACUITY_SCORE: 29
ADLS_ACUITY_SCORE: 25
ADLS_ACUITY_SCORE: 29
ADLS_ACUITY_SCORE: 29
ADLS_ACUITY_SCORE: 27

## 2023-01-01 ASSESSMENT — LIFESTYLE VARIABLES: TOBACCO_USE: 1

## 2023-01-01 ASSESSMENT — COPD QUESTIONNAIRES: COPD: 1

## 2023-01-01 ASSESSMENT — ENCOUNTER SYMPTOMS
FEVER: 0
COUGH: 1
SHORTNESS OF BREATH: 1
SHORTNESS OF BREATH: 1
VOMITING: 0
DIARRHEA: 0
WEIGHT LOSS: 1
COUGH: 1

## 2023-02-10 PROBLEM — J18.9 PNEUMONIA: Status: ACTIVE | Noted: 2023-01-01

## 2023-02-10 PROBLEM — J96.90 RESPIRATORY FAILURE (H): Status: ACTIVE | Noted: 2023-01-01

## 2023-02-10 NOTE — ED PROVIDER NOTES
History     Chief Complaint:  Shortness of Breath       HPI   Jaswant Sawyer is a 82 year old male with a history of hypertension, tobacco abuse, CLL and asthma who presents with shortness of breath. His son reports that he when he came to see the patient today he noticed he was short of breath prompting ED visit. The patient states he has been more short of breath for the past few weeks with an associated cough. He quit smoking about 4-5 years ago. He is not oxygen dependent and does not currently use nebulizer or inhalers as he reports this was discontinued by his doctor. He reports that he has been losing weight recently, approximated to be 30 lbs. Denies fever, vomiting or diarrhea.    Independent Historian:   Son - They provide additional history, see details in HPI above.    Review of External Notes: Reviewed clinic visit for urologist on October 20, 2022    ROS:  Review of Systems   Constitutional: Negative for fever.   Respiratory: Positive for cough and shortness of breath.    Gastrointestinal: Negative for diarrhea and vomiting.   All other systems reviewed and are negative.      Allergies:  No Known Allergies     Medications:    Albuterol  Tessalon  Breo ellipta  Hydrodiuril  Duoneb    Past Medical History:    Hypertension  Tobacco abuse  CLL  Bladder neck contracture  Bladder stone  BPH  Asthma  MRSA infection    Past Surgical History:    Appendectomy  Bladder surgery, bladder stone removal  TURP  Cataract removal    Social History:  The patient presents to the ED with his son.  He quit smoking 4-5 years ago.  PCP: Martínez Mathur     Physical Exam     Patient Vitals for the past 24 hrs:   BP Pulse Resp SpO2   02/10/23 1354 -- 92 -- 95 %   02/10/23 1353 -- 100 -- 96 %   02/10/23 1330 104/73 93 -- 93 %   02/10/23 1315 -- 93 -- 95 %   02/10/23 1300 102/73 93 -- 95 %   02/10/23 1245 -- -- -- 95 %   02/10/23 1221 119/78 118 (!) 34 (!) 83 %        Physical Exam  Gen: well appearing, in no acute distress  Oral  : Mucous membranes moist,   Nose: No rhinorhea  Ears: External near normal, without drainage  Eyes: periorbital tissues and sclera normal   Neck: supple, no abnormal swelling  Lungs: rattling cough, decreased breaths bilaterally, mild tachypnea, speaks full sentences  CV: Regular rate, regular rhythm  Abd: soft, nontender, nondistended, no rebound/guarding  Ext: no lower extremity edema  Skin: warm, dry, well perfused, no rashes/bruising/lesions on exposed skin  Neuro: alert, no gross motor or sensory deficits,   Psych: pleasant mood, normal affect      Emergency Department Course     Imaging:  XR Chest Port 1 View   Final Result   IMPRESSION: Stable cardiomediastinal silhouette. New interstitial and   alveolar opacities throughout both lungs, left greater than right,   differential includes moderate/severe pulmonary edema and multifocal   pneumonia. Small left pleural effusion. No pneumothorax. Bones are   unchanged.      DONATO LOUIE MD            SYSTEM ID:  [id number]         Report per radiology    Laboratory:  Labs Ordered and Resulted from Time of ED Arrival to Time of ED Departure   BASIC METABOLIC PANEL - Abnormal       Result Value    Sodium 141      Potassium 5.2      Chloride 106      Carbon Dioxide (CO2) 22      Anion Gap 13      Urea Nitrogen 11.1      Creatinine 1.11      Calcium 8.9      Glucose 143 (*)     GFR Estimate 67     BLOOD GAS VENOUS - Abnormal    pH Venous 7.37      pCO2 Venous 42      pO2 Venous 13 (*)     Bicarbonate Venous 24      Base Excess/Deficit (+/-) -1.4      FIO2 4     CBC WITH PLATELETS AND DIFFERENTIAL - Abnormal    WBC Count 31.8 (*)     RBC Count 4.47      Hemoglobin 14.8      Hematocrit 46.2       (*)     MCH 33.1 (*)     MCHC 32.0      RDW 18.5 (*)     Platelet Count 103 (*)    NT PROBNP INPATIENT - Abnormal    N terminal Pro BNP Inpatient 2,640 (*)    INFLUENZA A/B & SARS-COV2 PCR MULTIPLEX - Normal    Influenza A PCR Negative      Influenza B PCR Negative       RSV PCR Negative      SARS CoV2 PCR Negative     LACTIC ACID WHOLE BLOOD - Normal    Lactic Acid 1.8     CREATININE        Emergency Department Course & Assessments:           Interventions:  Medications   azithromycin 500 mg (ZITHROMAX) in 0.9% NaCl 250 mL intermittent infusion 500 mg (has no administration in time range)   cefTRIAXone (ROCEPHIN) 1 g vial to attach to  mL bag for ADULTS or NS 50 mL bag for PEDS (1 g Intravenous New Bag 2/10/23 1601)   melatonin tablet 1 mg (has no administration in time range)   enoxaparin ANTICOAGULANT (LOVENOX) injection 40 mg (has no administration in time range)   acetaminophen (TYLENOL) tablet 650 mg (has no administration in time range)     Or   acetaminophen (TYLENOL) Suppository 650 mg (has no administration in time range)   ondansetron (ZOFRAN ODT) ODT tab 4 mg (has no administration in time range)     Or   ondansetron (ZOFRAN) injection 4 mg (has no administration in time range)   cefTRIAXone (ROCEPHIN) 2 g vial to attach to  ml bag for ADULTS or NS 50 ml bag for PEDS (has no administration in time range)   azithromycin 500 mg (ZITHROMAX) in 0.9% NaCl 250 mL intermittent infusion 500 mg (has no administration in time range)   ipratropium - albuterol 0.5 mg/2.5 mg/3 mL (DUONEB) neb solution 3 mL (3 mLs Nebulization Given 2/10/23 1601)   albuterol (PROVENTIL) neb solution 2.5 mg (has no administration in time range)   ipratropium - albuterol 0.5 mg/2.5 mg/3 mL (DUONEB) neb solution 3 mL (3 mLs Nebulization Given 2/10/23 1318)   methylPREDNISolone sodium succinate (solu-MEDROL) injection 100 mg (100 mg Intravenous Given 2/10/23 1319)   cefTRIAXone (ROCEPHIN) 1 g vial to attach to  mL bag for ADULTS or NS 50 mL bag for PEDS (1 g Intravenous New Bag 2/10/23 1432)        Independent Interpretation (X-rays, CTs, rhythm strip):  Chest x-ray shows left-sided pneumonia    Consultations/Discussion of Management or Tests:  3548 I spoke with Dr. Jim,  hospitalist, who accepts the patient.       Social Determinants of Health affecting care:   None    Assessments:  1247 I obtained history and examined the patient as noted above.  1420 I rechecked the patient and explained findings.    Disposition:  The patient was admitted to the hospital under the care of Dr. Jim.     Impression & Plan      Medical Decision Making:  Patient presents emergency department with cough shortness of breath symptoms.  Given neb treatment on arrival did not seem to help much although he is feeling better on nasal cannula oxygen.  Patient also received a dose of steroids given his history of COPD.  Has chronic history of CLL.  Chest x-ray shows pneumonia.  Antibiotics initiated.  We will plan on admission for respiratory failure hypoxia, pneumonia.  Contacted hospitalist is in agreement.        Diagnosis:    ICD-10-CM    1. Pneumonia due to infectious organism, unspecified laterality, unspecified part of lung  J18.9       2. Acute respiratory failure with hypoxia (H)  J96.01              Scribe Disclosure:  TOBIAS, Mary Nathaniel, am serving as a scribe at 12:39 PM on 2/10/2023 to document services personally performed by Grayson Perez MD based on my observations and the provider's statements to me.     2/10/2023   Grayson Perez MD Tschetter, Paul Anthony, MD  02/10/23 4499

## 2023-02-10 NOTE — PHARMACY-ADMISSION MEDICATION HISTORY
Admission medication history interview status for this patient is complete. See [facility] admission navigator for allergy information, prior to admission medications and immunization status.     Medication history interview done, indicate source(s): Family  Medication history resources (including written lists, pill bottles, clinic record):SureScripts, Care Everywhere  Pharmacy: [id number] [place], [place] - [address]    Changes made to PTA medication list:  Added: Flomax  Changed: none  Reported as Not Taking: none  Removed: albuterol inhalers, Breo 100-25, Duoneb, nicotine patch, prednisone    Actions taken by pharmacist (provider contacted, etc):sticky note to hospitalist     Additional medication history information:per son, patient is not currently on any inhalers. All the removed meds above were entries from 2020. No recent fills of any inhalers from dispensing records.    Medication reconciliation/reorder completed by provider prior to medication history?  N    Medication Affordability:  Not including over the counter (OTC) medications, was there a time in the past 12 months when you did not take your medications as prescribed because of cost?: Unable to Assess     Prior to Admission medications    Medication Sig Last Dose Taking? Auth Provider Long Term End Date   hydrochlorothiazide (MICROZIDE) 12.5 MG capsule Take 12.5 mg by mouth daily 2/9/2023 Yes Unknown, Entered By History No    tamsulosin (FLOMAX) 0.4 MG capsule Take 0.4 mg by mouth daily 2/9/2023 Yes Unknown, Entered By History

## 2023-02-10 NOTE — H&P
[facility]  Hospitalist History & Physical     Assessment & Plan     ASSESSMENT    82M with hx of suspected COPD (extensive smoking hx but no pfts on file), CLL (not on tx for this), and HTN presents with shortness of breath and found to have respiratory failure suspect 2/2 PNA and AE COPD. However, personally reviewed CXR and somewhat unusual infiltrative pattern so will workup further with CT chest for further workup. BNP indeterminate and no hx of CHF - given asymmetric pattern of CXR low concern that this is volume related but will workup further with echocardiogram.    PLAN    Acute Hypoxic Respiratory Failure  -On adm, needing 2L to keep sats >90%  -Suspect 2/2 PNA and AE COPD  -However, personally reviewed CXR and  unusual infiltrative pattern so will workup further with CT chest  -BNP indeterminate and no hx of CHF - will workup further with echocardiogram.    Sepsis 2/2 Community Acquired PNA  -On adm, met 3/4 SIRS with concern for pulmonary source  -Ceftriaxone 2g q24h  -Azithromycin 500mg every day    COPD Exacerbation  -Extensive smoking hx but no pfts on file  -Has since quit smoking  -Methylprednisolone 40mg IV bid  -Azithromycin  -Scheduled and PRN nebs  -Needs o/p PFTs after hospital stay    CLL  -Not on tx for this outpatient but does follow with oncology  -F/u with oncology after hospital stay    Essential HTN  -Hold antihypertensives in setting of sepsis    DVT Prophy  -LMWH    Disposition  -Med-Surg      Guzman Jim MD    History of Present Illness     Jaswant Sawyer is a 82 year old with hx of suspected COPD (extensive smoking hx but no pfts on file), CLL (not on tx for this), and HTN presents with shortness of breath. Patient was in his normal state of health until 1-2 weeks ago when he started experiencing shortness of breath. Most notable with exertion. Denies cough, fevers, or chills. Denies LE edema. Has orthopnea but chronic for patient. Has had weight loss recently due to  "dentures that don't fit leading to poor oral intake. Denies aspiration concerns. Hx of suspected COPD and tobacco abuse but has since quit smoking. Hospitalization in the past for suspected COPD exacerbation. In the ED, needing 2L to keep sats >90%. HR and RR elevated as well. Other VSS. WBC of 31 (chronically elevated). BNP of 2640. CXR with bilateral infiltrates, L>R concerning for PNA vs pulm edema. Started on antibiotics and admitted to medicine.    Review of Systems     A Comprehensive greater than 10 system review of systems was carried out.  Pertinent positives and negatives are noted above.  Otherwise negative for contributory information.     Past Medical History     Past Medical History:   Diagnosis Date     Essential hypertension     untreated     Tobacco abuse      Medications     Current Outpatient Medications   Medication Sig Dispense Refill     hydrochlorothiazide (MICROZIDE) 12.5 MG capsule Take 12.5 mg by mouth daily       tamsulosin (FLOMAX) 0.4 MG capsule Take 0.4 mg by mouth daily       Past Surgical History     Past Surgical History:   Procedure Laterality Date     APPENDECTOMY       BLADDER SURGERY      bladder stone removal      TURP       Family History   Denies family hx of lung disease    Allergies   No Known Allergies    Social History     Social History     Tobacco Use     Smoking status: Former     Packs/day: 1.00     Types: Cigarettes     Smokeless tobacco: Not on file   Substance Use Topics     Alcohol use: Not Currently     Physical Exam   Blood pressure 107/72, pulse 94, resp. rate (!) 34, height 1.854 m (6' 1\"), weight 80.3 kg (177 lb), SpO2 95 %.    General: Ill appearing, cooperative with exam, in NAD.  HEENT: Atraumatic. No erythema in posterior pharynx.  Lymph: No cervical or inguinal lymphadenopathy.  Cardiac: RRR. No murmurs.  Lungs: Diminished breath sounds throughout. Nl WOB.  Abd: Non-tender. No rebound or gaurding. Nl bowel sounds.  Ext: No edema. 2+ pulses.  Skin: No " rashes, abrasions, or contusions.  Psych: A&Ox3. Nl affect.  Neuro: 5/5 strength. Sensation intact.    Labs & Imaging     Reviewed and Pertinent results discussed in assessment and plan.

## 2023-02-10 NOTE — ED TRIAGE NOTES
Pt presents with SOB that started 2 weeks ago. Pt is breathing rapidly in triage and oxygen is 83% on room air. Hx of COPD. Denies chest pain. Pt went up to 92% on 6L nasal cannula.      Triage Assessment     Row Name 02/10/23 1225       Triage Assessment (Adult)    Airway WDL WDL       Respiratory WDL    Respiratory WDL rhythm/pattern    Rhythm/Pattern, Respiratory tachypneic;shortness of breath;shallow;breathlessness       Skin Circulation/Temperature WDL    Skin Circulation/Temperature WDL X    Skin Circulation pallor       Cardiac WDL    Cardiac WDL WDL       Peripheral/Neurovascular WDL    Peripheral Neurovascular WDL capillary refill    Capillary Refill, General greater than 3 secs       Cognitive/Neuro/Behavioral WDL    Cognitive/Neuro/Behavioral WDL WDL

## 2023-02-10 NOTE — ED NOTES
"[facility]  ED Nurse Handoff Report    Jaswant KEAGAN Sawyer is a 82 year old male   ED Chief complaint: Shortness of Breath  . ED Diagnosis:   Final diagnoses:   Pneumonia due to infectious organism, unspecified laterality, unspecified part of lung   Acute respiratory failure with hypoxia (H)     Allergies: No Known Allergies    Code Status: Full Code  Activity level - Baseline/Home:  Independent. Activity Level - Current:   Stand by Assist. Lift room needed: No. Bariatric: No   Needed: No   Isolation: Yes. Infection: Not Applicable  MRSA.     Vital Signs:   Vitals:    02/10/23 1330 02/10/23 1353 02/10/23 1354 02/10/23 1644   BP: 104/73   107/72   Pulse: 93 100 92 94   Resp:       SpO2: 93% 96% 95% 95%   Weight:    80.3 kg (177 lb)   Height:    1.854 m (6' 1\")       Cardiac Rhythm:  ,      Pain level:   None  Patient confused: No. Patient Falls Risk: Yes.   Elimination Status: Has voided   Patient Report - Initial Complaint: Pt presents with SOB that started 2 weeks ago. Pt is breathing rapidly in triage and oxygen is 83% on room air. Hx of COPD. Denies chest pain. Pt went up to 92% on 6L nasal cannula.   Focused Assessment:    Tests Performed: CXR,  Chest CT  Abnormal Results: See results.   Treatments provided: Rocephin, zithromax, neb, solu-medrol   Family Comments: Son at bedside  OBS brochure/video discussed/provided to patient:  No  ED Medications:   Medications   melatonin tablet 1 mg (has no administration in time range)   enoxaparin ANTICOAGULANT (LOVENOX) injection 40 mg (40 mg Subcutaneous Given 2/10/23 1711)   acetaminophen (TYLENOL) tablet 650 mg (has no administration in time range)     Or   acetaminophen (TYLENOL) Suppository 650 mg (has no administration in time range)   ondansetron (ZOFRAN ODT) ODT tab 4 mg (has no administration in time range)     Or   ondansetron (ZOFRAN) injection 4 mg (has no administration in time range)   cefTRIAXone (ROCEPHIN) 2 g vial to attach to  ml " bag for ADULTS or NS 50 ml bag for PEDS (has no administration in time range)   azithromycin 500 mg (ZITHROMAX) in 0.9% NaCl 250 mL intermittent infusion 500 mg (has no administration in time range)   ipratropium - albuterol 0.5 mg/2.5 mg/3 mL (DUONEB) neb solution 3 mL (3 mLs Nebulization Given 2/10/23 1601)   albuterol (PROVENTIL) neb solution 2.5 mg (has no administration in time range)   methylPREDNISolone sodium succinate (solu-MEDROL) injection 40 mg (has no administration in time range)   ipratropium - albuterol 0.5 mg/2.5 mg/3 mL (DUONEB) neb solution 3 mL (3 mLs Nebulization Given 2/10/23 1318)   methylPREDNISolone sodium succinate (solu-MEDROL) injection 100 mg (100 mg Intravenous Given 2/10/23 1319)   cefTRIAXone (ROCEPHIN) 1 g vial to attach to  mL bag for ADULTS or NS 50 mL bag for PEDS (1 g Intravenous New Bag 2/10/23 1432)   azithromycin 500 mg (ZITHROMAX) in 0.9% NaCl 250 mL intermittent infusion 500 mg (500 mg Intravenous New Bag 2/10/23 1639)   cefTRIAXone (ROCEPHIN) 1 g vial to attach to  mL bag for ADULTS or NS 50 mL bag for PEDS (1 g Intravenous New Bag 2/10/23 1601)     Drips infusing:  No  For the majority of the shift, the patient's behavior Green. Interventions performed were See MAR.    Sepsis treatment initiated: No     Patient tested for COVID 19 prior to admission: YES    ED Nurse Name/Phone Number: Uma Avila RN,   5:43 PM      RECEIVING UNIT ED HANDOFF REVIEW    Above ED Nurse Handoff Report was reviewed: Yes  Reviewed by: Chidi Montoya RN on February 10, 2023 at 5:48 PM

## 2023-02-11 NOTE — PLAN OF CARE
ROOM # 229    Living Situation (if not independent, order SW consult):  Facility name:  : son Jaswant    Activity level at baseline: ind  Activity level on admit: SBA    Who will be transporting you at discharge: son    Patient registered to observation; given Patient Bill of Rights; given the opportunity to ask questions about observation status and their plan of care.  Patient has been oriented to the observation room, bathroom and call light is in place.    Discussed discharge goals and expectations with patient/family.

## 2023-02-11 NOTE — PLAN OF CARE
CT results with ILD w/ possible superimposed PNA as well as lymphadenopathy and ?mass concerning for CA of unclear primary. Attempted to call son with results but went to . Increased steroid frequency and added consults for pulmonology and hem/onc.    Guzman Jim MD

## 2023-02-11 NOTE — CONSULTS
[facility]    Hematology/Oncology Consult Note      Date of Admission:  2/10/2023  Date of Consult:  02/11/23  Reason for Consult: concern for metastatic cancer  Primary Oncologist Brian Bose at [facility]       ASSESSMENT/PLAN:  Jaswant Sawyer is a 82 year old male admitted with pneumonia found to have Imaging findings concerning for metastatic disease including sclerotic lesions      Malignancy work up:   -check psa  -check SPEP and immunoglobulin levels if IgG less than 600 may give IvIG in light of recent infection  -get bone biopsy to find out site of metastatic disease (certainly can be done outpatient ) once pneumonia resolves  -Ct abdomen and pelvis for staging  -follow up with his outpatient oncologist after discharge for plan for treatment,  Biopsies can be done in the outpatient setting if not feasible in the hospital due to his active infection       Thrombocytopenia:      may be due to recent infection, check dic panel, for now follow. Ct abdomen and pelvis to check for splenomegaly  -peripheral smear    CLL: monitor    MGUS: check spep    Total time spent on day of visit, including review of tests, obtaining/reviewing separately obtained history, ordering medications/tests/procedures, communicating with PCP/consultants, and documenting in electronic medical record: 90 minutes        HISTORY OF PRESENT ILLNESS: Jaswant Sawyer is a 82 year old male who presents with multiple comorbidities as below was admitted with pneumonia.  CT of the chest was obtainedWhich are patchy bilateral opacities suspicious for multifocal pneumonia.  Left lower lobe masslike opacity measuring 5.3 cm which may represent pneumonia although malignancy cannot be excluded.  Mediastinal lymphadenopathy favored to be metastatic rather than reactive.  Scattered sclerotic osseous lesions suspicious for metastatic disease. We are asked to see him for further work up.  His white count is 31.7 which is  chronically elevated due to CLL hemoglobin 12.6 platelet count at 79,000 previously at 103      Other comorbidities include COPD, CLL Which is currently on surveillance only, history of MGUS, history of elevated PSA follows with urology    Mr Sawyer remains SOB. He overall feels ok.  No pain, no night sweats    REVIEW OF SYSTEMS:   14 point ROS was reviewed and is negative other than as noted above in HPI.       MEDICATIONS:  Current Facility-Administered Medications   Medication     acetaminophen (TYLENOL) tablet 650 mg    Or     acetaminophen (TYLENOL) Suppository 650 mg     albuterol (PROVENTIL) neb solution 2.5 mg     azithromycin 500 mg (ZITHROMAX) in 0.9% NaCl 250 mL intermittent infusion 500 mg     cefTRIAXone (ROCEPHIN) 2 g vial to attach to  ml bag for ADULTS or NS 50 ml bag for PEDS     enoxaparin ANTICOAGULANT (LOVENOX) injection 40 mg     hydrochlorothiazide (MICROZIDE) capsule 12.5 mg     ipratropium - albuterol 0.5 mg/2.5 mg/3 mL (DUONEB) neb solution 3 mL     melatonin tablet 1 mg     methylPREDNISolone sodium succinate (solu-MEDROL) injection 40 mg     ondansetron (ZOFRAN ODT) ODT tab 4 mg    Or     ondansetron (ZOFRAN) injection 4 mg     tamsulosin (FLOMAX) capsule 0.4 mg         ALLERGIES:  No Known Allergies      PAST MEDICAL HISTORY:  Past Medical History:   Diagnosis Date     Essential hypertension     untreated     Tobacco abuse          PAST SURGICAL HISTORY:  Past Surgical History:   Procedure Laterality Date     APPENDECTOMY       BLADDER SURGERY      bladder stone removal      TURP           SOCIAL HISTORY:  Social History     Socioeconomic History     Marital status: Single     Spouse name: Not on file     Number of children: Not on file     Years of education: Not on file     Highest education level: Not on file   Occupational History     Not on file   Tobacco Use     Smoking status: Former     Packs/day: 1.00     Types: Cigarettes     Smokeless tobacco: Not on file   Substance and  "Sexual Activity     Alcohol use: Not Currently     Drug use: Not on file     Sexual activity: Not on file   Other Topics Concern     Not on file   Social History Narrative     Not on file     Social Determinants of Health     Financial Resource Strain: Not on file   Food Insecurity: Not on file   Transportation Needs: Not on file   Physical Activity: Not on file   Stress: Not on file   Social Connections: Not on file   Intimate Partner Violence: Not on file   Housing Stability: Not on file         FAMILY HISTORY:  No family history on file.      PHYSICAL EXAM:  Vital signs:  Temp: 97.7  F (36.5  C) Temp src: Oral BP: 104/67 Pulse: 101   Resp: 20 SpO2: 92 % O2 Device: Nasal cannula Oxygen Delivery: 4 LPM Height: 188 cm (6' 2\") Weight: 83.1 kg (183 lb 4.8 oz)  Estimated body mass index is 23.53 kg/m  as calculated from the following:    Height as of this encounter: 1.88 m (6' 2\").    Weight as of this encounter: 83.1 kg (183 lb 4.8 oz).    ECO  GENERAL/CONSTITUTIONAL: No acute distress.  EYES: Pupils are equal, round, and react to light and accommodation. Extraocular movements intact.  No scleral icterus.  ENT/MOUTH: Neck supple. Oropharynx clear, no mucositis.  LYMPH: No anterior cervical, posterior cervical, supraclavicular, axillary or inguinal adenopathy.   RESPIRATORY: bilateral rales and wheezing   CARDIOVASCULAR: Regular rate and rhythm without murmurs, gallops, or rubs.  GASTROINTESTINAL: No hepatosplenomegaly, masses, or tenderness. The patient has normal bowel sounds. No guarding.  No distention.  MUSCULOSKELETAL: Warm and well-perfused, no cyanosis, clubbing, or edema.  NEUROLOGIC: Cranial nerves II-XII are intact. Alert, oriented, answers questions appropriately.  INTEGUMENTARY: No rashes or jaundice.  GAIT: Steady, does not use assistive device      LABS:  CBC RESULTS:   Recent Labs   Lab Test 23  0538   WBC 31.7*   RBC 3.83*   HGB 12.6*   HCT 39.5*   *   MCH 32.9   MCHC 31.9   RDW 17.9* "   PLT 79*       Recent Labs   Lab Test 02/11/23  0538 02/10/23  1629 02/10/23  1250     --  141   POTASSIUM 4.9  --  5.2   CHLORIDE 109*  --  106   CO2 21*  --  22   ANIONGAP 10  --  13   *  --  143*   BUN 15.3  --  11.1   CR 1.06 1.11 1.11   REMI 8.8  --  8.9         PATHOLOGY:  na    IMAGING:    Ct chest finding reviewed        Thank you for the opportunity to participate in this patient's care.  Please call with any questions.    Mikey Daily MD  Hematology/Oncology  [facility]

## 2023-02-11 NOTE — PLAN OF CARE
"PRIMARY DIAGNOSIS: PNEUMONIA  OUTPATIENT/OBSERVATION GOALS TO BE MET BEFORE DISCHARGE:  Dyspnea improved and O2 sats >88% on RA or back to baseline O2 levels: No   SpO2: 90 %, O2 Device: Nasal cannula 4L    Tolerating oral abx or appropriate plans made outpatient infusion: No, IV abx continues    Vitals signs normal or return to baseline: Yes    Short term supplemental O2 needed with activity at home: Unknown at this time    Tolerate oral intake to maintain hydration: Yes    Return to near baseline physical activity: Yes    Discharge Planner Nurse   Safe discharge environment identified: Yes  Barriers to discharge: No       Entered by: Giovana Fajardo RN 02/11/2023 8:54 AM   /79 (BP Location: Left arm)   Pulse 102   Temp 97.7  F (36.5  C) (Oral)   Resp 24   Ht 1.88 m (6' 2\")   Wt 83.1 kg (183 lb 4.8 oz)   SpO2 90%   BMI 23.53 kg/m    Patients WBC 31.7, lactic order. On 4L NC O2, patient is not oxygen dependant at baseline.   Please review provider order for any additional goals.   Nurse to notify provider when observation goals have been met and patient is ready for discharge.  "

## 2023-02-11 NOTE — PLAN OF CARE
"INPATIENT NOTE: 1900-0700     PRIMARY PROBLEM: Pneumonia          Vital signs:  Temp: 97.8  F (36.6  C) Temp src: Axillary BP: 108/70 Pulse: 92   Resp: 20 SpO2: 92 % O2 Device: Nasal cannula Oxygen Delivery: 4 LPM Height: 188 cm (6' 2\") Weight: 83.1 kg (183 lb 4.8 oz)  Estimated body mass index is 23.53 kg/m  as calculated from the following:    Height as of this encounter: 1.88 m (6' 2\").    Weight as of this encounter: 83.1 kg (183 lb 4.8 oz).        Orientation: Alert and Oriented x4  Neuro: Intact   Pain status: denying pain.   Resp: Lung sounds are Clear. Denies any SOB.   Cardiac: WNL, Denies any Chest Pain   GI: Bowels are active x 4 Quads. Last BM 2/10/23  : Voiding with no concern    Skin: WNL   LDA: Peripheral IV   Infusions: Patient is Saline locked.   Pertinent Labs: pot and Mag protocol  Diet: Regular  Activity: are SBA with no assistive devices   Isolation:   Patient is on Contact precuations for MRSA.    Consults: Hematology & Oncology, pulmonology   Shift Event: pt is scheduled for Echocardiogram. Pt is on Rocephin and Azithromycin for pneumonia                                Will continue to monitor and provide cares.     Jermaine Jackson RN    "

## 2023-02-11 NOTE — PLAN OF CARE
Discussed with nursing, BP now getting low. Hold diuresis for now and will start trial of midodrine and give 500cc bolus. Unfortunately complicated picture with suspected infection, possible overload but increasing lactic acid. Cont antibiotics with serial labs and interventions as above. Remains afebrile and mildly tachycardic which has been persistent, clinically doing well otherwise.

## 2023-02-11 NOTE — PROGRESS NOTES
"[facility]     Date: 02/10/23  Admission Dx: PNA  Pulmonary History: COPD hx, tobacco abuse  Home Nebulizer/MDI Use: None  Home Oxygen: None  Acuity Level (RCAT flow sheet): 3  Aerosol Therapy initiated: Duoneb QID and Albuterol Q2 prn      Pulmonary Hygiene initiated: Coughing techniques       Volume Expansion initiated: Incentive Spirometry      Current Oxygen Requirements: 4 LPM Nasal cannula  Current SpO2: 90%    Re-evaluation date: 02/13/23    Patient Education: Will continue with current plan of care.      See \"RT Assessments\" flow sheet for patient assessment scoring and Acuity Level Details.             "

## 2023-02-11 NOTE — PROGRESS NOTES
[facility]    Medicine Progress Note - Hospitalist Service    Date of Admission:  2/10/2023    Assessment & Plan    82M with hx of suspected COPD (extensive smoking hx but no pfts on file), CLL (not on tx for this), and HTN presents with shortness of breath and found to have respiratory failure suspected to be due to PNA and AE COPD. However CXR was abnormal so CT was done showing patchy bilateral opacities suspicious for multifocal pneumonia and LLL mass like opacity 5.3cm with sclerotic lesions concerning for metastatic disease. Also with findings suggestive of underlying interstitial disease and mediastinal lymphadenopathy.    He's also reporting exertional dyspnea, non productive cough, LE edema, orthopnea over past few weeks to month. BNP was elevated at 2,640 up from 196 in 2020 when last checked and ECHO this admission showing EF 60-65% but suspected diastolic dysfunction and possibly R ventricular dysfunction raising possibility of volume overload contributing.     Acute Hypoxic Respiratory Failure  -On adm, needing 2L to keep sats >90% and requring up to 4L NC  -initially suspected 2/2 PNA and AE COPD but now concern for ILD and fluid overload contributing  -no wheezing on exam, cont nebs but decrease steroids to 40 BID IV and cont taper  -suspect volume overload, IV lasix 40x1    suspected metastatic lung cancer  Suspected malnutrition  -extensive smoking hx with recent significant weight loss now w/CT chest showing 5.3cm mass like opacity LLL which may represent pneumonia. However also with evidence of mediastinal lymphadenopathy, scattered sclerotic osseous lesions  -oncology consulted- PSA/SPEP/immunoglobins ordered. Eventually will need bone biopsy (to be done outpt), CT abd/pelvis for staging  -nutrition team consulted    Suspected acute diastolic HF  -ECHO w/EF 60-65%, grade 1 diastolic dysfunction and possibly decreased R ventricular function  -presents with symptoms c/w CHF  including orthopnea, exertional dyspnea, LE edema, dry cough with imaging/labs as above  -trial of IV lasix 40x1 with strict I/O's     Sepsis  Community/atypical PNA  Lactic acidosis  -On adm, met 3/4 SIRS with concern for pulmonary source  -Ceftriaxone 2g q24h and Azithromycin 500mg every day  -lactic acid increasing but clinically and infectious markers are stable, may be related to overload state so await diuresis as above and monitor. Will avoid fluids for now as suspect it is more congestive elevation     Suspected COPD Exacerbation  Possible ILD  -Extensive smoking hx but no pfts on file, has since quit smoking  -CT with findings c/w underlying ILD. Does have hx in  and extensive outdoors exposure historically but not in past few years  -cont nebs, antibiotics, steroids and await pulmonology evaluation on Monday  -not wheezing currently, cont to wean steroids     CLL  thrombocytopenia  -Not on tx for is CLL outpatient but does follow with oncology  -platelets low, cont lovenox but consider stopping if drops further. Is relatively high risk for DVT with possible cancer, immobility  -peripheral smear ordered by oncology     Essential HTN  -home low dose hydrochlorothiazide resumed     Diet: Combination Diet Regular Diet Adult    DVT Prophylaxis: Enoxaparin (Lovenox) SQ  Alfonso Catheter: Not present  Lines: None     Cardiac Monitoring: None  Code Status: Full Code      Disposition Plan   Unclear, possibly after pulm eval on monday     Daniel Poe DO  Hospitalist Service  [facility]  Securely message with [employer] (more info)  Text page via [employer] Paging/Directory   ______________________________________________________________________    Interval History   Still requiring oxygen, about 4L NC. Reports some ongoing dyspnea mostly with exertion at home with non productive cough and some mild LE edema. Also has been sleeping in recliner due to sob and discomfort while laying flat.  Discussed CT findings with him, oncology saw him this afternoon and pulm will likely not see him until Monday. He does endorse weight loss over past 6 months. Quit smoking but has extensive history, also served in  and in [place] so unclear if that is playing a role in his chronic lung disease or not.     Physical Exam   Vital Signs: Temp: 98.1  F (36.7  C) Temp src: Oral BP: 100/67 Pulse: 113   Resp: 22 SpO2: 90 % O2 Device: Nasal cannula Oxygen Delivery: 4 LPM  Weight: 183 lbs 4.8 oz    Constitutional: awake, alert and cooperative, appears underweight  Eyes: pupils equal, round and reactive to light and conjunctiva normal  ENT: normocepalic, without obvious abnormality, atramatic  Respiratory: diffusely diminished with crackles and what sounds like a rub R>L but bilaterally, no wheezing  Cardiovascular: regular rate and rhythm, no murmur noted and trace LE edema  GI: normal bowel sounds, soft and non-distended  Skin: chronic skin changes bilateral LE, no rash  Neurologic: alert, oriented x4, no focal deficits    50 MINUTES SPENT BY ME on the date of service doing chart review, history, exam, documentation & further activities per the note.      Data   ------------------------- PAST 24 HR DATA REVIEWED -----------------------------------------------    I have personally reviewed the following data over the past 24 hrs:    31.7 (H)  \   13.2 (L)   / 94 (L)     140 109 (H) 15.3 /  170 (H)   4.9 21 (L) 1.06 \       Trop: N/A BNP: N/A       Procal: 0.18 (H) CRP: N/A Lactic Acid: 3.2 (H)       INR:  1.19 (H) PTT:  26   D-dimer:  N/A Fibrinogen:  N/A       Ferritin:  N/A % Retic:  5.6 (H) LDH:  N/A       Imaging results reviewed over the past 24 hrs:   Recent Results (from the past 24 hour(s))   CT Chest w/o Contrast    Narrative    EXAM: CT CHEST W/O CONTRAST  LOCATION: [facility]  DATE/TIME: 2/10/2023 5:27 PM    INDICATION: Shortness of breath. Minimal infectious symptoms.  Pulmonary fibrosis versus hypervolemia.  COMPARISON: Chest radiograph 02/10/2023, CT abdomen/pelvis 02/23/2020  TECHNIQUE: CT chest without IV contrast. Multiplanar reformats were obtained. Dose reduction techniques were used.  CONTRAST: None.    FINDINGS:   LUNGS AND PLEURA: Masslike consolidation of the left lower lobe measuring 5.3 x 4.2 cm. Scattered patchy airspace opacities and groundglass opacities throughout both lungs. Moderate upper lobe predominant centrilobular emphysema. Peripheral reticular   interstitial opacities, traction bronchiectasis, and honeycombing at the lung bases. Small left pleural effusion. No right pleural effusion. No pneumothorax.    MEDIASTINUM/AXILLAE: Enlarged subcarinal lymph node measuring 1.7 cm. Enlarged subaortic lymph node measuring 1.8 cm. Enlarged left paratracheal lymph node measuring 1.4 cm. Enlarged right peritracheal lymph node measuring 1.5 cm. Enlarged precarinal   lymph node measuring 1.1 cm. No pericardial effusion.    CORONARY ARTERY CALCIFICATION: Severe.    UPPER ABDOMEN: Large gallstone in the gallbladder measuring 1.5 cm. Left renal upper pole cyst measuring at least 4.2 cm, incompletely imaged; no follow-up indicated.    MUSCULOSKELETAL: Scattered sclerotic lesions in the visualized osseous structures, suspicious for metastatic disease, including multiple thoracic vertebral bodies, the right medial clavicle, and the sternum.      Impression    IMPRESSION:   1.  Patchy bilateral opacities, suspicious for multifocal pneumonia.    2.  Left lower lobe masslike opacity measuring 5.3 cm, which may also represent pneumonia, although malignancy not excluded.    3.  Findings suggestive of underlying interstitial lung disease, likely usual interstitial pneumonia pattern.    4.  Small left pleural effusion.    5.  Mediastinal lymphadenopathy, favored to be metastatic rather than reactive.    6.  Scattered sclerotic osseous lesions, suspicious for metastatic  disease.    Findings (all impression points) were communicated to Dr. Toby Luevano over the telephone by Dr. Gutierrez at 2/10/2023 at 5:56 PM.   Echocardiogram Complete   Result Value    LVEF  60-65%    [facility]    [id number]  [id number]  [id number]  [id number]^[email or web address]^TOBY^CRISTO     [facility]  Echocardiography Laboratory  [address] [place] [address]     Name: RUTH GOTTI  MRN: [id number]  : 1940  Study Date: 2023 07:59 AM  Age: 82 yrs  Gender: Male  Patient Location: [facility]  Reason For Study: CHF  Ordering Physician: TOBY LUEVANO  Referring Physician: Martínez Mathur  Performed By: Grayson Curry RDCS     BSA: 2.0 m2  Height: 73 in  Weight: 177 lb  HR: 109  BP: 107/72 mmHg  ______________________________________________________________________________  Procedure  Complete Portable Echo Adult. Optison (NDC #0362-7205) given intravenously.  ______________________________________________________________________________  Interpretation Summary     Left ventricular systolic function is normal.  The visual ejection fraction is 60-65%.  No regional wall motion abnormalities.  The right ventricle is not well visualized. Grossly appears mildly dilated  with mildly decreased systolic function (apical and mid).  Trace mitral valve regurgitation.  Mild tricuspid valve regurgitation.  The estimated right ventricular systolic pressure is elevated at 63 mmHg plus  the right atrial pressure.  Normal inferior vena cava.  No pericardial effusion.     There is no comparison study available.  ______________________________________________________________________________  Left Ventricle  The left ventricle is normal in size. There is normal left ventricular wall  thickness. Left ventricular systolic function is normal. The visual ejection  fraction is 60-65%. Grade I or early diastolic dysfunction. No regional wall  motion abnormalities noted.     Right Ventricle  The right  ventricle is mildly dilated. The right ventricle is not well  visualized. Mildly decreased right ventricular systolic function.     Atria  Normal left atrial size. Right atrial size is normal. There is no color  Doppler evidence of an atrial shunt.     Mitral Valve  The mitral valve leaflets appear normal. There is no evidence of stenosis,  fluttering, or prolapse. There is trace mitral regurgitation. There is no  mitral valve stenosis.     Tricuspid Valve  Normal tricuspid valve. There is mild (1+) tricuspid regurgitation. The right  ventricular systolic pressure is approximated at 63.3 mmHg plus the right  atrial pressure.     Aortic Valve  The aortic valve is trileaflet with aortic valve sclerosis. There is trace  aortic regurgitation. No hemodynamically significant valvular aortic stenosis.     Pulmonic Valve  The pulmonic valve is not well visualized. There is trace pulmonic valvular  regurgitation. Normal pulmonic valve velocity.     Vessels  The aortic root is normal size. Normal size ascending aorta. The inferior vena  cava is normal.     Pericardium  There is no pericardial effusion.     Rhythm  The rhythm was sinus tachycardia.  ______________________________________________________________________________  MMode/2D Measurements & Calculations  IVSd: 1.1 cm     LVIDd: 4.2 cm  LVIDs: 3.1 cm  LVPWd: 1.1 cm  FS: 24.3 %  LV mass(C)d: 161.9 grams  LV mass(C)dI: 79.2 grams/m2  Ao root diam: 3.5 cm  LVOT diam: 2.1 cm  LVOT area: 3.4 cm2  LA Volume (BP): 45.0 ml  LA Volume Index (BP): 22.1 ml/m2  RWT: 0.54     Time Measurements  Aortic HR: 97.0 BPM     Doppler Measurements & Calculations  MV E max calderon: 54.4 cm/sec  MV A max calderon: 112.0 cm/sec  MV E/A: 0.49  MV max P.6 mmHg  MV mean P.5 mmHg  MV V2 VTI: 16.3 cm  MVA(VTI): 3.1 cm2  MV dec time: 0.23 sec  LV V1 max PG: 3.6 mmHg  LV V1 max: 94.3 cm/sec  LV V1 VTI: 15.1 cm  CO(LVOT): 4.9 l/min  CI(LVOT): 2.4 l/min/m2  SV(LVOT): 50.9 ml  SI(LVOT): 24.9 ml/m2  PA  acc time: 0.09 sec  TR max calderon: 397.7 cm/sec  TR max P.3 mmHg  E/E' avg: 10.0  Lateral E/e': 9.0  Medial E/e': 11.1     ______________________________________________________________________________  Report approved by: Dr Anthony Vines 2023 10:39 AM

## 2023-02-12 NOTE — PROVIDER NOTIFICATION
DATE:  2/11/2023   TIME OF RECEIPT FROM LAB:  Lactic  LAB TEST:  1514  LAB VALUE:  3.2  RESULTS GIVEN WITH READ-BACK TO (PROVIDER):  Deepika BRANDT  TIME LAB VALUE REPORTED TO PROVIDER:   1514;

## 2023-02-12 NOTE — PLAN OF CARE
Care from 2073-2001    Admitting Diagnosis:PNA  Neuro: Alert and Oriented x4 but forgetful  Activity: are SBA with no assistive devices   Telemetry Monitoring: No  Pain: denying pain.  Labs / Tests: Lactic 2.7, 3.2, 4.6 (@1900), WBC 31  GI:WNL  :WNL  Fluids: has Lactated Ringer's running at 150 mL per hour. (500 ml bolus at 1600 for lactic of 3.2, lactic recheck at 1900 is 4.6, LR started MD aware)  Diet: Low fat and 2 gram sodium  Consults: Hem/Onc, Pulmonary Consult, Respiratory  Treatment: Rocephin, Azithromycin, Midodrine, IVF, IV bolus. IR biopsy (Monday)

## 2023-02-12 NOTE — PROGRESS NOTES
[facility]    Hematology/Oncology Follow-up Note      Today's Date: 02/12/23  Date of Admission:  2/10/2023  Reason for Consult:  CLL, Sclerotic lesions   Hematologist Brian Bose at [facility]       ASSESSMENT/PLAN:  Jaswant Sawyer is a 82 year old male with history of CLL admitted with pneumonia     His PSA is elevated at 53 and he has sclerotic lesions of the bone likely he has prostate cancer.  We will obtain a biopsy of the bone lesion.  Immunoglobulin levels pending and if less than 500 I would recommend giving him IVIG in the setting.    -ct of the abdomen reviewed with the patient   -psa elevated, can do androgen blockade outpatient  -CLL and pneumonia may be causing chest adenopathy, would repeat ct chest in 4-6 week after discharge to see if there is an underlying mass that needs further evaluation, pulmonary on board  -will continue to follow with you    1. Pneumonia Continue treatment per hospitalist team. Pulmonary consulted  2 CLL await IVIG levels overall counts stable, if <500 consider ivig treatment.    3 Bone lesions: sclerotic, likely due to prostate cancer PSA 53.0 Biopsy is not necessary in the acute setting if his respiratory status is not improving. I will cancel it for now and he can get this done in the outpatient setting once symptoms improve  4 Lung mass? Repeat ct in 4-6 weeks upon discharge to get a clear picture of the mass vs underlying infection.  5 thrombocytopenia: associated with CLL and infection. Monitor for now.     I did discuss with the patient that if his respiratory status is worse , the biopsy is elective and can certainly wait to be completed in the outpatient setting. He needs to improve with his respiratory status as a priority before further testing. I will cancel it for now    Mikey Daily MD         INTERIM HISTORY:  Jaswant continues to feel SOB. Pulmonary consuletd. Spoke with Dr Poe regarding his overall condition over the phone. States he  "feels better now than he did this morning, he was shocked that he may die during this hospitalization if his respiratory status doesn't improve, he states he was told that this am and is coming to terms with it     MEDICATIONS:  Current Facility-Administered Medications   Medication     acetaminophen (TYLENOL) tablet 650 mg    Or     acetaminophen (TYLENOL) Suppository 650 mg     albuterol (PROVENTIL) neb solution 2.5 mg     enoxaparin ANTICOAGULANT (LOVENOX) injection 40 mg     [Held by provider] hydrochlorothiazide (MICROZIDE) capsule 12.5 mg     ipratropium - albuterol 0.5 mg/2.5 mg/3 mL (DUONEB) neb solution 3 mL     lactated ringers infusion     lidocaine (LMX4) cream     lidocaine 1 % 0.1-1 mL     melatonin tablet 1 mg     methylPREDNISolone sodium succinate (solu-MEDROL) injection 40 mg     midodrine (PROAMATINE) tablet 10 mg     ondansetron (ZOFRAN ODT) ODT tab 4 mg    Or     ondansetron (ZOFRAN) injection 4 mg     piperacillin-tazobactam (ZOSYN) intermittent infusion 4.5 g     sodium chloride (PF) 0.9% PF flush 3 mL     sodium chloride (PF) 0.9% PF flush 3 mL     tamsulosin (FLOMAX) capsule 0.4 mg           ALLERGIES:  No Known Allergies      PHYSICAL EXAM:  Vital signs:  Temp: 97.6  F (36.4  C) Temp src: Oral BP: 102/74 Pulse: 96   Resp: 24 SpO2: 91 % O2 Device: Nasal cannula Oxygen Delivery: 5 LPM Height: 188 cm (6' 2\") Weight: 83.1 kg (183 lb 4.8 oz)  Estimated body mass index is 23.53 kg/m  as calculated from the following:    Height as of this encounter: 1.88 m (6' 2\").    Weight as of this encounter: 83.1 kg (183 lb 4.8 oz).    ECO on 4 liters of NC  GENERAL/CONSTITUTIONAL: bilateral wheezing EYES: Pupils are equal, round, and react to light and accommodation. Extraocular movements intact.  No scleral icterus.  ENT/MOUTH: Neck supple. Oropharynx clear, no mucositis.  LYMPH: No anterior cervical, posterior cervical, supraclavicular, axillary or inguinal adenopathy.   RESPIRATORY: bialteral wheezing " and rales   CARDIOVASCULAR: Regular rate and rhythm without murmurs, gallops, or rubs.  GASTROINTESTINAL: No hepatosplenomegaly, masses, or tenderness. The patient has normal bowel sounds. No guarding.  No distention.  MUSCULOSKELETAL: Warm and well-perfused, no cyanosis, clubbing, or edema.  NEUROLOGIC: Cranial nerves II-XII are intact. Alert, oriented, answers questions appropriately.  INTEGUMENTARY: No rashes or jaundice.  GAIT: Steady, does not use assistive device      LABS:  CBC RESULTS:   Recent Labs   Lab Test 02/12/23  0703   WBC 43.0*   RBC 3.58*   HGB 11.8*   HCT 37.9*   *   MCH 33.0   MCHC 31.1*   RDW 18.7*   PLT 85*       Recent Labs   Lab Test 02/12/23  0703 02/11/23  0538    140   POTASSIUM 4.1 4.9   CHLORIDE 110* 109*   CO2 22 21*   ANIONGAP 11 10   GLC 99 170*   BUN 24.0* 15.3   CR 1.09 1.06   REMI 8.8 8.8         PATHOLOGY:  na    IMAGING:    Ct abdomen and pelvis    LYMPH NODES: No significant retroperitoneal adenopathy.     VASCULATURE: Atherosclerotic calcification without abdominal aortic aneurysm. 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     PELVIC ORGANS: Significant prostatic hypertrophy. There is some asymmetric hyperenhancement in the left peripheral zone (image 198 series 2). No free fluid.     MUSCULOSKELETAL: Old L2 compression fracture. Multiple sclerotic bone lesions, greatest in T10, L2, and L3.       Total time spent on day of visit, including review of tests, obtaining/reviewing separately obtained history, ordering medications/tests/procedures, communicating with PCP/consultants, and documenting in electronic medical record: 35 minutes    Mikey Daily MD  Hematology/Oncology  [facility]

## 2023-02-12 NOTE — PROGRESS NOTES
[facility]    Medicine Progress Note - Hospitalist Service    Date of Admission:  2/10/2023    Assessment & Plan   82M with hx of suspected COPD (extensive smoking hx but no pfts on file), CLL (not on tx for this), and HTN presents with shortness of breath and found to have respiratory failure suspected to be due to PNA and AE COPD. However CXR was abnormal so CT was done showing patchy bilateral opacities suspicious for multifocal pneumonia and LLL mass like opacity 5.3cm with sclerotic lesions concerning for metastatic disease. Also with findings suggestive of underlying interstitial disease and mediastinal lymphadenopathy.    He's also reporting exertional dyspnea, non productive cough, LE edema, orthopnea over past few weeks to month. BNP was elevated at 2,640 up from 196 in 2020 when last checked and ECHO this admission showing EF 60-65% but suspected diastolic dysfunction and possibly R ventricular dysfunction raising possibility of volume overload contributing.    However became hypotensive, elevated lactate. Was given additional IVF and placed on midodrine, Rocephin/azithro changed to Zosyn on 2/11. Lactic downtrending now and BP stabilizing but complicated picture and management. Still appears volume overloaded but tolerating well so far, eventually plan for diuresis.     Hypotension  Pneumonia, likely atypical  Lactic acidosis w/suspected severe sepsis  -as above, complicated picture. Seems volume overloaded but lactic had been increasing and persistently hypotensive so started on midodrine and fluids 2/11 with improvement  -increase midodrine 10 TID for fluid sparing effect, if acutely worsens again consider pressor support  -NS at 75 additional 8 hours to ensure continued improved lactic  -broadened to Zosyn, suspected to be pulmonary source but clinically not with productive cough  -await pulm eval     Acute Hypoxic Respiratory Failure  -On adm, needing 2L to keep sats >90% and  requring up to 5L NC  -initially suspected 2/2 PNA and AE COPD but now concern for ILD and fluid overload contributing  -no wheezing on exam, cont nebs but decreased steroids to 40 BID IV-holding additional taper due to hypotension  -up to 5L today and seems to be getting more overloaded, await stabilization then plan for diruesis and further steroid taper eventually    Possible lung mass  Suspected malnutrition  -extensive smoking hx with recent significant weight loss now w/CT chest showing 5.3cm mass like opacity LLL which may represent pneumonia. However also with evidence of mediastinal lymphadenopathy, scattered sclerotic osseous lesions which could be related or CLL related  -oncology recommends repeat CT chest in 4-6 weeks once infection resolved to reassess  -await pulmonology eval on monday    Suspected prostate cancer with local mets to bone  -CT abd/pelvis with abnormal prostate findings as well as local slerotic bone lesions concerning for metastatic prostate cancer  -oncology consulted, IR consulted for biopsy of sclerotic lesions-possibly tomorrow  -nutrition team consulted    Suspected acute diastolic HF  -ECHO w/EF 60-65%, grade 1 diastolic dysfunction and possibly decreased R ventricular function  -presents with symptoms c/w CHF including orthopnea, exertional dyspnea, LE edema, dry cough with imaging/labs as above  -trial of diuresis on hold as above       Suspected COPD Exacerbation  Possible ILD  -Extensive smoking hx but no pfts on file, has since quit smoking  -CT with findings c/w underlying ILD. Does have hx in  and extensive outdoors exposure historically but not in past few years  -cont nebs, antibiotics, steroids and await pulmonology evaluation on Monday  -not wheezing currently, cont to wean steroids     CLL  thrombocytopenia  -Not on tx for is CLL outpatient but does follow with oncology  -platelets low, stop lovenox for now  -peripheral smear ordered by oncology     Essential  HTN  -home hydrochlorothiazide held, has been hypotensive as above     Diet: Combination Diet Regular Diet Adult    DVT Prophylaxis: Enoxaparin (Lovenox) SQ  Alfonso Catheter: Not present  Lines: None     Cardiac Monitoring: None  Code Status: Full Code      Disposition Plan   Await significant improvement, unclear timetable     Daniel Poe DO  Hospitalist Service  [facility]  Securely message with [employer] (more info)  Text page via [employer] Paging/Directory   ______________________________________________________________________    Interval History   Developed hypotension yesterday and given additional fluids, lactic peaked and is slowly improving. BP has stabilized. CT with findings concerning for prostate cancer now, discussed case with oncology    Physical Exam   Vital Signs: Temp: 98.4  F (36.9  C) Temp src: Oral BP: 110/72 Pulse: 77   Resp: 20 SpO2: 91 % O2 Device: Nasal cannula Oxygen Delivery: 5 LPM  Weight: 183 lbs 4.8 oz  Constitutional: awake, alert and cooperative, appears underweight  Eyes: pupils equal, round and reactive to light and conjunctiva normal  ENT: normocepalic, without obvious abnormality, atramatic  Respiratory: diffusely diminished with crackles and what sounds like a rub R>L but bilateral, no wheezing  Cardiovascular: regular rate and rhythm, no murmur noted and trace LE edema  GI: normal bowel sounds, soft and non-distended  Skin: chronic skin changes bilateral LE, no rash  Neurologic: alert, oriented x4, no focal deficits    65 MINUTES SPENT BY ME on the date of service doing chart review, history, exam, documentation & further activities per the note.      Data   ------------------------- PAST 24 HR DATA REVIEWED -----------------------------------------------    I have personally reviewed the following data over the past 24 hrs:    43.0 (H)  \   11.8 (L)   / 85 (L)     143 110 (H) 24.0 (H) /  99   4.1 22 1.09 \       Trop: N/A BNP: 3,239 (H)       Procal: N/A  CRP: N/A Lactic Acid: 3.1 (H)         Imaging results reviewed over the past 24 hrs:   Recent Results (from the past 24 hour(s))   CT Abdomen Pelvis w Contrast    Narrative    EXAM: CT ABDOMEN PELVIS W CONTRAST  LOCATION: [facility]  DATE/TIME: 2/11/2023 6:16 PM    INDICATION: Shortness of breath, respiratory failure, multifocal pneumonia, left lower lobe mass, sclerotic bone lesions suspicious for metastatic disease. CLL.  COMPARISON: 02/23/2020 abdominal and pelvic CT, chest CT dated 02/10/2023  TECHNIQUE: CT scan of the abdomen and pelvis was performed following injection of IV contrast. Multiplanar reformats were obtained. Dose reduction techniques were used.  CONTRAST: 92mL Isovue 370    FINDINGS:   LOWER CHEST: Small left pleural effusion, with extensive left lower lobe consolidation redemonstrated. Basilar changes of fibrosis.    HEPATOBILIARY: Cholelithiasis    PANCREAS: Unremarkable    SPLEEN: Splenomegaly reaching 14 cm, unchanged    ADRENAL GLANDS: Unremarkable    KIDNEYS/BLADDER: Incidental renal cysts for which no additional follow-up is recommended. 2 mm nonobstructing right mid renal calculus. Bladder is mildly thick-walled with several intramural diverticula compatible with chronic relative outlet   obstruction.    BOWEL: No bowel obstruction.    LYMPH NODES: No significant retroperitoneal adenopathy.    VASCULATURE: Atherosclerotic calcification without abdominal aortic aneurysm. 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    PELVIC ORGANS: Significant prostatic hypertrophy. There is some asymmetric hyperenhancement in the left peripheral zone (image 198 series 2). No free fluid.    MUSCULOSKELETAL: Old L2 compression fracture. Multiple sclerotic bone lesions, greatest in T10, L2, and L3.      Impression    IMPRESSION:   1.  Findings suspicious for primary prostatic malignancy with bone metastasis.  2.  Stable splenomegaly.  3.  Cholelithiasis.  4.  Additional  findings as above.

## 2023-02-12 NOTE — PLAN OF CARE
"Care from [id number]    Blood pressure 126/84, pulse 98, temperature 97  F (36.1  C), temperature source Oral, resp. rate 16, height 1.88 m (6' 2\"), weight 83.1 kg (183 lb 4.8 oz), SpO2 95 %.    Admit Date: 2/10/2023  Admitting Diagnosis: Respiratory failure. pneumonia  Neuro: Alert and Oriented x4  Activity: are SBA with no assistive devices   Telemetry Monitoring: No  Pain: denying pain.  Labs / Tests: Mag and K  GI: continent.   : continent. Pt ambulating to the bathroom. Using the bedside urinal over night.  Fluids: has Lactated Ringer's running at 75 mL per hour. Bolus of 0.9 NS given midnight.  Diet: Regular  Isolation: Contact MRSA  Consults: Hem/Onc, Pulmonary Consult, Respiratory  Treatment: Abx: zosyn, Fluids  Discharge Disposition: TBD    Sepsis triggered on previous shift.Lactic Acid being and BNP being monitored. 0600 lab draw results for Lactic Acid 1.8.    " Pt aware of urine test needed, states \"I do not have to go\"

## 2023-02-12 NOTE — PROVIDER NOTIFICATION
DATE:  2/12/2023   TIME OF RECEIPT FROM LAB:  0940  LAB TEST:  lactic  LAB VALUE:  3.2  RESULTS GIVEN WITH READ-BACK TO (PROVIDER): Jhoana BRANDT    TIME LAB VALUE REPORTED TO PROVIDER:   0953

## 2023-02-12 NOTE — PROGRESS NOTES
Pt had initial hypotension, tachycardia. Improved after 500 ml bolus over 2 hours. Pt requiring 4-6L NC. Pt alert and oriented x 4. Pt very adament about walking into bathroom. This writer suggested pt use urinal at bedside or external male catheter which patient refused. Pt was found after setting off bed alarm in bathroom without oxygen on. Pt refused reapplication of oxygen until back in bed where oxygen saturation was 78%. Fluids started LR at 75 ml/hr. Discharge pending.

## 2023-02-12 NOTE — PROGRESS NOTES
Cross cover update:  Notified by RN patient continues to be hypotensive blood pressure 87/57.  Also tachycardic with a rate of 117 bpm.  Afebrile.  Requiring 5 L O2 by nasal cannula, however, he is having conversations with family at bedside and feeling more short of breath with speaking.  At rest he denies dyspnea.    Patient was evaluated and appears stable.  Lung sounds are diminished but clear, no murmur noted, no JVD, no lower extremity edema.  His lactic acid improved slightly following of 500 mL fluid bolus.  Able to carry on conversation without difficulty.    Plan:  stat BNP, we will administer an additional fluid bolus 500 mL LR over 2 hours.  Repeat lactic acid following bolus, continue to monitor blood pressures.  Suspect patient is dry in general and may benefit from low rate maintenance IV infusion.  We will also switch his antibiotics over to Zosyn 4.5 g every 6 hours.      Discussed with Dr. Zhong who evaluated the patient and is in agreement with this plan.    Update (20:10):  Patient responding to fluid, BP and tachycardia improved. Remains afebrile.  BNP is mildly elevated from previous, Lactic Acid 3.4.  We will give him gentle fluid overnight and discontinue in the AM.  Repeat BNP and LA in the AM.

## 2023-02-12 NOTE — PROGRESS NOTES
DATE:  2/11/2023   TIME OF RECEIPT FROM LAB:  1850  LAB TEST:  Lactic   LAB VALUE:  4.6  RESULTS GIVEN WITH READ-BACK TO (PROVIDER):  Jeanne HIRSCH  TIME LAB VALUE REPORTED TO PROVIDER:   5680

## 2023-02-13 NOTE — PROGRESS NOTES
CrossCover:    Nursing staff alerted me that patient triggered septic protocol and lactic acid was rechecked with value of 4.  Patient is already known to be septic and on IV Zosyn for pneumonia.  I went to evaluate patient and he was sitting up in bed and reading a book.  He was very talkative without any complaints.  He feels about the same as yesterday.  He is not hypotensive or tachycardic and looks vitally stable.  I will not make any changes to his current antibiotic plan.

## 2023-02-13 NOTE — PLAN OF CARE
"Inpatient Note [id number]    Primary Problem: Pneumonia, ARF    VS: /70 (BP Location: Left arm)   Pulse 91   Temp 97.9  F (36.6  C) (Oral)   Resp 18   Ht 1.88 m (6' 2\")   Wt 83.1 kg (183 lb 4.8 oz)   SpO2 90%   BMI 23.53 kg/m        Orientation: A/O x4  Neuro: intact  Pain status: denies  Resp: Lung sounds diminished, dyspnea on exertion. O2 5LPM  Cardiac: WDL tachycardic  GI: Bowel sounds active. Last BM 2/11 per chart  : WDL, uses urinal, frequency  Skin: bruising, scattered  LDA: Peripheral IV to RAC, infusing Zosyn, otherwise SL  Pertinent Labs: Lactic 2.5 trending up to 3.8  Diet: regular, tolerating, self-chooses soft foods d/t lack of bottom dentures  Activity: SBA  Alarms/Safety: All alarms on and audible   Consults: nutrtition, pulmonology, respiratory   Discharge Plan: TBD       Chidi Montoya RN        "

## 2023-02-13 NOTE — PLAN OF CARE
Care from [id number]     Admitting Diagnosis: COPD, PNA  Neuro: Alert and Oriented x4 but forgetful  Activity: SBA, paced back and fourth in room   Telemetry Monitoring: No  Pain: denying pain.  Labs / Tests: WBC 43, CT Abd/Pelvis- suspicious prostate and bone malignancy. CT Cehst- Lung mass.  BC negative to date  GI:  WNL LBM 2/11  : WNL, using urinal   Resp: 5L O2 NC, LS diminished, [place]  Fluids: SL  Diet: Regular (soft foods)  Consults: Hem/Onc, Pulmonary Consult, Respiratory, Nutrition Consult   Treatment: Zosyn, increased Midodrine for for BPs. Solumedrol, Duonebs, Biopsy cancelled for now

## 2023-02-13 NOTE — CONSULTS
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  Chest imaging with emphysema, interstitial disease, and scattered consolidation with masslike consolidation of the left lower lobe.  The interstitial findings appear to be in a UIP pattern, though imaging is complicated by possible acute pneumonia versus metastatic disease.  Given his smoking history, RB ILD is a consideration.  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  This could represent infection or metastatic disease.  He reports approximately 37 pounds of weight loss over the past month and 1/2 to 2 months (220 pounds down to 183 pounds) which is certainly concerning for malignancy.  His infectious picture is difficult to assess.  He has leukocytosis but this may be due to the CLL.  He has worsening respiratory failure, possible pulmonary consolidations, and lactic acidosis, however he is afebrile and has a low procalcitonin as well as lower extremity edema and orthopnea.  His pulmonary signs and symptoms could be secondary to metastatic disease and volume overload rather than infection.  His lactic acidosis is out of proportion with his hypotension raising the concern for type  II possibly related to a metabolic derangement in the setting of possible malignancy.    -Will consider bronchoscopy for further assessment if not improving  -Consider reducing steroids given lack of current wheezing. Could try 40mg per day and assess for symptom change.      ANTIBIOTICS:  Zosyn 2/11-current  Ceftriaxone 2/10-2/11  Azithromycin 2/10-2/11      Dimitry Pulido M.D.  Pulmonary & Critical Care  Pager: Click Here to page    Pulmonary will continue to follow. We are in house at [facility] on Monday, Wednesday, and Friday. For assistance on other days, please page the on-call pulmonologist through [facility] or the .      I spent time: 65 minutes dedicated to his care so far today excluding procedures, including review of medical records, review of imaging (results & images), time with patient and time in documentation.         HPI:     Jaswant Sawyer is a 82 year old male with HO 77 pack years tobacco use with suspected COPD but no previous PFTs, CLL not on treatment, and hypertension who presented with shortness of breath admitted 2/10/2023 for respiratory failure suspected to be multifactorial from pneumonia and COPD with concern for left lower lobe mass and possible interstitial lung disease.    Over the past 3 to 4 weeks he has experienced worsening dyspnea and nonproductive cough.  He has had trouble with lower extremity edema for approximately a year, though this significantly worsened over the past few weeks.  He also reports orthopnea which has been present for several months.  He says he has noticed increased urination but also increased thirst and dry mouth prompting him to drink more.  He denies wheezing. BNP was elevated at 2640 on admission.  He has also experienced significant weight loss.  He reports he weighed approximately 220 pounds on his home scale 2 weeks ago, and is down to 183 today.           Past Medical History:     Past Medical History:   Diagnosis Date     Essential  hypertension     untreated     Tobacco abuse               Past Surgical History:      Past Surgical History:   Procedure Laterality Date     APPENDECTOMY       BLADDER SURGERY      bladder stone removal      TURP                Social History:     Social History     Socioeconomic History     Marital status: Single     Spouse name: Not on file     Number of children: Not on file     Years of education: Not on file     Highest education level: Not on file   Occupational History     Not on file   Tobacco Use     Smoking status: Former     Packs/day: 1.00     Types: Cigarettes     Smokeless tobacco: Not on file   Substance and Sexual Activity     Alcohol use: Not Currently     Drug use: Not on file     Sexual activity: Not on file   Other Topics Concern     Not on file   Social History Narrative     Not on file     Social Determinants of Health     Financial Resource Strain: Not on file   Food Insecurity: Not on file   Transportation Needs: Not on file   Physical Activity: Not on file   Stress: Not on file   Social Connections: Not on file   Intimate Partner Violence: Not on file   Housing Stability: Not on file               Family History:   Denies family history of malignancy         Allergies:   No Known Allergies             Medications:     Current Facility-Administered Medications   Medication     acetaminophen (TYLENOL) tablet 650 mg    Or     acetaminophen (TYLENOL) Suppository 650 mg     albuterol (PROVENTIL) neb solution 2.5 mg     [Held by provider] hydrochlorothiazide (MICROZIDE) capsule 12.5 mg     ipratropium - albuterol 0.5 mg/2.5 mg/3 mL (DUONEB) neb solution 3 mL     lidocaine (LMX4) cream     lidocaine 1 % 0.1-1 mL     melatonin tablet 1 mg     methylPREDNISolone sodium succinate (solu-MEDROL) injection 40 mg     midodrine (PROAMATINE) tablet 10 mg     ondansetron (ZOFRAN ODT) ODT tab 4 mg    Or     ondansetron (ZOFRAN) injection 4 mg     piperacillin-tazobactam (ZOSYN) intermittent infusion 4.5 g  "    sodium chloride (PF) 0.9% PF flush 3 mL     sodium chloride (PF) 0.9% PF flush 3 mL     tamsulosin (FLOMAX) capsule 0.4 mg               Review of Systems:     Review of Systems   Constitutional: Positive for weight loss.   Respiratory: Positive for cough and shortness of breath.               Exam:   /70 (BP Location: Left arm)   Pulse 91   Temp 97.9  F (36.6  C) (Oral)   Resp 18   Ht 1.88 m (6' 2\")   Wt 83.1 kg (183 lb 4.8 oz)   SpO2 90%   BMI 23.53 kg/m      Vitals:    02/10/23 1644 02/10/23 1807   Weight: 80.3 kg (177 lb) 83.1 kg (183 lb 4.8 oz)         Physical Exam  Vitals and nursing note reviewed.   Constitutional:       Appearance: He is normal weight.   Cardiovascular:      Rate and Rhythm: Normal rate and regular rhythm.      Heart sounds: No murmur heard.  Pulmonary:      Effort: Pulmonary effort is normal.      Comments: Rales in bilateral lower lobes  Abdominal:      Palpations: Abdomen is soft.   Skin:     General: Skin is warm and dry.   Neurological:      General: No focal deficit present.      Mental Status: He is alert and oriented to person, place, and time.                Data:   ROUTINE ICU LABS (Last four results)  CMP  Recent Labs   Lab 02/13/23  0601 02/12/23  0703 02/11/23  0538 02/10/23  1629 02/10/23  1250   NA  --  143 140  --  141   POTASSIUM  --  4.1 4.9  --  5.2   CHLORIDE  --  110* 109*  --  106   CO2  --  22 21*  --  22   ANIONGAP  --  11 10  -- 13   GLC  --  99 170*  --  143*   BUN  --  24.0* 15.3  --  11.1   CR 1.18* 1.09 1.06 1.11 1.11   GFRESTIMATED 62 68 71 67 67   REMI  --  8.8 8.8  --  8.9   MAG  --   --  2.0  --   --      CBC  Recent Labs   Lab 02/13/23  0601 02/12/23  0703 02/11/23  1932 02/11/23  1322 02/11/23  0538   WBC  --  43.0* 46.1* 41.8* 31.7*   RBC  --  3.58* 3.77* 3.94* 3.83*   HGB  --  11.8* 12.8* 13.2* 12.6*   HCT  --  37.9* 40.2 41.0 39.5*   MCV  --  106* 107* 104* 103*   MCH  --  33.0 34.0* 33.5* 32.9   MCHC  --  31.1* 31.8 32.2 31.9   RDW  -- "  18.7* 18.3* 18.0* 17.9*   PLT 79* 85* 92* 94* 79*     INFLAMMATIONNo lab results found in last 7 days.    Invalid input(s):  ESR    INR  Recent Labs   Lab 02/11/23  1322   INR 1.19*     Arterial Blood Gas  Recent Labs   Lab 02/10/23  1250   O2PER 4       ALL CULTURESNo results for input(s): CULT in the last 168 hours.           Imaging:     CT chest 2/10/2023  1.  Patchy bilateral opacities, suspicious for multifocal pneumonia.  2.  Left lower lobe masslike opacity measuring 5.3 cm, which may also represent pneumonia, although malignancy not excluded.  3.  Findings suggestive of underlying interstitial lung disease, likely usual interstitial pneumonia pattern.  4.  Small left pleural effusion.  5.  Mediastinal lymphadenopathy, favored to be metastatic rather than reactive.  6.  Scattered sclerotic osseous lesions, suspicious for metastatic disease.          The above note was dictated using voice recognition software and may include typographical errors. Please contact the author for any clarifications.

## 2023-02-13 NOTE — PROGRESS NOTES
[facility]    Medicine Progress Note - Hospitalist Service    Date of Admission:  2/10/2023    Assessment & Plan   82M with hx of suspected COPD (extensive smoking hx but no pfts on file), CLL (not on tx for this), and HTN presents with shortness of breath and found to have respiratory failure suspected to be due to PNA and AE COPD. However CXR was abnormal so CT was done showing patchy bilateral opacities suspicious for multifocal pneumonia and LLL mass like opacity 5.3cm with sclerotic lesions concerning for metastatic disease. Also with findings suggestive of underlying interstitial disease and mediastinal lymphadenopathy.    He's also reporting exertional dyspnea, non productive cough, LE edema, orthopnea over past few weeks to month. BNP was elevated at 2,640 up from 196 in 2020 when last checked and ECHO this admission showing EF 60-65% but suspected diastolic dysfunction and possibly R ventricular dysfunction raising possibility of volume overload contributing.    However became hypotensive, elevated lactate. Was given additional IVF and placed on midodrine, Rocephin/azithro changed to Zosyn on 2/11. Lactic downtrending overall but variable now and BP stabilizing but complicated picture and management. Still appears volume overloaded, fluids have been stopped and plan to start slow diruesis today. No wheezing, change IV steroids to oral prednisone for tomorrow     Hypotension  Pneumonia, likely atypical  Lactic acidosis w/suspected severe sepsis  -as above, complicated picture. Seems volume overloaded but lactic had been increasing and persistently hypotensive so started on midodrine and fluids 2/11 with improvement  -increase midodrine 10 TID for fluid sparing effect, if acutely worsens again consider pressor support  -broadened to Zosyn, suspected to be pulmonary source but clinically not with productive cough  -await pulm eval today, cough more productive now  -MAP acceptable, avoid  fluids if able    Acute Hypoxic Respiratory Failure  -On adm, needing 2L to keep sats >90% and requring up to 5L NC  -initially suspected 2/2 PNA and AE COPD but now concern for ILD and fluid overload contributing  -no wheezing on exam, cont nebs but decreased steroids to 40 BID IV-holding additional taper due to hypotension  -up to 5-6L today and seems to be getting more overloaded, start careful diuresis today and wean as able    Possible lung mass  Suspected malnutrition  -extensive smoking hx with recent significant weight loss now w/CT chest showing 5.3cm mass like opacity LLL which may represent pneumonia. However also with evidence of mediastinal lymphadenopathy, scattered sclerotic osseous lesions which could be related or CLL related  -oncology recommends repeat CT chest in 4-6 weeks once infection resolved to reassess  -await pulmonology eval on monday    Suspected prostate cancer with local mets to bone  -CT abd/pelvis with abnormal prostate findings as well as local slerotic bone lesions concerning for metastatic prostate cancer  -oncology consulted, IR initially consulted on Sunday for biopsy but cancelled by oncology due to respiratory issues  -nutrition team consulted    Suspected acute diastolic HF  -ECHO w/EF 60-65%, grade 1 diastolic dysfunction and possibly decreased R ventricular function  -presents with symptoms c/w CHF including orthopnea, exertional dyspnea, LE edema, dry cough with imaging/labs as above  -start lasix 20mg IV BID, systolic BP low/soft but MAP acceptable      Suspected COPD Exacerbation  Possible ILD  -Extensive smoking hx but no pfts on file, has since quit smoking  -CT with findings c/w underlying ILD. Does have hx in  and extensive outdoors exposure historically but not in past few years  -cont nebs, antibiotics, steroids and await pulmonology evaluation on Monday  -not wheezing currently, transition from IV solumedrol 40 BID to oral prednisone 40mg daily today and  continue taper     CLL  thrombocytopenia  -Not on tx for is CLL outpatient but does follow with oncology  -platelets low, stop lovenox for now  -peripheral smear ordered by oncology     Hx Essential HTN  -home hydrochlorothiazide held, has been hypotensive as above     Diet: Regular Diet Adult    DVT Prophylaxis: Enoxaparin (Lovenox) SQ  Alfonso Catheter: Not present  Lines: None     Cardiac Monitoring: None  Code Status: Full Code      Disposition Plan   Await significant improvement, likely another 2-3 days at least     Daniel Poe DO  Hospitalist Service  [facility]  Securely message with [employer] (more info)  Text page via [facility] Paging/Directory   ______________________________________________________________________    Interval History   Still with variable lactic acid levels, BP has improved with midodrine. Cough is becoming more productive now and still with dyspnea on exertion but no CP or fever.     Physical Exam   Vital Signs: Temp: 97.9  F (36.6  C) Temp src: Oral BP: 107/70 Pulse: 104   Resp: 20 SpO2: (!) 89 % O2 Device: Nasal cannula Oxygen Delivery: 5 LPM  Weight: 183 lbs 4.8 oz  Constitutional: awake, alert and cooperative, appears underweight  Eyes: pupils equal, round and reactive to light and conjunctiva normal  ENT: normocepalic, without obvious abnormality, atramatic  Respiratory: diffusely diminished with crackles bilaterally but R>L, no wheezing  Cardiovascular: regular rate and rhythm, no murmur noted and trace LE edema  GI: normal bowel sounds, soft and non-distended  Skin: chronic skin changes bilateral LE, no rash  Neurologic: alert, oriented x4, no focal deficits    45 MINUTES SPENT BY ME on the date of service doing chart review, history, exam, documentation & further activities per the note.      Data   ------------------------- PAST 24 HR DATA REVIEWED -----------------------------------------------    I have personally reviewed the following data over the past  24 hrs:    47.8 (H)  \   11.2 (L)   / 78 (L)     146 (H) 112 (H) 23.8 (H) /  173 (H)   4.3 16 (L) 1.16 \       Procal: N/A CRP: N/A Lactic Acid: 3.0 (H)         Imaging results reviewed over the past 24 hrs:   No results found for this or any previous visit (from the past 24 hour(s)).

## 2023-02-13 NOTE — CONSULTS
"CLINICAL NUTRITION SERVICES  -  ASSESSMENT NOTE      Recommendations:   - Diet per MD.  Offered Jaswant diet downgrade to mechanical soft since he is self-selecting IDDSI 5, 6 and sometimes pureed food items at times, but he politely declined and opted for self-selection and continuing to work with room service staff.  - Discussed ok for prn Ensure to use if begins skipping meals.     MALNUTRITION:  % Weight Loss:  > 10% in 6 months (severe malnutrition) --> based on patient report  % Intake:  Decreased intake does not meet criteria for malnutrition --> had decreased intake PTA but improved during admit  Subcutaneous Fat Loss:  Orbital region moderate depletion and Upper arm region moderate depletion   Muscle Loss:  Temporal region mild to moderate depletion, Clavicle bone region mild to moderate depletion and Acromion bone region mild to moderate depletion  Fluid Retention:  None noted or documented    Malnutrition Diagnosis: Moderate malnutrition  In Context of:  Acute illness or injury  Chronic illness or disease  Environmental or social circumstances          REASON FOR ASSESSMENT  Jaswant Sawyer is a 82 year old male seen by Registered Dietitian for Admission Nutrition Risk Screen for positive and Provider Order - \"possible cancer, reported significant weight loss last few months\".      PMH of: COPD, CLL.    Admit 2/2: Acute respiratory failure, sepsis, CAP, COPD exacerbation.    NUTRITION HISTORY  - Information obtained from patient and chart.  - Diet at home: Self-selecting soft solids.  Per his report he has been having issues with his teeth and getting dentures (currently edentulous?) and therefore had a major change in his diet, which he feels resulted in his weight loss.    - Usual intakes: Meals BID-TID.  - Use of oral supplements: None PTA.  - Allergies: NKFA.      CURRENT NUTRITION ORDERS  Diet Order:     Regular    Current Intake/Tolerance:  100% intakes per flowsheet review thus far but overall limited " "timeframe and recordings.  Has consistently been ordering meals.  Jaswant reports he has been working with the call center to receive modified textures (ordering IDDSI 5 and 6 items) per his request because of his inability to chew solids.  Offered diet downgrade to mechanical soft but he politely declined and opted to continue working with the call center to receive meals which he has already been ordering.        Obtained from Chart/Interdisciplinary Team:  - Oncology consulted given concern for metastatic disease  - No documentation of PI  - Stooling patterns reviewed  - Requiring 5 L NC    ANTHROPOMETRICS  Height: 6' 2\"  Weight: 183 lbs 4.8 oz  Body mass index is 23.53 kg/m .  Weight Status:  Normal BMI  Weight History:  Wt Readings from Last 10 Encounters:   02/10/23 83.1 kg (183 lb 4.8 oz)   02/23/20 101.7 kg (224 lb 3.2 oz)   07/12/19 99.2 kg (218 lb 12.8 oz)   07/02/19 101.2 kg (223 lb 1.7 oz)     - No current documentation of edema.    - No recent wt trends on file (care everywhere reviewed).    - Patient himself reports a baseline/UBW of 220# and feels like he lost from this weight over the past \"6 months\".  Would indicate 17% wt loss in this reported timeframe.     LABS  Labs reviewed.      MEDICATIONS  Medications reviewed.      ASSESSED NUTRITION NEEDS PER APPROVED PRACTICE GUIDELINES:    Dosing Weight 83 kg  Estimated Energy Needs: 25-30 Kcal/Kg  Justification: maintenance  Estimated Protein Needs: 1-1.2 g pro/Kg  Justification: preservation of lean body mass  Estimated Fluid Needs: per MD      NUTRITION DIAGNOSIS:  Inadequate oral intake related to increased needs associated with COPD exacerbation and possible new malignancy + chewing difficulty and  as evidenced by report of severe wt loss, fat/muscle loss.    NUTRITION INTERVENTIONS  Recommendations / Nutrition Prescription  See above.      Implementation  Nutrition education: Provided education on above.    Medical Food Supplement: As above. "     Nutrition Goals  Patient to consume at least 50-75% of meals or supplements TID.       MONITORING AND EVALUATION:  Progress towards goals will be monitored and evaluated per protocol and Practice Guidelines          Jewell Flores RDN, LD  Clinical Dietitian  3rd floor/ICU: [phone]  All other floors: [phone]  Weekend/holiday: [phone]  Office: [phone]

## 2023-02-13 NOTE — PROGRESS NOTES
Lactic acid trending down to 2.5.  Multiple lactic acid levels checked over the last 12 hours with peak of 4.0 and has been trending down since.  He is here with pneumonia that has been improving with broad-spectrum antibiotics.  No fluid boluses earlier and lactic acid has trended down.  Blood pressure 110/71 with heart rate 95.  Receiving 4 times daily DuoNebs.  -No fluid boluses or changed antibiotics

## 2023-02-13 NOTE — PLAN OF CARE
"Blood pressure 110/71, pulse 95, temperature 97.3  F (36.3  C), temperature source Oral, resp. rate 22, height 1.88 m (6' 2\"), weight 83.1 kg (183 lb 4.8 oz), SpO2 91 %.    Pt VV on 5 LPM O2 via NC. Dyspnea with exertion noted. Pt reported that he has coccyx pain from lying in one position for a while. Pt was encouraged to keep using pillows to offload weight in bed. Pt stated that he sleeps on his recliner at home due to having difficulties breathing while lying down. Assessed skin on buttocks which has non blanchable redness and a scab on left buttock. Patient stated that the skin openned at home and he picked on it. Barrier cream applied . He is using bedside urinal quiet frequently. PCDs applied  and transitioned patient to oxymask d/t mouth breathing.   "

## 2023-02-13 NOTE — PROGRESS NOTES
"[facility]     Date: 2/13/2023  Admission Dx: Pneumonia  Pulmonary History suspected COPD due to extensive smoking history.   Home Nebulizer/MDI Use: none  Home Oxygen: none  Acuity Level (RCAT flow sheet): 3  Aerosol Therapy initiated: continue with Duoneb QID, albuterol Q2 prn      Pulmonary Hygiene initiated: deep breath and coughing techniques      Volume Expansion initiated: IS      Current Oxygen Requirements: 5 lpm NC  Current SpO2: 89%-91%    Re-evaluation date: 2/16/2023    Patient Education: ongoing education on indications and benefits of bronchodilators.       See \"RT Assessments\" flow sheet for patient assessment scoring and Acuity Level Details.             "

## 2023-02-13 NOTE — PLAN OF CARE
"Care from [id number]    Blood pressure 108/74, pulse 92, temperature 97.5  F (36.4  C), temperature source Oral, resp. rate 18, height 1.88 m (6' 2\"), weight 83.1 kg (183 lb 4.8 oz), SpO2 91 %.    Admit Date: 2/10/2023  Admitting Diagnosis: Respiratory failure. pneumonia  Neuro: Alert and Oriented x4  Activity: are SBA with no assistive devices   Telemetry Monitoring: No  Pain: denying pain.  Labs / Tests: Lactic, mag, k  GI: WNL   : Frequency. Voiding on the bedside urinal.  Fluids: is Saline locked.  Diet: NPO  Consults:   Treatment: Abx: zosyn  Discharge Disposition:    Pt triggered sepsis, lactic acid results were 2.5. provider was notified. See notes.   5 Liters on oxymask  "

## 2023-02-13 NOTE — PROVIDER NOTIFICATION
DATE:  2/13/2023   TIME OF RECEIPT FROM LAB:  0230  LAB TEST:  Lactic Acid  LAB VALUE: 2.5  RESULTS GIVEN WITH READ-BACK TO (PROVIDER):  Data Unavailable, cross cover    TIME LAB VALUE REPORTED TO PROVIDER:   0309

## 2023-02-13 NOTE — PROGRESS NOTES
DATE:  2/12/2023   TIME OF RECEIPT FROM LAB:  2115  LAB TEST:  Lactic Acid  LAB VALUE:  4.0  RESULTS GIVEN WITH READ-BACK TO (PROVIDER):  Maisha HIRSCH  TIME LAB VALUE REPORTED TO PROVIDER:   2120

## 2023-02-13 NOTE — PROGRESS NOTES
Oncology:     Chart check: Admitted for respiratory distress and found to have pneumonia, imaging concerning for metastatic disease including sclerotic lesions.  Likely prostate etiology with elevated PSA.  Initial plan for bone biopsy, but given his respiratory status has been poor, plan for pulmonology to see today and biopsy canceled.    SPEP and peripheral smear pending.    Patient has history of CLL and follows with [facility] oncology team.  Okay to get bone biopsy as an outpatient through his oncology team.  We will follow peripherally.  Please page with any questions      Delmy Khanna PA-C  Hematology/Oncology  [facility]  [phone] (p)

## 2023-02-14 NOTE — PROGRESS NOTES
[facility]    Hematology/Oncology Follow-up Note      Today's Date: 02/14/23  Date of Admission:  2/10/2023  Reason for Consult: Concern for metastatic cancer, including sclerotic lesions      ASSESSMENT/PLAN:  Jaswant Sawyer is a 82 year old male with:    Suspected prostate cancer with bony lesions: Multiple sclerotic lesions found on CT chest on 2/10/2023.  PSA elevated to 53.10, highly suspicious for metastatic prostate cancer.  Initially requested bone biopsy, but respiratory status has been tenuous, therefore biopsy canceled.  It would be unlikely for metastatic prostate cancer to be causing significant respiratory distress, but if treatment is warranted, we would likely treat with hormonal therapy with Casodex/Lupron, but hold for now.    Pneumonia: Oxygen requirement has increased in the last 12 to 24 hours from 5 L to 8 L and CT chest with emphysema, interstitial disease and pneumonia.  Pulmonology consulted yesterday and considering bronchoscopy if no improvement.    CLL: Followed by [facility] and not currently on treatment. Last Onc visit was 1+ year ago. WBC rising and platelets dropping, likely reactive, but could be transformation of CLL.  If bronchoscopy performed by pulmonology, may be able to obtain biopsy. Recommend daily CBC with diff.    Discussed with Dr Sexton and Dr Joshi.       INTERIM HISTORY: Jaswant was seen in his room, resting comfortably in bed, family was not at bedside.  Oxygen in place.  He is actually feeling pretty decent.  Breathing has worsened over the last 24 hours, but holds a conversation pretty well.  I was able to speak to the son over the phone as well.       MEDICATIONS:  Current Facility-Administered Medications   Medication     acetaminophen (TYLENOL) tablet 650 mg    Or     acetaminophen (TYLENOL) Suppository 650 mg     albuterol (PROVENTIL) neb solution 2.5 mg     [Held by provider] furosemide (LASIX) injection 20 mg     ipratropium - albuterol  "0.5 mg/2.5 mg/3 mL (DUONEB) neb solution 3 mL     lidocaine (LMX4) cream     lidocaine 1 % 0.1-1 mL     melatonin tablet 1 mg     midodrine (PROAMATINE) tablet 10 mg     ondansetron (ZOFRAN ODT) ODT tab 4 mg    Or     ondansetron (ZOFRAN) injection 4 mg     piperacillin-tazobactam (ZOSYN) intermittent infusion 4.5 g     predniSONE (DELTASONE) tablet 40 mg     sodium chloride (PF) 0.9% PF flush 3 mL     sodium chloride (PF) 0.9% PF flush 3 mL     tamsulosin (FLOMAX) capsule 0.4 mg           ALLERGIES:  No Known Allergies      PHYSICAL EXAM:  Vital signs:  Temp: 97.6  F (36.4  C) Temp src: Axillary BP: 114/75 Pulse: 106   Resp: 18 SpO2: (!) 89 % O2 Device: Oxymask Oxygen Delivery: 8 LPM Height: 188 cm (6' 2\") Weight: 83.1 kg (183 lb 4.8 oz)  Estimated body mass index is 23.53 kg/m  as calculated from the following:    Height as of this encounter: 1.88 m (6' 2\").    Weight as of this encounter: 83.1 kg (183 lb 4.8 oz).    GENERAL:  Male, in no acute distress.  Alert. Well groomed.   HEENT:  Normocephalic, atraumatic.   LUNGS: O2 in place, speaks full sentences with some tachypnea.   MSK: Full ROM UE.    SKIN: No rash on exposed skin.   NEURO: CN grossly intact, speech normal  PSYCH: Calm and cooperative      LABS:  CBC RESULTS:   Recent Labs   Lab Test 02/14/23 0448   WBC 73.4*   RBC 3.57*   HGB 11.8*   HCT 37.9*   *   MCH 33.1*   MCHC 31.1*   RDW 20.6*   PLT 77*       Recent Labs   Lab Test 02/14/23 0448 02/13/23  1007   * 146*   POTASSIUM 4.1 4.3   CHLORIDE 109* 112*   CO2 24 16*   ANIONGAP 14 18*   GLC 95 173*   BUN 28.8* 23.8*   CR 1.53* 1.16   REMI 8.7* 8.9         Delmy Khanna PA-C  Hematology/Oncology  [facility]      "

## 2023-02-14 NOTE — PLAN OF CARE
Temp: 98.4  F (36.9  C) Temp src: Axillary BP: 106/69 Pulse: 111   Resp: 20 SpO2: 91 % O2 Device: Oxymask Oxygen Delivery: 7 LPM   Pt sating in upper 80s on 5L, O2 increasted to 7L pt now sating at 90-91 via oxymask    A&Ox4, up SBA pt stands to use the urinal, pcds on. Denied pain. Pt sob at rest and with activity- per pt he has not seen improvement with this. Saline locked. Pt did trigger sepsis x2 from [id number], lactic 3.3- provider notified no intervention needed verbal order to not recheck lactic overnight if pt were to trigger sepsis as lactic has been elevated and with pt getting lasix every 12 hours, IVF contraindicated. Plan- midodrine increased to 10mg tid, lasix every 12 hours, zosyn every 6 hours, prednisone daily, respiratory following-nebs scheduled 4x daily, pulmonology following, hem/onc following, strict I&Os, encourage IS use, mag/potassium protocol- potassium recheck in am, mag level ordered- result pending.

## 2023-02-14 NOTE — PROGRESS NOTES
RT note: placed on HFNC 25 lpm 50%, verbal okay from MD. Pt had increased SOB and nose bleed. Pt tachypneic at times. Receiving duoneb QID.

## 2023-02-14 NOTE — PHARMACY-VANCOMYCIN DOSING SERVICE
"Pharmacy Vancomycin Initial Note  Date of Service 2023  Patient's  1940  82 year old, male    Indication: Healthcare-Associated Pneumonia    Current estimated CrCl = Estimated Creatinine Clearance: 44.5 mL/min (A) (based on SCr of 1.53 mg/dL (H)).    Creatinine for last 3 days  2023:  7:03 AM Creatinine 1.09 mg/dL  2023:  6:01 AM Creatinine 1.18 mg/dL; 10:07 AM Creatinine 1.16 mg/dL  2023:  4:48 AM Creatinine 1.53 mg/dL    Recent Vancomycin Level(s) for last 3 days  No results found for requested labs within last 72 hours.      Vancomycin IV Administrations (past 72 hours)      No vancomycin orders with administrations in past 72 hours.                Nephrotoxins and other renal medications (From now, onward)    Start     Dose/Rate Route Frequency Ordered Stop    02/15/23 1100  vancomycin (VANCOCIN) 1000 mg in dextrose 5% 200 mL PREMIX         1,000 mg  200 mL/hr over 1 Hours Intravenous EVERY 24 HOURS 23 1050      23 1100  vancomycin (VANCOCIN) 1,500 mg in 0.9% NaCl 250 mL intermittent infusion         1,500 mg  over 90 Minutes Intravenous ONCE 23 1049      23 1230  [Held by provider]  furosemide (LASIX) injection 20 mg        (Held by provider since 2023 at 0948 by Marshal Joshi MD.Hold Reason: Acute Kidney Injury)    20 mg  over 1-3 Minutes Intravenous EVERY 12 HOURS 23 1229      23 2030  piperacillin-tazobactam (ZOSYN) intermittent infusion 4.5 g        Note to Pharmacy: For [facility], [facility] and [facility]: For Zosyn-naive patients, use the \"Zosyn initial dose + extended infusion\" order panel.    4.5 g  200 mL/hr over 30 Minutes Intravenous EVERY 6 HOURS 23            Contrast Orders - past 72 hours (72h ago, onward)    Start     Dose/Rate Route Frequency Stop    23 1830  iopamidol (ISOVUE-370) solution 500 mL         500 mL Intravenous ONCE 23 1808          [employer] Prediction of Planned Initial Vancomycin " Regimen  Loading dose: 1500 mg at 11:00 02/14/2023.  Regimen: 1000 mg IV every 24 hours.  Start time: 10:50 on 02/14/2023  Exposure target: AUC24 (range)400-600 mg/L.hr   AUC24,ss: 479 mg/L.hr  Probability of AUC24 > 400: 70 %  Ctrough,ss: 15.4 mg/L  Probability of Ctrough,ss > 20: 26 %  Probability of nephrotoxicity (Lodise KENA 2009): 11 %          Plan:  1. Start vancomycin  1,500 mg IV loading dose, then 1,000 mg IV q24h.   2. Vancomycin monitoring method: AUC  3. Vancomycin therapeutic monitoring goal: 400-600 mg*h/L  4. Pharmacy will check vancomycin levels as appropriate in 1-3 Days.    5. Serum creatinine levels will be ordered daily for the first week of therapy and at least twice weekly for subsequent weeks.      Gini Camejo [facility]

## 2023-02-14 NOTE — PROGRESS NOTES
Report received from JUS Dhaliwal. Patient ate full breakfast at 1000. Anesthesia is aware and states the case will need to be pushed back until at least 1800 tonight, or added to tomorrow's schedule due to risk of aspiration. OR called and updated.

## 2023-02-14 NOTE — PROVIDER NOTIFICATION
DATE:  2/13/2023   TIME OF RECEIPT FROM LAB: 2120  LAB TEST:  Lactic   LAB VALUE:  3.3  Sent web based page with results to cross cover  TIME LAB VALUE REPORTED TO PROVIDER:   2130

## 2023-02-14 NOTE — PLAN OF CARE
CARE FROM 2300 - 0700:    Pt A&Ox4. Denies pain. VSS ex HR and O2 requirements. At rest, pts HR , with activity (standing at bedside to use urinal), HR increases to 130s, denies and palpitations or feeling that his heart is racing. At rest, pt O2 sats 90-93% on 8L O2 via oxymask, with same activity noted above, O2 sats drop to 78-84% and stay in that range for approx 5 minutes after pt gets back in bed, at which point he's able to return to 90-93%. Pt appears quite dyspneic with this amount of activity, but states it does not feel any worse or better than when he came in. Pt up frequently overnight to void, strict I&O monitored. PIV SL between doses of IV zosyn. Receiving lasix IV BID; midodrine TID. IS use performed x5 reps, level incentive spirometer achieved [id number], pt appeared to tolerate well, reinforced correct usage of IS device. Pt has intermittent cough, reporting he produces white phlegm/sputum about 4-5 times per day. Instructed pt to provide sputum sample when able. Pt o/w only complains of frequently needing to void, and very dry mouth (reports this has been ongoing for several months). Pulmonology and Heme/Onc following.

## 2023-02-14 NOTE — PLAN OF CARE
"Inpatient Note [id number]    Primary Problem: Pneumonia, ARF    VS: /75 (BP Location: Left arm)   Pulse 106   Temp 97.6  F (36.4  C) (Axillary)   Resp 18   Ht 1.88 m (6' 2\")   Wt 83.1 kg (183 lb 4.8 oz)   SpO2 (!) 89%   BMI 23.53 kg/m        Orientation: A/O x4  Neuro: intact  Pain status: denies  Resp: Lung sounds diminished, dyspnea on exertion. HFNC 25 LPM 50%. See RT note.  Cardiac: WDL tachycardic  GI: Bowel sounds active. Last BM 2/11 per chart  : WDL, uses urinal, frequency  Skin: bruising, scattered  LDA: Peripheral IV to RAC, 0.45% cont. Zosyn and Vanco infusions.   Pertinent Labs: Lactic acid  Diet: regular, tolerating, self-chooses soft foods d/t lack of bottom dentures  Activity: SBA, stands to use urinal  Alarms/Safety: All alarms on and audible   Consults: nutrtition, pulmonology, respiratory   Discharge Plan: TBD       Chidi Montoya RN        "

## 2023-02-14 NOTE — PROGRESS NOTES
[facility]    Medicine Progress Note - Hospitalist Service    Date of Admission:  2/10/2023    Assessment & Plan   82M with hx of suspected COPD (extensive smoking hx but no pfts on file), CLL (not on tx for this), and HTN presents with shortness of breath and found to have respiratory failure suspected to be due to PNA and COPD exacerbation. CXR/CT showed patchy bilateral opacities suspicious for multifocal pneumonia and LLL mass like opacity 5.3cm with sclerotic lesions concerning for metastatic disease. Also with findings suggestive of underlying interstitial disease and mediastinal lymphadenopathy. Possibly prostate origin. BNP elevated, patient diureseed, then given IVF for hypotension, Rocephin/azithro changed to Zosyn on 2/11. IV steroids changed to oral. Has increasing O2 needs, increasing Cr and hypernatremia. Possible bronch tomorrow.      Acute hypoxic respiratory failure    - unclear cause    - Pneumonia vs COPD with exacerbation vs interstitial lung disease vs acute diastolic heart failure vs secondary to metastatic disease    - Pulmonary following: I have paged them to confirm bronchoscopy tomorrow    - currently on oxymask at 8L    Pneumonia, likely atypical  Lactic acidosis w/suspected severe sepsis    - initially treated with Aith/ceftriaxone, changed to zosyn on 2/11    - has history of MRSA    - add vanco     - MRSA swab of nares (not vanco if negative)    Possible COPD exacerbation    - started IV solumedrol on admission, changed to oral prednisone on 2/14    - on scheduled duonebs    - never had significant wheezing so less likely COPD as cause of hypoxia    - stop steroids    Possible acute diastolic CHF    - patient has been given IVF and then was diuresed    - based on elevated BNP/possible CHF on CXR (more likely infectious on CT), was diuresed with scheduled lasix 20mg IV BID    - ECHO w/EF 60-65%, grade 1 diastolic dysfunction and possibly decreased R ventricular  function    - In/Out indicate patient is -9L (I doubt this is accurate as intake is recorded at 640 total)    - now with MILADYS and elevated NA    - stop (hold) lasix    - was started on midodrine to allow for diuresis, will hold for now    - will give .45NS at 100cc/hr x 1 liter    Lung mass/Mediastinal lymphadenopathy    - extensive smoking hx with recent significant weight loss now w/CT chest showing 5.3cm mass like opacity LLL which may represent pneumonia    - also with evidence of mediastinal lymphadenopathy, scattered sclerotic osseous lesions: CLL, metastatic prostate CA?    - spoke with Pulmonary    - to have bronch today or tomorrow    Suspected prostate cancer with local mets to bone    - CT abd/pelvis with abnormal prostate findings as well as local slerotic bone lesions concerning for metastatic prostate cancer    - oncology consulted, IR initially consulted on Sunday for biopsy but cancelled by oncology due to respiratory issues    - Spoke with Oncology: no plans for chemo given respiratory status, may start casodex or Lupron      Suspected COPD Exacerbation  Possible ILD    - extensive smoking hx but no pfts on file, has since quit smoking    - CT with findings c/w underlying ILD    - does have hx in  and extensive outdoors exposure historically but not in past few years    - plan as above     CLL  thrombocytopenia    - not on tx for is CLL outpatient but does follow with oncology    - platelets low: holding ASA and not on DVT prophylaxis    - stop lovenox for now    - peripheral smear ordered by oncology: done and in chart     Hx Essential HTN    - home hydrochlorothiazide held, has been hypotensive as above    Double vision    - since this am    - possibly due to hypoxia    - would proceed with bronch and then re-assess    Called and updated son Jaswant     Diet: Regular Diet Adult  Snacks/Supplements Adult: Ensure Enlive; With Meals    DVT Prophylaxis: scds  Alfonso Catheter: Not present  Lines:  None     Cardiac Monitoring: None  Code Status: Full Code      Disposition Plan   Await significant improvement, likely another 2-3 days at least     Marshal Joshi MD  Hospitalist Service  [facility]  Securely message with [employer] (more info)  Text page via [employer] Paging/Directory   ______________________________________________________________________    Interval History    Patient states his mouth is dry. Has been having some double vision this am. No chest pain, abdo pain, n/v  Physical Exam   Vital Signs: Temp: 97.6  F (36.4  C) Temp src: Axillary BP: 114/75 Pulse: 106   Resp: 18 SpO2: (!) 89 % O2 Device: Oxymask Oxygen Delivery: 8 LPM  Weight: 183 lbs 4.8 oz  Constitutional: awake, alert and cooperative, appears underweight  Eyes: pupils equal, round and reactive to light and conjunctiva normal  ENT: normocepalic, without obvious abnormality, atramatic  Respiratory: coarse crackles bilaterally.Good air movement. No wheezing  Cardiovascular: regular rate and rhythm, no murmur noted and trace LE edema  GI: normal bowel sounds, soft and non-distended  Skin: chronic skin changes bilateral LE, no rash  Neurologic: alert, oriented x4, no focal deficits    45 MINUTES SPENT BY ME on the date of service doing chart review, history, exam, documentation & further activities per the note.      Data   ------------------------- PAST 24 HR DATA REVIEWED -----------------------------------------------    I have personally reviewed the following data over the past 24 hrs:    73.4 (HH)  \   11.8 (L)   / 77 (L)     147 (H) 109 (H) 28.8 (H) /  95   4.1 24 1.53 (H) \       Procal: N/A CRP: N/A Lactic Acid: 1.9         Imaging results reviewed over the past 24 hrs:   No results found for this or any previous visit (from the past 24 hour(s)).

## 2023-02-14 NOTE — PROVIDER NOTIFICATION
"DATE:  2/14/2023   TIME OF RECEIPT FROM LAB:  0600  LAB TEST:  WBC  LAB VALUE:  73.4  RESULTS GIVEN WITH READ-BACK TO (PROVIDER):  Hospitalist X-Cover Pager: \"Critical lab/FYI: WBC 73.4, up from 47.8 yesterday.\"  TIME LAB VALUE REPORTED TO PROVIDER:   0615    "

## 2023-02-15 NOTE — DEATH PRONOUNCEMENT
MD DEATH PRONOUNCEMENT    Called to pronounce Jaswant Sawyer dead.    Physical Exam: Unresponsive to noxious stimuli, Spontaneous respirations absent, Breath sounds absent, Carotid pulse absent, Heart sounds absent and Pupillary light reflex absent, not organized electrical activity on cardiac telemetry and no waveform on pulse oximetry.    Patient was pronounced dead at 15: 54 PM, February 15, 2023.    Preliminary Cause of Death: Cardiac arrest      Principal Problem:    Pneumonia  Active Problems:    Respiratory failure (H)  CLL  Probable prostate malignancy    Infectious disease present?: YES    Communicable disease present? (examples: HIV, chicken pox, TB, Ebola, CJD) :  NO    Multi-drug resistant organism present? (example: MRSA): Possibly.  The patient has a history of MRSA.  Initial MRSA nasal swab was negative.    Please consider an autopsy if any of the following exist:  YES Unexpected or unexplained death during or following any dental, medical, or surgical diagnostic treatment procedures.   NO Death of mother at or up to seven days after delivery.     NO All  and pediatric deaths.     NO Death where the cause is sufficiently obscure to delay completion of the death certificate.   NO Deaths in which autopsy would confirm a suspected illness/condition that would affect surviving family members or recipients of transplanted organs.     The following deaths must be reported to the 's Office:  YES A death that may be due entirely or in part to any factors other than natural disease (recent surgery, recent trauma, suspected abuse/neglect).   NO A death that may be an accident, suicide, or homicide.     NO Any sudden, unexpected death in which there is no prior history of significant heart disease or any other condition associated with sudden death.   NO A death under suspicious, unusual, or unexpected circumstances.    NO Any death which is apparently due to natural causes but in which the   does not have a personal physician familiar with the patient s medical history, social, or environmental situation or the circumstances of the terminal event.   NO Any death apparently due to Sudden Infant Death Syndrome.     NO Deaths that occur during, in association with, or as consequences of a diagnostic, therapeutic, or anesthetic procedure.   NO Any death in which a fracture of a major bone has occurred within the past (6) six months.   NO A death of persons note seen by their physician within 120 days of demise.     NO Any death in which the  was an inmate of a public institution or was in the custody of Law Enforcement personnel.   NO  All unexpected deaths of children   NO Solid organ donors   NO Unidentified bodies   NO Deaths of persons whose bodies are to be cremated or otherwise disposed of so that the bodies will later be unavailable for examination;   NO Deaths unattended by a physician outside of a licensed healthcare facility or licensed residential hospice program   NO Deaths occurring within 24 hours of arrival to a health care facility if death is unexpected.    NO Deaths associated with the decedent s employment.   NO Deaths attributed to acts of terrorism.   NO Any death in which there is uncertainty as to whether it is a medical examiner s care should be discussed with the medical investigator.      The medical examiner's office was contacted and the case was discussed.  They determined that no autopsy or report is necessary.      Body disposition: Autopsy was discussed with family member:  Son in person.  Permission for autopsy was deferred.  The patient's son Jaswant will call tomorrow () to let us know if they would like to pursue autopsy    Dimitry Pulido M.D.  Pulmonary & Critical Care  Pager: Click Here to page

## 2023-02-15 NOTE — PROGRESS NOTES
"Observation unit cross cover note:    Nurse reports patient is intermittently tachycardic with exertion and patient stated that he felt like his heart stopped and that he \" for a few seconds.\"  Denies chest pain, or new increasing shortness of breath.    - ordered EKG  - ordered cardiac telemetry     CECY Arredondo PA-C on 2023 at 10:16 PM    "

## 2023-02-15 NOTE — PROGRESS NOTES
1440's, pt arrived to ICU remains intubated post bronchoscopy.     ETT size 9.0, 25 at the gum.  CXR confirmed good ETT placement.  Placed pt on initial vent settings as noted below:    Vent Mode: CMV/AC  (Continuous Mandatory Ventilation/ Assist Control)  FiO2 (%): 85 %  Resp Rate (Set): 12 breaths/min  Tidal Volume (Set, mL): 500 mL  PEEP (cm H2O): 8 cmH2O  Resp: (!) 47    BS-course. Suctioned via ETT for a moderate amount of blood-tinged secretion.    1510: pt code blue. Chest compressions started with Lucus. RT assisted with ventilation via ambu bag with PEEP valve, RQ pod and ETco2 monitoring inline t/o code.    1554: code called per MD.  Refer to code flow sheet for more detail.

## 2023-02-15 NOTE — PROGRESS NOTES
Oncology:     Chart check:     Admitted with pneumonia and increasing oxygen requirement in the last 24 hours.  CT with sclerotic lesions and elevated PSA, suspicious for metastatic prostate cancer.  Initial bone biopsy discussed, but respiratory status has been tenuous, therefore biopsy canceled.  Discussed this with the patient yesterday.      Pulmonology is following and plan for bronchoscopy today.  History of CLL and if biopsy is possible during bronchoscopy, that would be helpful. Leukocytosis and thrombocytopenia ongoing, but stable today.  Recommend daily CBC with differential.    No new oncology recommendations today, but please do not hesitate to call us with any questions.    Delmy Khanna PA-C  Hematology/Oncology  [facility]

## 2023-02-15 NOTE — CODE/RAPID RESPONSE
Code blue called at approximately 1510.     The patient had recently arrived from the OR for bronchoscopy and remained intubated.  He was borderline hypotensive requiring low-dose norepinephrine but initially appeared to be stable.  During the placement of a Alfonso catheter it was observed that his heart rate was slowing dramatically and he was found to be pulseless, thus a CODE BLUE was called.  Manual compressions were started immediately and the Oumar device was attached within a few minutes of initiation of the code.  He was found to be in PEA.  An IO was placed in the left lower extremity.  He was treated with epinephrine x3, bicarb x3, calcium chloride x1, magnesium 2 g, as well as a norepinephrine drip at 0.6 and then epinephrine drip at 0.3.  During the first few pulse checks he was found to have a weak pulse that disappeared between 30 to 60 seconds after cessation of compressions.  After the first few pulse checks, he was pulseless on subsequent pulse checks.  Mr. Sawyer's family was invited into the room for the last portion of the code to give them an opportunity to see the patient.  During the final pulse check at 1554 he was again found to be pulseless and the code was stopped and the patient was pronounced dead.    Critical care time 44 minutes excluding procedures.    Dimitry Pulido M.D.  Pulmonary & Critical Care  Pager: Click Here to page

## 2023-02-15 NOTE — PROGRESS NOTES
[facility]    Medicine Progress Note - Hospitalist Service    Date of Admission:  2/10/2023    Assessment & Plan   82M with hx of suspected COPD (extensive smoking hx but no pfts on file), CLL (not on tx for this), and HTN presents with shortness of breath and found to have respiratory failure suspected to be due to PNA and COPD exacerbation. CXR/CT showed patchy bilateral opacities suspicious for multifocal pneumonia and LLL mass like opacity 5.3cm with sclerotic lesions concerning for metastatic disease. Also with findings suggestive of underlying interstitial disease and mediastinal lymphadenopathy. Possibly prostate origin. BNP elevated, patient diureseed, then given IVF for hypotension, Rocephin/azithro changed to Zosyn on 2/11. IV steroids changed to oral. Has increasing O2 needs, increasing Cr and hypernatremia. Currently discontinued diuretics and steroids. Changed nebs to PRN. Hypoxia likely due to possible underlying malignancy +/- PNA +/- interstitial lung disease. Bronch today     Acute hypoxic respiratory failure    - unclear cause    - Pneumonia vs COPD with exacerbation vs interstitial lung disease vs acute diastolic heart failure vs secondary to metastatic disease    - more likely not COPD or CHF    - Pulmonary following: bronchoscopy today    - currently on 35 L high flow nasal cannula    Pneumonia, likely atypical  Lactic acidosis w/suspected severe sepsis    - initially treated with Aith/ceftriaxone, changed to zosyn on 2/11    - has history of MRSA    - add vanco     - MRSA swab of nares (not vanco if negative)    Possible COPD exacerbation, unlikely    - started IV solumedrol on admission, changed to oral prednisone on 2/14    - on scheduled duonebs- changed to PRN    - never had significant wheezing so less likely COPD as cause of hypoxia    - stopped steroids    Possible acute diastolic CHF, unlikely    - patient has been given IVF and then was diuresed    - based on  elevated BNP/possible CHF on CXR (more likely infectious on CT), was diuresed with scheduled lasix 20mg IV BID    - ECHO w/EF 60-65%, grade 1 diastolic dysfunction and possibly decreased R ventricular function    - In/Out indicate patient is -9L (I doubt this is accurate as intake is recorded at 640 total)    - now with MILADYS and elevated NA    - stopped lasix    - was started on midodrine to allow for diuresis, discontinued    - will give .45NS at 100cc/hr x 1 liter    Lung mass/Mediastinal lymphadenopathy    - extensive smoking hx with recent significant weight loss now w/CT chest showing 5.3cm mass like opacity LLL which may represent pneumonia    - also with evidence of mediastinal lymphadenopathy, scattered sclerotic osseous lesions: CLL, metastatic prostate CA?    - spoke with Pulmonary    - to have bronch today    Suspected prostate cancer with local mets to bone    - CT abd/pelvis with abnormal prostate findings as well as local slerotic bone lesions concerning for metastatic prostate cancer    - oncology consulted, IR initially consulted on Sunday for biopsy but cancelled by oncology due to respiratory issues    - Spoke with Oncology: no plans for chemo given respiratory status, may start casodex or Lupron      Suspected COPD Exacerbation  Possible ILD    - extensive smoking hx but no pfts on file, has since quit smoking    - CT with findings c/w underlying ILD    - does have hx in  and extensive outdoors exposure historically but not in past few years    - plan as above     CLL  thrombocytopenia    - not on tx for is CLL outpatient but does follow with oncology    - platelets low: holding ASA and not on DVT prophylaxis    - stop lovenox for now    - peripheral smear ordered by oncology: done and in chart     Hx Essential HTN    - home hydrochlorothiazide held, has been hypotensive as above    Double vision    - since this am    - possibly due to hypoxia    - would proceed with bronch and then  re-assess    - resolved    Moderate Protein-Calorie Malnutrition    Sons in room and updated     Diet: Snacks/Supplements Adult: Ensure Enlive; With Meals  NPO per Anesthesia Guidelines for Procedure/Surgery Except for: Meds    DVT Prophylaxis: scds  Alfonso Catheter: Not present  Lines: None     Cardiac Monitoring: ACTIVE order. Indication: Tachyarrhythmias, acute (48 hours)  Code Status: Full Code      Disposition Plan   Await significant improvement, likely another 2-3 days at least     Marshal Joshi MD  Hospitalist Service  [facility]  Securely message with [employer] (more info)  Text page via [employer] Paging/Directory   ______________________________________________________________________    Interval History    Patient in chair. Had some ear popping this am. Gone. No chest pain, abdo pain, n/v/d.    Physical Exam   Vital Signs: Temp: 98.5  F (36.9  C) Temp src: Oral BP: 117/55 Pulse: 111   Resp: 20 SpO2: 90 % O2 Device: High Flow Nasal Cannula (HFNC) Oxygen Delivery: 35 LPM  Weight: 183 lbs 4.8 oz  Constitutional: awake, alert and cooperative, appears underweight  Eyes: pupils equal, round and reactive to light and conjunctiva normal  ENT: normocepalic, without obvious abnormality, atramatic  Respiratory: coarse crackles bilaterally L>R. .Good air movement. No wheezing. No fine crackles  Cardiovascular: regular rate and rhythm, no murmur noted and trace LE edema  GI: normal bowel sounds, soft and non-distended  Skin: chronic skin changes bilateral LE, no rash  Neurologic: alert, oriented x4, no focal deficits    45 MINUTES SPENT BY ME on the date of service doing chart review, history, exam, documentation & further activities per the note.      Data   ------------------------- PAST 24 HR DATA REVIEWED -----------------------------------------------    I have personally reviewed the following data over the past 24 hrs:    72.0 (HH)  \   11.0 (L)   / 72 (L)     143 107 24.9 (H) /  121 (H)   3.9  22 1.39 (H) \       Procal: N/A CRP: N/A Lactic Acid: 1.6         Imaging results reviewed over the past 24 hrs:   No results found for this or any previous visit (from the past 24 hour(s)).

## 2023-02-15 NOTE — PROGRESS NOTES
A HFNC of 25 LPM 50% remains on pt throughout the shift with no issues noted. RT will continue to follow

## 2023-02-15 NOTE — PROGRESS NOTES
02/15/23 1743   Child Life   Location Other (comments)  (ICU)   Intervention Referral/Consult;End of Life Care   Outcomes/Follow Up Provided Materials     Provided ink (green) handprints from right hand for patient's family. Provided finger print charm to be mailed to family using patient's left index finger.

## 2023-02-15 NOTE — PROCEDURES
Procedure:  Bronchoscopy  Indication: Pulmonary infiltrates, worsening hypoxia  Providers:  Dimitry Pulido M.D.  Medications:   Per anesthesia    Time Out:  Performed    The patient's medical record has been reviewed.  The indication for the procedure was reviewed and is adequate.  The necessary history and physical examination was performed.  The risks, benefits and alternatives of the procedure were discussed with the the patient and his son Jaswant in detail.  Specifically I discussed that due to his tenuous respiratory status, he was at increased risk of cardiopulmonary complications such as respiratory arrest or cardiac arrest and that he will likely need to remain intubated postprocedure.  They had the opportunity to ask questions.  All questions were answered and verbal and written informed consent was obtained.  The proposed procedure and the patient's identification were verified prior to the procedure by the physician and the nurse and technician.        Maneuvers / Procedure:     The bronchoscope was inserted through the mouth via ETT.  A complete airway examination was performed from the distal trachea to the subsegmental level in each lobe of both lungs. There were no endobronchial lesion.  There were no abnormalities in the right lung.  There were copious tenacious clear blood-tinged secretions throughout the left middle and lower lobes.  The secretions were completely suctioned during the procedure    A BAL was performed in the LLL Anterior.  A total of 50 ml of fluid was instilled.   A total of 20 ml of fluid was returned.   The samples were combined and sent for full infectious evaluation including cytology and cell counts.  BAL fluid was combined with suction the secretions.    Complications: No complications during the procedure, though the patient required high FiO2 and was hypotensive requiring boluses of vasopressin.  Following the procedure he remained intubated and was transferred directly  to the ICU.    Estimated Blood Loss: 0 ml    The patient tolerated the procedure fairly well, though did experience hypotension requiring boluses of vasopressin.    The procedure was performed in OR    The case is discussed with the patients son after the procedure.       Dimitry Pulido M.D.  Pulmonary & Critical Care  Pager: Click Here to page

## 2023-02-15 NOTE — PROGRESS NOTES
SPIRITUAL HEALTH SERVICES Progress Note  [facility] ICU    Referral Source: Code     Provided end of life emotional support and prayer for family of Jaswant Sawyer. Pt is Mos[employer].  Pt son-Jaswant, corrine- Jeimy were at the bedside at time of death.   Granddaughter-Kelly and her spouse- Martínez waited in family gathering room.   Family did not wish to return to pt room.   Family expressed spiritual needs met and are awaiting Child/Family Life for fingerprint remembrance.      Jose Brenner MA  Staff   *[phone]   On Site Tu/We/Th    [facility] remains available 24/7 for emergent requests/referrals, either by having the on-call  paged or by entering an ASAP/STAT consult in Epic (this will also page the on-call ). Routine Epic consults receive an initial response within 24 hours.

## 2023-02-15 NOTE — ANESTHESIA PREPROCEDURE EVALUATION
Anesthesia Pre-Procedure Evaluation    Patient: Jaswant Sawyer   MRN: [id number] : 1940        Procedure : Procedure(s):  BRONCHOSCOPY          Past Medical History:   Diagnosis Date     Essential hypertension     untreated     Tobacco abuse       Past Surgical History:   Procedure Laterality Date     APPENDECTOMY       BLADDER SURGERY      bladder stone removal      TURP        No Known Allergies   Social History     Tobacco Use     Smoking status: Former     Packs/day: 1.00     Years: 77.00     Pack years: 77.00     Types: Cigarettes     Quit date:      Years since quittin.1     Smokeless tobacco: Not on file   Substance Use Topics     Alcohol use: Not Currently      Wt Readings from Last 1 Encounters:   02/10/23 83.1 kg (183 lb 4.8 oz)        Anesthesia Evaluation   Pt has had prior anesthetic.         ROS/MED HX  ENT/Pulmonary:     (+) tobacco use, Past use, COPD, O2 dependent, 35 liters/min, recent URI,     Neurologic:       Cardiovascular:     (+) hypertension-----    METS/Exercise Tolerance:     Hematologic:       Musculoskeletal:       GI/Hepatic:    (-) GERD   Renal/Genitourinary:       Endo:       Psychiatric/Substance Use:       Infectious Disease:       Malignancy:   (+) Malignancy, History of Lymphoma/Leukemia, Lung and Prostate.    Other:            Physical Exam    Airway        Mallampati: II   TM distance: > 3 FB   Neck ROM: full     Respiratory Devices and Support         Dental           Cardiovascular   cardiovascular exam normal          Pulmonary           (+) decreased breath sounds           OUTSIDE LABS:  CBC:   Lab Results   Component Value Date    WBC 72.0 (HH) 02/15/2023    WBC 73.4 (HH) 2023    HGB 11.0 (L) 02/15/2023    HGB 11.8 (L) 2023    HCT 35.2 (L) 02/15/2023    HCT 37.9 (L) 2023    PLT 72 (L) 02/15/2023    PLT 77 (L) 2023     BMP:   Lab Results   Component Value Date     02/15/2023     (H) 2023    POTASSIUM 3.9 02/15/2023     POTASSIUM 4.1 02/14/2023    CHLORIDE 107 02/15/2023    CHLORIDE 109 (H) 02/14/2023    CO2 22 02/15/2023    CO2 24 02/14/2023    BUN 24.9 (H) 02/15/2023    BUN 28.8 (H) 02/14/2023    CR 1.39 (H) 02/15/2023    CR 1.53 (H) 02/14/2023     (H) 02/15/2023    GLC 95 02/14/2023     COAGS:   Lab Results   Component Value Date    PTT 26 02/11/2023    INR 1.19 (H) 02/11/2023     POC: No results found for: BGM, HCG, HCGS  HEPATIC:   Lab Results   Component Value Date    ALBUMIN 3.6 07/14/2019    PROTTOTAL 6.8 07/14/2019    ALT 19 07/14/2019    AST 16 07/14/2019    ALKPHOS 106 07/14/2019    BILITOTAL 0.9 07/14/2019     OTHER:   Lab Results   Component Value Date    LACT 1.6 02/15/2023    REMI 8.4 (L) 02/15/2023    MAG 2.2 02/15/2023    CRP <2.9 07/12/2019    SED 5 07/12/2019       Anesthesia Plan    ASA Status:  4   NPO Status:  NPO Appropriate    Anesthesia Type: General.   Induction: Intravenous.   Maintenance: Balanced.        Consents    Anesthesia Plan(s) and associated risks, benefits, and realistic alternatives discussed. Questions answered and patient/representative(s) expressed understanding.    - Discussed:     - Discussed with:  Patient         Postoperative Care    Pain management: IV analgesics, Oral pain medications, Multi-modal analgesia.   PONV prophylaxis: Ondansetron (or other 5HT-3), Dexamethasone or Solumedrol     Comments:                Ramiro Bradley MD

## 2023-02-15 NOTE — PLAN OF CARE
"CARE FROM 1900 - 07:    Pt A&Ox4. Denies pain. VSS ex HR and O2 requirements. O2 sats 88-91% on HFNC 30L 50% FiO2. Tele tech reports SR 90s; 7 beat run of v tach x1 overnight. HR up to 130s with activity. IV zosyn. Intermittent cough, at times productive. Pt set up in recliner throughout shift to make more like how he sleeps at home, and make standing to use urinal less complicated and taxing. Pt made comment to this RN that he \"felt like he just  for a couple seconds\", PA notified, EKG and tele ordered. Pt o/w denies and s/s prior to event or any s/s of chest pain or palpitations after.  Pulmonology and Heme/Onc following. NPO since 3AM ex ice chips. Plan for bronch at 1340.     "

## 2023-02-15 NOTE — ANESTHESIA PROCEDURE NOTES
Airway       Patient location during procedure: OR       Procedure Start/Stop Times: 2/15/2023 1:43 PM  Staff -        CRNA: Andre Geronimo APRN CRNA       Performed By: CRNA  Consent for Airway        Urgency: elective  Indications and Patient Condition       Indications for airway management: hang-procedural       Induction type:intravenous       Mask difficulty assessment: 1 - vent by mask    Final Airway Details       Final airway type: endotracheal airway       Successful airway: ETT - single and Oral  Endotracheal Airway Details        Tube size (mm): 9.0.       Cuffed: yes       Successful intubation technique: direct laryngoscopy       DL Blade Type: Bailey 2       Grade View of Cords: 1       Adjucts: stylet       Position: Center       Measured from: gums/teeth       Secured at (cm): 23       Bite block used: None    Post intubation assessment        Placement verified by: capnometry, equal breath sounds and chest rise        Number of attempts at approach: 1       Number of other approaches attempted: 0       Secured with: plastic tape       Ease of procedure: easy       Dentition: Intact    Medication(s) Administered   Medication Administration Time: 2/15/2023 1:43 PM       No

## 2023-02-15 NOTE — ANESTHESIA POSTPROCEDURE EVALUATION
Patient: Jaswant Sawyer    Procedure: Procedure(s):  BRONCHOSCOPY LAVAGE       Anesthesia Type:  General    Note:  Disposition: Inpatient   Postop Pain Control: Uneventful            Sign Out: Well controlled pain   PONV: No   Neuro/Psych: Uneventful            Sign Out: Acceptable/Baseline neuro status   Airway/Respiratory: Uneventful            Sign Out: AIRWAY IN SITU/Resp. Support   CV/Hemodynamics: Uneventful            Sign Out: Acceptable CV status; No obvious hypovolemia; No obvious fluid overload   Other NRE: NONE   DID A NON-ROUTINE EVENT OCCUR? No           Last vitals:  Vitals:    02/15/23 1135 02/15/23 1202 02/15/23 1236   BP:   111/86   Pulse: 97  104   Resp: 18  20   Temp:   98.2  F (36.8  C)   SpO2: 93% 92% 96%       Electronically Signed By: Ramiro Bradley MD  February 15, 2023  2:52 PM

## 2023-02-15 NOTE — ANESTHESIA CARE TRANSFER NOTE
Patient: Jaswant Sawyer    Procedure: Procedure(s):  BRONCHOSCOPY LAVAGE       Diagnosis: Pneumonia due to infectious organism, unspecified laterality, unspecified part of lung [J18.9]  Diagnosis Additional Information: No value filed.    Anesthesia Type:   General     Note:    Oropharynx: ventilatory support and endotracheal tube in place  Level of Consciousness: drowsy      Independent Airway: airway patency not satisfactory and stable  Dentition: dentition unchanged  Vital Signs Stable: post-procedure vital signs reviewed and stable  Report to RN Given: handoff report given  Patient transferred to: ICU    ICU Handoff: Call for PAUSE to initiate/utilize ICU HANDOFF, Identified Patient, Identified Responsible Provider, Reviewed the Pertinent Medical History, Discussed Surgical Course, Reviewed Intra-OP Anesthesia Management and Issues during Anesthesia, Set Expectations for Post Procedure Period and Allowed Opportunity for Questions and Acknowledgement of Understanding      Vitals:  Vitals Value Taken Time   BP     Temp     Pulse     Resp     SpO2         Electronically Signed By: HOLLY Carr CRNA  February 15, 2023  2:41 PM

## 2023-02-15 NOTE — PROGRESS NOTES
Notified of elevated lactic acid at 2.7. his LA has been bouncing around and don't think this reflects sepsis. On high flow oxygen for pneumonia. On broad spectrum Abx. No changes. Will recheck in 2 hours.

## 2023-02-16 LAB
ATRIAL RATE - MUSE: 103 BPM
BACTERIA BLD CULT: NO GROWTH
BACTERIA BLD CULT: NO GROWTH
DIASTOLIC BLOOD PRESSURE - MUSE: NORMAL MMHG
INTERPRETATION ECG - MUSE: NORMAL
P AXIS - MUSE: 47 DEGREES
PR INTERVAL - MUSE: 120 MS
QRS DURATION - MUSE: 92 MS
QT - MUSE: 342 MS
QTC - MUSE: 448 MS
R AXIS - MUSE: -24 DEGREES
SYSTOLIC BLOOD PRESSURE - MUSE: NORMAL MMHG
T AXIS - MUSE: 263 DEGREES
VENTRICULAR RATE- MUSE: 103 BPM

## 2023-02-16 NOTE — PROGRESS NOTES
Right wrist and Left wrist restraints initiated on patient on 2/15/2023 at 7:07 PM           ,     Order received: Yes         Criteria explained to Patient     Restraint care Plan initiated: Yes    DIONICIO ORELLANA, RN

## 2023-02-16 NOTE — PLAN OF CARE
Goal Outcome Evaluation:      Plan of Care Reviewed With: patient, family    ICU End of Shift Summary.  For vital signs and complete assessments, please see documentation flowsheets.      Pertinent assessments:   -1440 Patient arrived to unit from OR. Patient restless and requiring more sedation. V.O from intensivist to give 2mg of versed. Meds given.    -Staff transferred patient to ICU bed. Patient transitioned to vent, tele shows SR with ST depression. BPs soft. Patient agitated requiring more sedation. MD notified and precedex and levophed ordered.     -Patient stable. ICU staff noted patients BP soft and was going to increase dose of pressors when patient began to become bradycardic with HR dropping. Pulse check completed and could not feel pulse.     -1510 Code blue button pressed and CPR started.  -Team arrived with priya and crash cart.   -Family brought to bedside. Coded patient for approx 44 min with no improvement before pronounced dead.  -See MD notes regarding CODE.       Plan (Upcoming Events): [employer] of site following up with family.  Discharge/Transfer Needs:  -Patient pronounced dead at 1554. Family at bedside.

## 2023-02-16 NOTE — PROGRESS NOTES
Right wrist and Left wrist restraints discontinued at 3:10 PM on 2/15/2023.    Restraint discontinue criteria met, patient is calm, cooperative and safe. Restraints removed.     Patient's Response: No evidence of learning  Family Notification: Other  Attending Physician Notified: MD ordered restraint, Attending Physician's Name: DIONICIO Edwards RN

## 2023-02-16 NOTE — PLAN OF CARE
Goal Outcome Evaluation:      Plan of Care Reviewed With: patient, family    Patient removed from restraints due to CODE blue being called.

## 2023-02-17 LAB
BACTERIA BRONCH: ABNORMAL
GALACTOMANNAN AG BAL QL: NEGATIVE
GALACTOMANNAN AG SPEC IA-ACNC: 0.15
LYMPHOCYTES NFR FLD MANUAL: 7 %
MONOS+MACROS NFR FLD MANUAL: NORMAL %
NEUTS BAND NFR FLD MANUAL: 78 %
OTHER CELLS FLD MANUAL: 15 %

## 2023-02-19 LAB — M PNEUMO DNA SPEC QL NAA+PROBE: NOT DETECTED

## 2023-03-01 LAB
BACTERIA BRONCH: NORMAL
BACTERIA BRONCH: NORMAL

## 2023-03-15 LAB
BACTERIA BRONCH: ABNORMAL
BACTERIA BRONCH: NO GROWTH

## 2023-04-12 LAB
ACID FAST STAIN (ARUP): NORMAL
ACID FAST STAIN (ARUP): NORMAL

## (undated) DEVICE — SYR 50ML CATH TIP W/O NDL 309620

## (undated) DEVICE — SPECIMEN CONTAINER 3OZ

## (undated) DEVICE — GLOVE BIOGEL 6.5 LATEX

## (undated) DEVICE — ATOMIZER MUCOSAL MEMBRANE MAD100

## (undated) DEVICE — TUBING SUCTION 12"X1/4" N612

## (undated) DEVICE — SYR 50ML SLIP TIP W/O NDL 309654

## (undated) DEVICE — SPECIMEN TRAP MUCOUS SIMILAC NTRL CARE GRAD 80ML 0045860

## (undated) DEVICE — SYR 10ML SLIP TIP W/O NDL

## (undated) DEVICE — SOL NACL 0.9% IRRIG 1000ML BOTTLE 2F7124

## (undated) RX ORDER — LIDOCAINE HYDROCHLORIDE 10 MG/ML
INJECTION, SOLUTION EPIDURAL; INFILTRATION; INTRACAUDAL; PERINEURAL
Status: DISPENSED
Start: 2023-01-01

## (undated) RX ORDER — VASOPRESSIN 20 U/ML
INJECTION PARENTERAL
Status: DISPENSED
Start: 2023-01-01

## (undated) RX ORDER — LIDOCAINE HYDROCHLORIDE 20 MG/ML
SOLUTION OROPHARYNGEAL
Status: DISPENSED
Start: 2023-01-01

## (undated) RX ORDER — FENTANYL CITRATE-0.9 % NACL/PF 10 MCG/ML
PLASTIC BAG, INJECTION (ML) INTRAVENOUS
Status: DISPENSED
Start: 2023-01-01

## (undated) RX ORDER — EPHEDRINE SULFATE 50 MG/ML
INJECTION, SOLUTION INTRAMUSCULAR; INTRAVENOUS; SUBCUTANEOUS
Status: DISPENSED
Start: 2023-01-01

## (undated) RX ORDER — LIDOCAINE HYDROCHLORIDE 40 MG/ML
INJECTION, SOLUTION RETROBULBAR
Status: DISPENSED
Start: 2023-01-01

## (undated) RX ORDER — LIDOCAINE HYDROCHLORIDE AND EPINEPHRINE 10; 10 MG/ML; UG/ML
INJECTION, SOLUTION INFILTRATION; PERINEURAL
Status: DISPENSED
Start: 2023-01-01

## (undated) RX ORDER — FENTANYL CITRATE 50 UG/ML
INJECTION, SOLUTION INTRAMUSCULAR; INTRAVENOUS
Status: DISPENSED
Start: 2023-01-01